# Patient Record
Sex: FEMALE | Race: WHITE | Employment: OTHER | URBAN - METROPOLITAN AREA
[De-identification: names, ages, dates, MRNs, and addresses within clinical notes are randomized per-mention and may not be internally consistent; named-entity substitution may affect disease eponyms.]

---

## 2017-05-24 ENCOUNTER — HOSPITAL ENCOUNTER (OUTPATIENT)
Dept: MAMMOGRAPHY | Age: 72
Discharge: HOME | End: 2017-05-24
Attending: OBSTETRICS & GYNECOLOGY
Payer: MEDICARE

## 2017-05-24 DIAGNOSIS — Z12.39 SCREENING FOR BREAST CANCER: ICD-10-CM

## 2017-05-24 PROCEDURE — 77067 SCR MAMMO BI INCL CAD: CPT

## 2018-05-29 ENCOUNTER — HOSPITAL ENCOUNTER (OUTPATIENT)
Dept: MAMMOGRAPHY | Age: 73
Discharge: HOME | End: 2018-05-29
Attending: OBSTETRICS & GYNECOLOGY
Payer: MEDICARE

## 2018-05-29 DIAGNOSIS — Z12.31 VISIT FOR SCREENING MAMMOGRAM: ICD-10-CM

## 2018-05-29 PROCEDURE — 77063 BREAST TOMOSYNTHESIS BI: CPT

## 2018-10-08 ENCOUNTER — HOSPITAL ENCOUNTER (OUTPATIENT)
Dept: GENERAL RADIOLOGY | Age: 73
Discharge: HOME | End: 2018-10-08
Payer: MEDICARE

## 2018-10-08 DIAGNOSIS — R04.2 HEMOPTYSIS: ICD-10-CM

## 2018-10-08 PROCEDURE — 71046 X-RAY EXAM CHEST 2 VIEWS: CPT

## 2019-05-07 ENCOUNTER — HOSPITAL ENCOUNTER (OUTPATIENT)
Dept: LAB | Age: 74
Discharge: HOME | End: 2019-05-07
Payer: MEDICARE

## 2019-05-07 DIAGNOSIS — J47.1 BRONCHIECTASIS WITH (ACUTE) EXACERBATION (HCC): ICD-10-CM

## 2019-05-07 PROCEDURE — 87116 MYCOBACTERIA CULTURE: CPT

## 2019-05-07 PROCEDURE — 87077 CULTURE AEROBIC IDENTIFY: CPT

## 2019-05-07 PROCEDURE — 87070 CULTURE OTHR SPECIMN AEROBIC: CPT

## 2019-05-14 LAB
ANTIMICROBIAL SUSCEPTIBILITY, 080575: ABNORMAL
BACTERIA ISLT: NORMAL
BACTERIA SPEC CULT: ABNORMAL
GRAM STN SPEC: ABNORMAL
RESULT 1, 080571: ABNORMAL
SERVICE CMNT-IMP: ABNORMAL
SPECIMEN SOURCE: NORMAL

## 2019-05-14 NOTE — PROGRESS NOTES
Please let patient know that sputum culture is negative for mycobacterium so far. Final cultures will take 6-8 weeks. However, bacterial cultures were positive for pseudomonas--needs to be on Cipro 500 mg bid x 3 weeks. Thank you.

## 2019-05-14 NOTE — PROGRESS NOTES
Pt was notified that sputum culture is negative for mycobacterium so far. She was told bacterial cultures were positive for pseudomonas. She was instructed to take cipro 500 mg bid x3 weeks. Pt verbalized understanding. Rx was sent to [facility] in [place]. She was also told final sputum culture will take 6-8 weeks.

## 2019-06-18 ENCOUNTER — HOSPITAL ENCOUNTER (OUTPATIENT)
Dept: MAMMOGRAPHY | Age: 74
Discharge: HOME | End: 2019-06-18
Attending: OBSTETRICS & GYNECOLOGY
Payer: MEDICARE

## 2019-06-18 DIAGNOSIS — Z12.31 VISIT FOR SCREENING MAMMOGRAM: ICD-10-CM

## 2019-06-18 PROCEDURE — 77063 BREAST TOMOSYNTHESIS BI: CPT

## 2019-06-24 LAB
ACID FAST STN SPEC: NEGATIVE
MYCOBACTERIUM SPEC QL CULT: NEGATIVE
SPECIMEN PREPARATION: NORMAL
SPECIMEN SOURCE: NORMAL

## 2020-06-22 ENCOUNTER — HOSPITAL ENCOUNTER (OUTPATIENT)
Dept: MAMMOGRAPHY | Age: 75
Discharge: HOME | End: 2020-06-22
Attending: INTERNAL MEDICINE
Payer: MEDICARE

## 2020-06-22 DIAGNOSIS — Z12.31 SCREENING MAMMOGRAM, ENCOUNTER FOR: ICD-10-CM

## 2020-06-22 PROCEDURE — 77063 BREAST TOMOSYNTHESIS BI: CPT

## 2021-03-29 ENCOUNTER — APPOINTMENT (OUTPATIENT)
Dept: GENERAL RADIOLOGY | Age: 76
DRG: 191 | End: 2021-03-29
Attending: EMERGENCY MEDICINE
Payer: MEDICARE

## 2021-03-29 ENCOUNTER — APPOINTMENT (OUTPATIENT)
Dept: CT IMAGING | Age: 76
DRG: 191 | End: 2021-03-29
Attending: EMERGENCY MEDICINE
Payer: MEDICARE

## 2021-03-29 ENCOUNTER — HOSPITAL ENCOUNTER (INPATIENT)
Age: 76
LOS: 3 days | Discharge: HOME | DRG: 191 | End: 2021-04-01
Attending: EMERGENCY MEDICINE | Admitting: HOSPITALIST
Payer: MEDICARE

## 2021-03-29 DIAGNOSIS — R06.00 DYSPNEA, UNSPECIFIED TYPE: ICD-10-CM

## 2021-03-29 DIAGNOSIS — J90 BILATERAL PLEURAL EFFUSION: ICD-10-CM

## 2021-03-29 DIAGNOSIS — R05.9 COUGH: ICD-10-CM

## 2021-03-29 DIAGNOSIS — J47.0 BRONCHIECTASIS WITH ACUTE LOWER RESPIRATORY INFECTION (HCC): ICD-10-CM

## 2021-03-29 DIAGNOSIS — J18.9 COMMUNITY ACQUIRED PNEUMONIA OF LEFT LOWER LOBE OF LUNG: ICD-10-CM

## 2021-03-29 DIAGNOSIS — J91.8 PARAPNEUMONIC EFFUSION: ICD-10-CM

## 2021-03-29 DIAGNOSIS — J90 PLEURAL EFFUSION, BILATERAL: ICD-10-CM

## 2021-03-29 DIAGNOSIS — J18.9 PARAPNEUMONIC EFFUSION: ICD-10-CM

## 2021-03-29 DIAGNOSIS — J47.9 BRONCHIECTASIS WITHOUT COMPLICATION (HCC): Chronic | ICD-10-CM

## 2021-03-29 DIAGNOSIS — D75.839 THROMBOCYTOSIS: ICD-10-CM

## 2021-03-29 DIAGNOSIS — I30.9 ACUTE PERICARDIAL EFFUSION: ICD-10-CM

## 2021-03-29 DIAGNOSIS — J18.9 PNEUMONIA OF RIGHT LOWER LOBE DUE TO INFECTIOUS ORGANISM: Primary | ICD-10-CM

## 2021-03-29 PROBLEM — I48.91 ATRIAL FIBRILLATION (HCC): Status: ACTIVE | Noted: 2021-03-29

## 2021-03-29 LAB
ALBUMIN SERPL-MCNC: 2.6 G/DL (ref 3.2–4.6)
ALBUMIN/GLOB SERPL: 0.5 {RATIO} (ref 1.2–3.5)
ALP SERPL-CCNC: 173 U/L (ref 50–136)
ALT SERPL-CCNC: 22 U/L (ref 12–65)
ANION GAP SERPL CALC-SCNC: 3 MMOL/L (ref 7–16)
AST SERPL-CCNC: 15 U/L (ref 15–37)
ATRIAL RATE: 82 BPM
BASOPHILS # BLD: 0.1 K/UL (ref 0–0.2)
BASOPHILS NFR BLD: 1 % (ref 0–2)
BILIRUB SERPL-MCNC: 0.5 MG/DL (ref 0.2–1.1)
BUN SERPL-MCNC: 10 MG/DL (ref 8–23)
CALCIUM SERPL-MCNC: 8.9 MG/DL (ref 8.3–10.4)
CALCULATED P AXIS, ECG09: 70 DEGREES
CALCULATED R AXIS, ECG10: 63 DEGREES
CALCULATED T AXIS, ECG11: 16 DEGREES
CHLORIDE SERPL-SCNC: 102 MMOL/L (ref 98–107)
CO2 SERPL-SCNC: 30 MMOL/L (ref 21–32)
CREAT SERPL-MCNC: 0.79 MG/DL (ref 0.6–1)
CRP SERPL HS-MCNC: 241 MG/L
D DIMER PPP FEU-MCNC: 8.88 UG/ML(FEU)
DIAGNOSIS, 93000: NORMAL
DIFFERENTIAL METHOD BLD: ABNORMAL
EOSINOPHIL # BLD: 0.1 K/UL (ref 0–0.8)
EOSINOPHIL NFR BLD: 1 % (ref 0.5–7.8)
ERYTHROCYTE [DISTWIDTH] IN BLOOD BY AUTOMATED COUNT: 13.9 % (ref 11.9–14.6)
ERYTHROCYTE [SEDIMENTATION RATE] IN BLOOD: 70 MM/HR (ref 0–30)
GLOBULIN SER CALC-MCNC: 5.2 G/DL (ref 2.3–3.5)
GLUCOSE SERPL-MCNC: 101 MG/DL (ref 65–100)
HCT VFR BLD AUTO: 38.4 % (ref 35.8–46.3)
HGB BLD-MCNC: 12.1 G/DL (ref 11.7–15.4)
IMM GRANULOCYTES # BLD AUTO: 0.2 K/UL (ref 0–0.5)
IMM GRANULOCYTES NFR BLD AUTO: 1 % (ref 0–5)
LACTATE SERPL-SCNC: 1.5 MMOL/L (ref 0.4–2)
LACTATE SERPL-SCNC: 1.9 MMOL/L (ref 0.4–2)
LYMPHOCYTES # BLD: 2.1 K/UL (ref 0.5–4.6)
LYMPHOCYTES NFR BLD: 11 % (ref 13–44)
MCH RBC QN AUTO: 29.3 PG (ref 26.1–32.9)
MCHC RBC AUTO-ENTMCNC: 31.5 G/DL (ref 31.4–35)
MCV RBC AUTO: 93 FL (ref 79.6–97.8)
MONOCYTES # BLD: 1.7 K/UL (ref 0.1–1.3)
MONOCYTES NFR BLD: 9 % (ref 4–12)
NEUTS SEG # BLD: 15.6 K/UL (ref 1.7–8.2)
NEUTS SEG NFR BLD: 79 % (ref 43–78)
NRBC # BLD: 0 K/UL (ref 0–0.2)
P-R INTERVAL, ECG05: 162 MS
PLATELET # BLD AUTO: 606 K/UL (ref 150–450)
PMV BLD AUTO: 9.5 FL (ref 9.4–12.3)
POTASSIUM SERPL-SCNC: 4.1 MMOL/L (ref 3.5–5.1)
PROCALCITONIN SERPL-MCNC: 0.08 NG/ML
PROT SERPL-MCNC: 7.8 G/DL (ref 6.3–8.2)
Q-T INTERVAL, ECG07: 346 MS
QRS DURATION, ECG06: 68 MS
QTC CALCULATION (BEZET), ECG08: 404 MS
RBC # BLD AUTO: 4.13 M/UL (ref 4.05–5.2)
SODIUM SERPL-SCNC: 135 MMOL/L (ref 136–145)
TROPONIN-HIGH SENSITIVITY: 6 PG/ML (ref 0–14)
VENTRICULAR RATE, ECG03: 82 BPM
WBC # BLD AUTO: 19.8 K/UL (ref 4.3–11.1)

## 2021-03-29 PROCEDURE — 74011250637 HC RX REV CODE- 250/637: Performed by: HOSPITALIST

## 2021-03-29 PROCEDURE — 77030027138 HC INCENT SPIROMETER -A

## 2021-03-29 PROCEDURE — 84145 PROCALCITONIN (PCT): CPT

## 2021-03-29 PROCEDURE — 93005 ELECTROCARDIOGRAM TRACING: CPT | Performed by: EMERGENCY MEDICINE

## 2021-03-29 PROCEDURE — 93306 TTE W/DOPPLER COMPLETE: CPT

## 2021-03-29 PROCEDURE — 94760 N-INVAS EAR/PLS OXIMETRY 1: CPT

## 2021-03-29 PROCEDURE — 85379 FIBRIN DEGRADATION QUANT: CPT

## 2021-03-29 PROCEDURE — 87040 BLOOD CULTURE FOR BACTERIA: CPT

## 2021-03-29 PROCEDURE — 86141 C-REACTIVE PROTEIN HS: CPT

## 2021-03-29 PROCEDURE — 99223 1ST HOSP IP/OBS HIGH 75: CPT | Performed by: INTERNAL MEDICINE

## 2021-03-29 PROCEDURE — 77030040361 HC SLV COMPR DVT MDII -B

## 2021-03-29 PROCEDURE — 96365 THER/PROPH/DIAG IV INF INIT: CPT

## 2021-03-29 PROCEDURE — 80053 COMPREHEN METABOLIC PANEL: CPT

## 2021-03-29 PROCEDURE — 74011000250 HC RX REV CODE- 250: Performed by: HOSPITALIST

## 2021-03-29 PROCEDURE — 96375 TX/PRO/DX INJ NEW DRUG ADDON: CPT

## 2021-03-29 PROCEDURE — 71260 CT THORAX DX C+: CPT

## 2021-03-29 PROCEDURE — 74011636637 HC RX REV CODE- 636/637: Performed by: HOSPITALIST

## 2021-03-29 PROCEDURE — 74011000636 HC RX REV CODE- 636: Performed by: EMERGENCY MEDICINE

## 2021-03-29 PROCEDURE — 99285 EMERGENCY DEPT VISIT HI MDM: CPT

## 2021-03-29 PROCEDURE — 65270000029 HC RM PRIVATE

## 2021-03-29 PROCEDURE — 94640 AIRWAY INHALATION TREATMENT: CPT

## 2021-03-29 PROCEDURE — 85652 RBC SED RATE AUTOMATED: CPT

## 2021-03-29 PROCEDURE — 74011000250 HC RX REV CODE- 250: Performed by: EMERGENCY MEDICINE

## 2021-03-29 PROCEDURE — 71046 X-RAY EXAM CHEST 2 VIEWS: CPT

## 2021-03-29 PROCEDURE — 74011250636 HC RX REV CODE- 250/636: Performed by: EMERGENCY MEDICINE

## 2021-03-29 PROCEDURE — 74011000258 HC RX REV CODE- 258: Performed by: EMERGENCY MEDICINE

## 2021-03-29 PROCEDURE — 74011250636 HC RX REV CODE- 250/636: Performed by: INTERNAL MEDICINE

## 2021-03-29 PROCEDURE — 87205 SMEAR GRAM STAIN: CPT

## 2021-03-29 PROCEDURE — 2709999900 HC NON-CHARGEABLE SUPPLY

## 2021-03-29 PROCEDURE — 74011250636 HC RX REV CODE- 250/636: Performed by: HOSPITALIST

## 2021-03-29 PROCEDURE — 85025 COMPLETE CBC W/AUTO DIFF WBC: CPT

## 2021-03-29 PROCEDURE — 84484 ASSAY OF TROPONIN QUANT: CPT

## 2021-03-29 PROCEDURE — 83605 ASSAY OF LACTIC ACID: CPT

## 2021-03-29 RX ORDER — FUROSEMIDE 10 MG/ML
20 INJECTION INTRAMUSCULAR; INTRAVENOUS 2 TIMES DAILY
Status: COMPLETED | OUTPATIENT
Start: 2021-03-29 | End: 2021-03-30

## 2021-03-29 RX ORDER — VANCOMYCIN/0.9 % SOD CHLORIDE 1.5G/250ML
1500 PLASTIC BAG, INJECTION (ML) INTRAVENOUS ONCE
Status: DISCONTINUED | OUTPATIENT
Start: 2021-03-29 | End: 2021-03-29 | Stop reason: SDUPTHER

## 2021-03-29 RX ORDER — ACETAMINOPHEN 650 MG/1
650 SUPPOSITORY RECTAL
Status: DISCONTINUED | OUTPATIENT
Start: 2021-03-29 | End: 2021-04-01 | Stop reason: HOSPADM

## 2021-03-29 RX ORDER — METOPROLOL SUCCINATE 50 MG/1
50 TABLET, EXTENDED RELEASE ORAL DAILY
Status: DISCONTINUED | OUTPATIENT
Start: 2021-03-30 | End: 2021-04-01 | Stop reason: HOSPADM

## 2021-03-29 RX ORDER — POTASSIUM CHLORIDE 7.45 MG/ML
10 INJECTION INTRAVENOUS AS NEEDED
Status: DISCONTINUED | OUTPATIENT
Start: 2021-03-29 | End: 2021-04-01 | Stop reason: HOSPADM

## 2021-03-29 RX ORDER — SODIUM CHLORIDE 0.9 % (FLUSH) 0.9 %
5-40 SYRINGE (ML) INJECTION EVERY 8 HOURS
Status: DISCONTINUED | OUTPATIENT
Start: 2021-03-29 | End: 2021-04-01 | Stop reason: HOSPADM

## 2021-03-29 RX ORDER — ACETAMINOPHEN 325 MG/1
650 TABLET ORAL
Status: DISCONTINUED | OUTPATIENT
Start: 2021-03-29 | End: 2021-04-01 | Stop reason: HOSPADM

## 2021-03-29 RX ORDER — BUDESONIDE 0.5 MG/2ML
500 INHALANT ORAL
Status: DISCONTINUED | OUTPATIENT
Start: 2021-03-29 | End: 2021-04-01 | Stop reason: HOSPADM

## 2021-03-29 RX ORDER — LEVOTHYROXINE SODIUM 75 UG/1
75 TABLET ORAL DAILY
Status: DISCONTINUED | OUTPATIENT
Start: 2021-03-30 | End: 2021-04-01 | Stop reason: HOSPADM

## 2021-03-29 RX ORDER — PREDNISONE 20 MG/1
60 TABLET ORAL
Status: DISCONTINUED | OUTPATIENT
Start: 2021-03-29 | End: 2021-04-01 | Stop reason: HOSPADM

## 2021-03-29 RX ORDER — DEXAMETHASONE SODIUM PHOSPHATE 100 MG/10ML
8 INJECTION INTRAMUSCULAR; INTRAVENOUS
Status: COMPLETED | OUTPATIENT
Start: 2021-03-29 | End: 2021-03-29

## 2021-03-29 RX ORDER — ONDANSETRON 2 MG/ML
4 INJECTION INTRAMUSCULAR; INTRAVENOUS
Status: DISCONTINUED | OUTPATIENT
Start: 2021-03-29 | End: 2021-04-01 | Stop reason: HOSPADM

## 2021-03-29 RX ORDER — SODIUM CHLORIDE 0.9 % (FLUSH) 0.9 %
10 SYRINGE (ML) INJECTION
Status: COMPLETED | OUTPATIENT
Start: 2021-03-29 | End: 2021-03-29

## 2021-03-29 RX ORDER — LEVALBUTEROL INHALATION SOLUTION 1.25 MG/3ML
1.25 SOLUTION RESPIRATORY (INHALATION)
Status: DISCONTINUED | OUTPATIENT
Start: 2021-03-29 | End: 2021-04-01 | Stop reason: HOSPADM

## 2021-03-29 RX ORDER — SODIUM CHLORIDE 0.9 % (FLUSH) 0.9 %
5-40 SYRINGE (ML) INJECTION AS NEEDED
Status: DISCONTINUED | OUTPATIENT
Start: 2021-03-29 | End: 2021-04-01 | Stop reason: HOSPADM

## 2021-03-29 RX ORDER — PROMETHAZINE HYDROCHLORIDE 25 MG/1
12.5 TABLET ORAL
Status: DISCONTINUED | OUTPATIENT
Start: 2021-03-29 | End: 2021-04-01 | Stop reason: HOSPADM

## 2021-03-29 RX ORDER — ENOXAPARIN SODIUM 100 MG/ML
40 INJECTION SUBCUTANEOUS DAILY
Status: DISCONTINUED | OUTPATIENT
Start: 2021-03-30 | End: 2021-03-29

## 2021-03-29 RX ORDER — MAGNESIUM SULFATE HEPTAHYDRATE 40 MG/ML
2 INJECTION, SOLUTION INTRAVENOUS AS NEEDED
Status: DISCONTINUED | OUTPATIENT
Start: 2021-03-29 | End: 2021-04-01 | Stop reason: HOSPADM

## 2021-03-29 RX ORDER — IPRATROPIUM BROMIDE AND ALBUTEROL SULFATE 2.5; .5 MG/3ML; MG/3ML
3 SOLUTION RESPIRATORY (INHALATION)
Status: COMPLETED | OUTPATIENT
Start: 2021-03-29 | End: 2021-03-29

## 2021-03-29 RX ORDER — VANCOMYCIN/0.9 % SOD CHLORIDE 1.5G/250ML
1500 PLASTIC BAG, INJECTION (ML) INTRAVENOUS ONCE
Status: COMPLETED | OUTPATIENT
Start: 2021-03-29 | End: 2021-03-29

## 2021-03-29 RX ORDER — PANTOPRAZOLE SODIUM 40 MG/1
40 TABLET, DELAYED RELEASE ORAL
COMMUNITY

## 2021-03-29 RX ORDER — POLYETHYLENE GLYCOL 3350 17 G/17G
17 POWDER, FOR SOLUTION ORAL DAILY PRN
Status: DISCONTINUED | OUTPATIENT
Start: 2021-03-29 | End: 2021-04-01 | Stop reason: HOSPADM

## 2021-03-29 RX ORDER — GUAIFENESIN/DEXTROMETHORPHAN 100-10MG/5
5 SYRUP ORAL EVERY 8 HOURS
Status: DISCONTINUED | OUTPATIENT
Start: 2021-03-29 | End: 2021-03-31

## 2021-03-29 RX ORDER — VENLAFAXINE 75 MG/1
75 TABLET ORAL DAILY
Status: DISCONTINUED | OUTPATIENT
Start: 2021-03-30 | End: 2021-04-01 | Stop reason: HOSPADM

## 2021-03-29 RX ORDER — GUAIFENESIN 600 MG/1
1200 TABLET, EXTENDED RELEASE ORAL DAILY
Status: DISCONTINUED | OUTPATIENT
Start: 2021-03-29 | End: 2021-04-01 | Stop reason: HOSPADM

## 2021-03-29 RX ADMIN — DEXAMETHASONE SODIUM PHOSPHATE 8 MG: 10 INJECTION INTRAMUSCULAR; INTRAVENOUS at 09:58

## 2021-03-29 RX ADMIN — CEFEPIME HYDROCHLORIDE 2 G: 2 INJECTION, POWDER, FOR SOLUTION INTRAVENOUS at 13:46

## 2021-03-29 RX ADMIN — VANCOMYCIN HYDROCHLORIDE 1500 MG: 10 INJECTION, POWDER, LYOPHILIZED, FOR SOLUTION INTRAVENOUS at 16:32

## 2021-03-29 RX ADMIN — Medication 10 ML: at 23:03

## 2021-03-29 RX ADMIN — LEVALBUTEROL HYDROCHLORIDE 1.25 MG: 1.25 SOLUTION RESPIRATORY (INHALATION) at 15:40

## 2021-03-29 RX ADMIN — BUDESONIDE 500 MCG: 0.5 INHALANT RESPIRATORY (INHALATION) at 20:16

## 2021-03-29 RX ADMIN — FUROSEMIDE 20 MG: 10 INJECTION, SOLUTION INTRAMUSCULAR; INTRAVENOUS at 22:50

## 2021-03-29 RX ADMIN — LEVALBUTEROL HYDROCHLORIDE 1.25 MG: 1.25 SOLUTION RESPIRATORY (INHALATION) at 20:16

## 2021-03-29 RX ADMIN — IPRATROPIUM BROMIDE AND ALBUTEROL SULFATE 3 ML: .5; 3 SOLUTION RESPIRATORY (INHALATION) at 10:27

## 2021-03-29 RX ADMIN — SODIUM CHLORIDE 100 ML: 900 INJECTION, SOLUTION INTRAVENOUS at 11:27

## 2021-03-29 RX ADMIN — AZITHROMYCIN MONOHYDRATE 500 MG: 500 INJECTION, POWDER, LYOPHILIZED, FOR SOLUTION INTRAVENOUS at 12:10

## 2021-03-29 RX ADMIN — GUAIFENESIN AND DEXTROMETHORPHAN 5 ML: 100; 10 SYRUP ORAL at 22:51

## 2021-03-29 RX ADMIN — IOPAMIDOL 100 ML: 755 INJECTION, SOLUTION INTRAVENOUS at 11:27

## 2021-03-29 RX ADMIN — GUAIFENESIN 1200 MG: 600 TABLET ORAL at 16:05

## 2021-03-29 RX ADMIN — GUAIFENESIN AND DEXTROMETHORPHAN 5 ML: 100; 10 SYRUP ORAL at 16:06

## 2021-03-29 RX ADMIN — CEFTRIAXONE 2 G: 2 INJECTION, POWDER, FOR SOLUTION INTRAMUSCULAR; INTRAVENOUS at 12:09

## 2021-03-29 RX ADMIN — Medication 10 ML: at 11:27

## 2021-03-29 RX ADMIN — PREDNISONE 60 MG: 20 TABLET ORAL at 16:06

## 2021-03-29 NOTE — PROGRESS NOTES
Pharmacokinetic Consult to Pharmacist    Marcia Emilia is a 70 y.o. female being treated for parapneumonic effusion with vancomycin. Height: 5' 3\" (160 cm)  Weight: 59.9 kg (132 lb)  Lab Results   Component Value Date/Time    BUN 10 03/29/2021 08:19 AM    Creatinine 0.79 03/29/2021 08:19 AM    WBC 19.8 (H) 03/29/2021 08:19 AM    Procalcitonin 0.08 03/29/2021 08:19 AM    Lactic acid 1.9 03/29/2021 12:08 PM      Estimated Creatinine Clearance: 52.5 mL/min (based on SCr of 0.79 mg/dL). CULTURES:  pending    Day 1 of vancomycin. Goal trough is 15-20. Vancomycin dose initiated at 1500 mg X 1, then 1000 mg Q18H. Will continue to follow patient and order levels when clinically indicated.     Thank you,  Aylin Lambert, PharmD, [address]  Clinical Pharmacist  [phone]

## 2021-03-29 NOTE — ED TRIAGE NOTES
Patient ambulatory to triage with mask in place. Patient reports unproductive cough for several weeks. Pt reports shob and pain with inspiration that started in the last few days. Denies fever, sore throat, loss of taste or smell. Pt reports she had a stomach bug Friday and Saturday. Pt reports she tested for COVID a week ago.

## 2021-03-29 NOTE — ED PROVIDER NOTES
Presents with complaint of chest discomfort and dry cough for several months. Patient was diagnosed with A. fib and got a pacemaker. She is on Eliquis. She saw [facility] last week. She was given 3 days of prednisone. She felt better on the prednisone but feels worse now that it is off. She also got 7 days of doxycycline. Patient has a history of bronchiectasis. Reports she had nausea vomiting diarrhea \"stomach bug\" over the weekend. She has not seen pulmonary for this problem. Covid test neg recently. The history is provided by the patient. Cough  This is a new problem. The current episode started more than 1 week ago. The problem has been gradually worsening. The cough is non-productive. There has been no fever. Associated symptoms include chest pain and shortness of breath. Pertinent negatives include no chills. Her past medical history is significant for bronchiectasis. Past Medical History:   Diagnosis Date    Abnormal chest CT February, 2015    The patient had a CT scan that showed infiltrative as well as tree-in-bud changes felt to probably represent atypical mycobacteria infection. However, bronchoscopy with BAL was negative. 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  Graves disease 2010    Thyroidectomy    Ineffective airway clearance     Bronchoscopy: 10/3/11, moderate, thick purulent mucus in the right main bronchus, preliminary positive for Aspergillus flavus.     LLL pneumonia 2010    treated as outpatient    Menopause        Past Surgical History:   Procedure Laterality Date    HX CATARACT REMOVAL Bilateral     HX CHOLECYSTECTOMY  2012    HX HYSTERECTOMY      HX ORTHOPAEDIC      right foot surgery    HX OTHER SURGICAL  February, 2016    Bronchoscopy with BAL    HX SALPINGO-OOPHORECTOMY Bilateral     HX THYROIDECTOMY  2010         Family History:   Problem Relation Age of Onset    Asthma Sister     Breast Cancer Sister 79    Cancer Brother         Prostate    Cancer Brother         Prostate    Deep Vein Thrombosis Father         Secondary to a motor vehicle accident.    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   Social Needs    Financial resource strain: Not on file    Food insecurity     Worry: Not on file     Inability: Not on file   [employer] needs     Medical: Not on file     Non-medical: Not on file   Tobacco Use    Smoking status: Never Smoker    Smokeless tobacco: Never Used   Substance and Sexual Activity    Alcohol use: No     Alcohol/week: 0.0 standard drinks    Drug use: No    Sexual activity: Yes     Partners: Male     Birth control/protection: Surgical     Comment: [facility]   Lifestyle    Physical activity     Days per week: Not on file     Minutes per session: Not on file    Stress: Not on file   Relationships    Social connections     Talks on phone: Not on file     Gets together: Not on file     Attends [employer] service: Not on file     Active member of club or organization: Not on file     Attends meetings of clubs or organizations: Not on file     Relationship status: Not on file    Intimate partner violence     Fear of current or ex partner: Not on file     Emotionally abused: Not on file Physically abused: Not on file     Forced sexual activity: Not on file   Other Topics Concern    Not on file   Social History Narrative    She has lived in second-hand smoke most of her life. ALLERGIES: Duloxetine, Augmentin [amoxicillin-pot clavulanate], Sulfa (sulfonamide antibiotics), and Sulfur    Review of Systems   Constitutional: Negative for chills and fever. Respiratory: Positive for cough and shortness of breath. Cardiovascular: Positive for chest pain. Skin: Negative for rash and wound. All other systems reviewed and are negative. Vitals:    03/29/21 0815 03/29/21 0853   BP: 134/76 135/62   Pulse: 98 88   Resp: 16 18   Temp: 99.1 °F (37.3 °C)    SpO2: 93% 96%   Weight: 59.9 kg (132 lb)    Height: 5' 3\" (1.6 m)             Physical Exam  Vitals signs and nursing note reviewed. Constitutional:       Appearance: Normal appearance. She is ill-appearing. HENT:      Head: Normocephalic and atraumatic. Eyes:      Extraocular Movements: Extraocular movements intact. Conjunctiva/sclera: Conjunctivae normal.   Neck:      Musculoskeletal: Normal range of motion and neck supple. Cardiovascular:      Rate and Rhythm: Normal rate. Rhythm irregular. Pulmonary:      Breath sounds: Wheezing and rhonchi present. Abdominal:      Palpations: Abdomen is soft. Tenderness: There is no abdominal tenderness. There is no guarding. Musculoskeletal:         General: No swelling. Right lower leg: No edema. Left lower leg: No edema. Skin:     General: Skin is warm and dry. Neurological:      General: No focal deficit present. Mental Status: She is alert and oriented to person, place, and time.    Psychiatric:         Mood and Affect: Mood normal.         Behavior: Behavior normal.          MDM  Number of Diagnoses or Management Options  Acute pericardial effusion  Bilateral pleural effusion  Bronchiectasis with acute lower respiratory infection (HCC)  Pneumonia of right lower lobe due to infectious organism  Diagnosis management comments: Patient with new left lower lobe pneumonia compared to chest x-ray read on 3/16 at [facility]. She was on doxycycline last week and has failed outpatient treatment. Her markers for sepsis are negative. She is not hypoxic. D-dimer will check for PE. She was given Decadron IM. Amie Sheikh She will need a pulmonary consult likely. Patient with an echo echo that was normal.  She had a stress test in March that was also normal.  Today CT shows bilateral pleural effusions with pericardial effusion. Since echo she has had A. fib. It is unclear to me whether or not her effusions are due to a cardiovascular problem versus an autoimmune problem. If he had an autoimmune problem that would explain why she felt somewhat better on Decadron. I spoke with Dr. Lucas Hooker from pulmonary who stated given her history of Pseudomonas she needs antibiotic coverage for that and she could be admitted for echo and work-up of these new effusions. Discussed with hospitalist.  Her sats are normal and I discussed the plan with her and her .        Amount and/or Complexity of Data Reviewed  Clinical lab tests: ordered and reviewed  Tests in the radiology section of CPT®: ordered and reviewed  Review and summarize past medical records: yes  Discuss the patient with other providers: yes  Independent visualization of images, tracings, or specimens: yes    Risk of Complications, Morbidity, and/or Mortality  Presenting problems: high  Diagnostic procedures: minimal  Management options: high    Patient Progress  Patient progress: improved         EKG    Date/Time: 3/29/2021 10:48 AM  Performed by: Bonnie Irwin MD  Authorized by: Bonnie Irwin MD     ECG reviewed by ED Physician in the absence of a cardiologist: yes    Previous ECG:     Previous ECG:  Unavailable  Interpretation:     Interpretation: non-specific    Rate:     ECG rate assessment: normal    Rhythm: Rhythm comment:  Sinus arrhythmia  QRS:     QRS axis:  Normal  ST segments:     ST segments:  Normal  Comments:      Pulmonary pattern

## 2021-03-29 NOTE — H&P
HOSPITALIST H&P/CONSULT  NAME:  Padma Rg   Age:  70 y.o.  :   1945   MRN:   [id number]  PCP: Ross Carrasco MD  Consulting MD:  Treatment Team: Attending Provider: Briseida Esquivel MD; Primary Nurse: Lizzy Sanford RN  HPI:   Patient is a 61-year-old  female with history of bronchiectasis (following with [facility]), Aspergillus fungus infection, acquired hypothyroidism status post thyroidectomy from Graves' disease, A. fib on Eliquis who presented to the ER with worsening cough and dyspnea on exertion for past 2 to 4 days. Patient reported being diagnosed with pneumonia about a week ago and was seen in urgent care, where she was given a week of doxycycline and recently took prednisone for 3 days. Patient felt better temporarily while being on prednisone but symptoms came back again since she stopped it. She also had a recent viral gastroenteritis which was mostly associated with nausea. She denied having any fever, chills, vomiting, abdominal pain. She reports having dry cough, reports chest discomfort on coughing and deep breathing. She was tested negative for Covid about a week ago. CT chest with contrast showed bilateral pleural effusion and bibasilar, left greater than right, moderate size. Effusion with no evidence of pulmonary embolism. Blood work was essentially unremarkable with PCT 0.08, lactic acid 1.5, WBC 19.8. Complete ROS done and is as stated in HPI or otherwise negative  Past Medical History:   Diagnosis Date    Abnormal chest CT     The patient had a CT scan that showed infiltrative as well as tree-in-bud changes felt to probably represent atypical mycobacteria infection. However, bronchoscopy with BAL was negative.  Aspergillus flavus ([facility]     Grew out of BAL and felt to represent colonization    Bronchiectasis without acute exacerbation ([address]     Treated as an outpatient.    [facility] disease 2010    Thyroidectomy    Ineffective airway clearance     Bronchoscopy: 10/3/11, moderate, thick purulent mucus in the right main bronchus, preliminary positive for Aspergillus flavus.  LLL pneumonia     treated as outpatient    Menopause       Past Surgical History:   Procedure Laterality Date    HX CATARACT REMOVAL Bilateral     HX CHOLECYSTECTOMY      HX HYSTERECTOMY      HX ORTHOPAEDIC      right foot surgery    HX OTHER SURGICAL      Bronchoscopy with BAL    HX SALPINGO-OOPHORECTOMY Bilateral     HX THYROIDECTOMY  2010      Prior to Admission Medications   Prescriptions Last Dose Informant Patient Reported? Taking? Calcium Carbonate-Vit D3-Min (CALTRATE 600+D PLUS MINERALS) 600 mg calcium- 400 unit Tab   Yes No   Sig: Take 1 Tab by mouth. NEXIUM 40 mg capsule   Yes No   Sig: Take 40 mg by mouth daily. conjugated estrogens (PREMARIN) 0.625 mg/gram vaginal cream   No No   Sig: Insert 0.5 g into vagina every Monday, Wednesday, Friday. cyanocobalamin (VITAMIN B-12) 500 mcg tablet   Yes No   Sig: Take 500 mcg by mouth daily. fluticasone propion-salmeteroL (Wixela Inhub) 250-50 mcg/dose diskus inhaler   No No   Sig: Take 1 Puff by inhalation two (2) times a day. fluticasone propionate (FLONASE) 50 mcg/actuation nasal spray   No No   Si Sprays by Both Nostrils route daily. guaiFENesin ER (MUCINEX) 600 mg ER tablet   Yes No   Sig: Take 600 mg by mouth daily. levalbuterol (Xopenex) 0.63 mg/3 mL nebu   No No   Sig: 3 mL by Nebulization route two (2) times a day. File to Medicare part B--diagnosis J47.9   levalbuterol tartrate (XOPENEX) 45 mcg/actuation inhaler   No No   Sig: Take 2 Puffs by inhalation every four (4) hours as needed for Wheezing. levothyroxine (SYNTHROID) 75 mcg tablet   Yes No   Sig: Take 75 mcg by mouth daily. magnesium 250 mg tab   Yes No   Sig: Take 1 Tab by mouth daily.    metoprolol-XL (TOPROL XL) 50 mg XL tablet   Yes No   Sig: Take 50 mg by mouth daily. propranolol (INDERAL) 20 mg tablet   Yes No   Sig: Take 20 mg by mouth daily. raloxifene (EVISTA) 60 mg tablet   No No   Sig: Take 1 Tab by mouth daily. venlafaxine (EFFEXOR) 75 mg tablet   Yes No   Sig: Take 75 mg by mouth daily. Facility-Administered Medications: None     Allergies   Allergen Reactions    Duloxetine Unknown (comments) and Other (comments)    Augmentin [Amoxicillin-Pot Clavulanate] Diarrhea    Sulfa (Sulfonamide Antibiotics) Unknown (comments)    Sulfur Hives      Social History     Tobacco Use    Smoking status: Never Smoker    Smokeless tobacco: Never Used   Substance Use Topics    Alcohol use: No     Alcohol/week: 0.0 standard drinks      Family History   Problem Relation Age of Onset    Asthma Sister     Breast Cancer Sister 79    Cancer Brother         Prostate    Cancer Brother         Prostate    Deep Vein Thrombosis Father         Secondary to a motor vehicle accident.  Other Sister         The patient has a twin sister and an older sister both of whom have bronchiectasis with atypical mycobacteria infection    Breast Cancer Maternal Aunt [de-identified]    Breast Cancer Paternal Aunt [de-identified]    Colon Cancer Neg Hx     Ovarian Cancer Neg Hx       Objective:     Visit Vitals  BP (!) 119/58 (BP 1 Location: Left upper arm, BP Patient Position: At rest)   Pulse 72   Temp 99.1 °F (37.3 °C)   Resp 16   Ht 5' 3\" (1.6 m)   Wt 59.9 kg (132 lb)   SpO2 98%   BMI 23.38 kg/m²      Temp (24hrs), Av.1 °F (37.3 °C), Min:99.1 °F (37.3 °C), Max:99.1 °F (37.3 °C)    Oxygen Therapy  O2 Sat (%): 98 % (21 1318)  Pulse via Oximetry: 85 beats per minute (21 1029)  O2 Device: Room air (21 1318)  Physical Exam:  General:    Alert, awake, NAD, on room air     Head:   Normocephalic, without obvious abnormality, atraumatic. Nose:  Nares normal. No drainage or sinus tenderness. Lungs:   Rhonchi bilaterally, left greater than right.   Bibasilar crackles left greater than right. No wheezing  Heart:   Regular rate and rhythm,  no murmur, rub or gallop. Abdomen:   Soft, non-tender. Not distended. Bowel sounds normal.   Extremities: No cyanosis. No edema. No clubbing  Skin:     Texture, turgor normal. No rashes or lesions. Not Jaundiced  Neurologic: GCS 15, no motor or sensory deficits, cranial nerves II 12 are grossly intact  Psych:             AO x3, mood and affect appropriate  Data Review:   Recent Results (from the past 24 hour(s))   D DIMER    Collection Time: 03/29/21  8:18 AM   Result Value Ref Range    D DIMER 8.88 (H) <0.56 ug/ml(FEU)   CBC WITH AUTOMATED DIFF    Collection Time: 03/29/21  8:19 AM   Result Value Ref Range    WBC 19.8 (H) 4.3 - 11.1 K/uL    RBC 4.13 4.05 - 5.2 M/uL    HGB 12.1 11.7 - 15.4 g/dL    HCT 38.4 35.8 - 46.3 %    MCV 93.0 79.6 - 97.8 FL    MCH 29.3 26.1 - 32.9 PG    MCHC 31.5 31.4 - 35.0 g/dL    RDW 13.9 11.9 - 14.6 %    PLATELET 778 (H) 529 - 450 K/uL    MPV 9.5 9.4 - 12.3 FL    ABSOLUTE NRBC 0.00 0.0 - 0.2 K/uL    DF AUTOMATED      NEUTROPHILS 79 (H) 43 - 78 %    LYMPHOCYTES 11 (L) 13 - 44 %    MONOCYTES 9 4.0 - 12.0 %    EOSINOPHILS 1 0.5 - 7.8 %    BASOPHILS 1 0.0 - 2.0 %    IMMATURE GRANULOCYTES 1 0.0 - 5.0 %    ABS. NEUTROPHILS 15.6 (H) 1.7 - 8.2 K/UL    ABS. LYMPHOCYTES 2.1 0.5 - 4.6 K/UL    ABS. MONOCYTES 1.7 (H) 0.1 - 1.3 K/UL    ABS. EOSINOPHILS 0.1 0.0 - 0.8 K/UL    ABS. BASOPHILS 0.1 0.0 - 0.2 K/UL    ABS. IMM.  GRANS. 0.2 0.0 - 0.5 K/UL   METABOLIC PANEL, COMPREHENSIVE    Collection Time: 03/29/21  8:19 AM   Result Value Ref Range    Sodium 135 (L) 136 - 145 mmol/L    Potassium 4.1 3.5 - 5.1 mmol/L    Chloride 102 98 - 107 mmol/L    CO2 30 21 - 32 mmol/L    Anion gap 3 (L) 7 - 16 mmol/L    Glucose 101 (H) 65 - 100 mg/dL    BUN 10 8 - 23 MG/DL    Creatinine 0.79 0.6 - 1.0 MG/DL    GFR est AA >60 >60 ml/min/1.73m2    GFR est non-AA >60 >60 ml/min/1.73m2    Calcium 8.9 8.3 - 10.4 MG/DL    Bilirubin, total 0.5 0.2 - 1.1 MG/DL ALT (SGPT) 22 12 - 65 U/L    AST (SGOT) 15 15 - 37 U/L    Alk. phosphatase 173 (H) 50 - 136 U/L    Protein, total 7.8 6.3 - 8.2 g/dL    Albumin 2.6 (L) 3.2 - 4.6 g/dL    Globulin 5.2 (H) 2.3 - 3.5 g/dL    A-G Ratio 0.5 (L) 1.2 - 3.5     PROCALCITONIN    Collection Time: 03/29/21  8:19 AM   Result Value Ref Range    Procalcitonin 0.08 ng/mL   TROPONIN-HIGH SENSITIVITY    Collection Time: 03/29/21  8:19 AM   Result Value Ref Range    Troponin-High Sensitivity 6.0 0 - 14 pg/mL   LACTIC ACID    Collection Time: 03/29/21  9:20 AM   Result Value Ref Range    Lactic acid 1.5 0.4 - 2.0 MMOL/L   EKG, 12 LEAD, INITIAL    Collection Time: 03/29/21  9:30 AM   Result Value Ref Range    Ventricular Rate 82 BPM    Atrial Rate 82 BPM    P-R Interval 162 ms    QRS Duration 68 ms    Q-T Interval 346 ms    QTC Calculation (Bezet) 404 ms    Calculated P Axis 70 degrees    Calculated R Axis 63 degrees    Calculated T Axis 16 degrees    Diagnosis       !! AGE AND GENDER SPECIFIC ECG ANALYSIS !! Sinus rhythm with Premature atrial complexes  Possible Left atrial enlargement  Nonspecific T wave abnormality  Abnormal ECG  No previous ECGs available     LACTIC ACID    Collection Time: 03/29/21 12:08 PM   Result Value Ref Range    Lactic acid 1.9 0.4 - 2.0 MMOL/L     Imaging Dagmar Yeh /Studies: All diagnostic imaging personally reviewed by me. CT chest:  FINDINGS:  - LUNGS: There are bilateral pleural effusions, left greater than right. Associated atelectasis in both lung bases, also left greater than right.  - HEART/VESSELS: Normal enhancement of pulmonary arteries and aorta. There is a  pericardial effusion, 18 mm in greatest thickness.     - MEDIASTINUM/AXILLA: No significant adenopathy.  - CHEST WALL/BONES: Normal.  - UPPER ABDOMEN: Mild diffuse fatty infiltration of the liver. Several slightly  prominent vessels are noted near the left adrenal gland, likely a vascular  malformation or varices.     IMPRESSION  1.   Bilateral pleural effusions and bibasilar atelectasis, left greater than  right. 2.  Moderate size pericardial effusion. 3.  No evidence of pulmonary embolus. Chest X-ray     INDICATION: Cough and shortness of breath     PA and lateral views of the chest were obtained.     FINDINGS: There is developing infiltrate and effusion in the left lung base. Mild interstitial prominence is also present in both lung bases. There is mild  cardiomegaly. Pacemaker is present.       IMPRESSION  Left lower lobe infiltrate and effusion, concerning for pneumonia  Assessment and Plan: Active Hospital Problems    Diagnosis Date Noted    Parapneumonic effusion 03/29/2021     Priority: 1 - One    Cough 03/29/2021    Dyspnea 03/29/2021    Community acquired pneumonia 03/29/2021    Atrial fibrillation ([facility] [address] 03/29/2021    Bronchiectasis ([facility] 05/10/2016    Acquired hypothyroidism 05/10/2016    GERD (gastroesophageal reflux disease) 09/12/2013       PLAN  · Admit to inpatient in view of parapneumonic effusion L>>R secondary to bacterial pneumonia with failed outpatient antibiotic course. · Follow-up with sputum and blood culture. · Start on broad-spectrum antibiotic including Cefepime and Vancomycin. Given history of bronchiectasis, Pseudomonas coverage is mandatory. Received 1 dose of Rocephin & azithromycin in ER. · Follow-up with ESR and CRP. Follow-up with mycoplasma and Legionella urine antigen. · Pericardial effusion could be secondary to postinfectious pericarditis. Will follow with 2D echo. We will consult cardiology. · Start on prednisone 60 mg p.o. daily. · PCT and lactic acid within normal limits. · Starting Xopenex neb every 6 HRT along with Pulmicort twice daily. · Mucinex 200 mg p.o. twice daily. Comparison DM every 8 hours for cough. · Pulmonary consulted to evaluate for possible thoracentesis. · Holding Eliquis for possible procedure.   · A. fib: Heart rate is optimally controlled, continue Toprol-XL 50 mg p.o. daily. · Anxiety depression: restarting Effexor 75 mg p.o. daily. · Hypothyroidism: Restarting Synthroid 75 mcg p.o. daily empty stomach  · All other home medications have been reviewed and restarted as reconciled in [facility] · DVT prophylax with SCD.   Eliquis hold for possible thoracentesis  · PT and OT eval    Code Status: Full  Moderate risk  Anticipated discharge: Greater than 2 midnights    Signed By: Chris Juarez MD     March 29, 2021

## 2021-03-29 NOTE — ED NOTES
TRANSFER - OUT REPORT:    Verbal report given to  RN Ailyn Yates (name) on Nadeen Burkett  being transferred to [facility](unit) for routine progression of care       Report consisted of patients Situation, Background, Assessment and   Recommendations(SBAR). Information from the following report(s) SBAR, Kardex, ED Summary, Procedure Summary, Intake/Output and MAR was reviewed with the receiving nurse. Lines:   Peripheral IV 03/29/21 Right Antecubital (Active)       Peripheral IV 03/29/21 Left Forearm (Active)        Opportunity for questions and clarification was provided.       Patient transported with:   [employer]

## 2021-03-30 PROBLEM — D75.839 THROMBOCYTOSIS: Status: ACTIVE | Noted: 2021-03-30

## 2021-03-30 LAB
ANION GAP SERPL CALC-SCNC: 8 MMOL/L (ref 7–16)
APPEARANCE FLD: NORMAL
B PERT DNA SPEC QL NAA+PROBE: NOT DETECTED
BNP SERPL-MCNC: 590 PG/ML (ref 5–125)
BORDETELLA PARAPERTUSSIS PCR, BORPAR: NOT DETECTED
BUN SERPL-MCNC: 9 MG/DL (ref 8–23)
C PNEUM DNA SPEC QL NAA+PROBE: NOT DETECTED
CALCIUM SERPL-MCNC: 8.5 MG/DL (ref 8.3–10.4)
CHLORIDE SERPL-SCNC: 105 MMOL/L (ref 98–107)
CO2 SERPL-SCNC: 25 MMOL/L (ref 21–32)
COLOR FLD: NORMAL
CREAT SERPL-MCNC: 0.78 MG/DL (ref 0.6–1)
ERYTHROCYTE [DISTWIDTH] IN BLOOD BY AUTOMATED COUNT: 13.9 % (ref 11.9–14.6)
FLUAV SUBTYP SPEC NAA+PROBE: NOT DETECTED
FLUBV RNA SPEC QL NAA+PROBE: NOT DETECTED
GLUCOSE FLD-MCNC: 142 MG/DL
GLUCOSE SERPL-MCNC: 142 MG/DL (ref 65–100)
HADV DNA SPEC QL NAA+PROBE: NOT DETECTED
HCOV 229E RNA SPEC QL NAA+PROBE: NOT DETECTED
HCOV HKU1 RNA SPEC QL NAA+PROBE: NOT DETECTED
HCOV NL63 RNA SPEC QL NAA+PROBE: NOT DETECTED
HCOV OC43 RNA SPEC QL NAA+PROBE: NOT DETECTED
HCT VFR BLD AUTO: 32.5 % (ref 35.8–46.3)
HGB BLD-MCNC: 10.4 G/DL (ref 11.7–15.4)
HMPV RNA SPEC QL NAA+PROBE: NOT DETECTED
HPIV1 RNA SPEC QL NAA+PROBE: NOT DETECTED
HPIV2 RNA SPEC QL NAA+PROBE: NOT DETECTED
HPIV3 RNA SPEC QL NAA+PROBE: NOT DETECTED
HPIV4 RNA SPEC QL NAA+PROBE: NOT DETECTED
IRON SERPL-MCNC: 14 UG/DL (ref 35–150)
LDH FLD L TO P-CCNC: 197 U/L
LYMPHOCYTES NFR BRONCH MANUAL: 18 %
M PNEUMO DNA SPEC QL NAA+PROBE: NOT DETECTED
MACROPHAGES NFR BRONCH MANUAL: 20 %
MAGNESIUM SERPL-MCNC: 2.4 MG/DL (ref 1.8–2.4)
MCH RBC QN AUTO: 29.1 PG (ref 26.1–32.9)
MCHC RBC AUTO-ENTMCNC: 32 G/DL (ref 31.4–35)
MCV RBC AUTO: 91 FL (ref 79.6–97.8)
NEUTROPHILS NFR BRONCH MANUAL: 62 %
NRBC # BLD: 0 K/UL (ref 0–0.2)
NUC CELL # FLD: 2753 /CU MM
PLATELET # BLD AUTO: 608 K/UL (ref 150–450)
PMV BLD AUTO: 9.3 FL (ref 9.4–12.3)
POTASSIUM SERPL-SCNC: 3.3 MMOL/L (ref 3.5–5.1)
POTASSIUM SERPL-SCNC: 3.7 MMOL/L (ref 3.5–5.1)
PROT FLD-MCNC: 4.2 G/DL
RBC # BLD AUTO: 3.57 M/UL (ref 4.05–5.2)
RBC # FLD: NORMAL /CU MM
RSV RNA SPEC QL NAA+PROBE: NOT DETECTED
RV+EV RNA SPEC QL NAA+PROBE: NOT DETECTED
SARS-COV-2 PCR, COVPCR: NOT DETECTED
SODIUM SERPL-SCNC: 138 MMOL/L (ref 136–145)
SPECIMEN SOURCE FLD: NORMAL
TSH SERPL DL<=0.005 MIU/L-ACNC: 0.33 UIU/ML (ref 0.36–3.74)
WBC # BLD AUTO: 24.1 K/UL (ref 4.3–11.1)

## 2021-03-30 PROCEDURE — 87449 NOS EACH ORGANISM AG IA: CPT

## 2021-03-30 PROCEDURE — 36415 COLL VENOUS BLD VENIPUNCTURE: CPT

## 2021-03-30 PROCEDURE — 84157 ASSAY OF PROTEIN OTHER: CPT

## 2021-03-30 PROCEDURE — 87077 CULTURE AEROBIC IDENTIFY: CPT

## 2021-03-30 PROCEDURE — 89050 BODY FLUID CELL COUNT: CPT

## 2021-03-30 PROCEDURE — 82945 GLUCOSE OTHER FLUID: CPT

## 2021-03-30 PROCEDURE — 94640 AIRWAY INHALATION TREATMENT: CPT

## 2021-03-30 PROCEDURE — 74011636637 HC RX REV CODE- 636/637: Performed by: HOSPITALIST

## 2021-03-30 PROCEDURE — 74011250636 HC RX REV CODE- 250/636: Performed by: INTERNAL MEDICINE

## 2021-03-30 PROCEDURE — 87186 SC STD MICRODIL/AGAR DIL: CPT

## 2021-03-30 PROCEDURE — 83735 ASSAY OF MAGNESIUM: CPT

## 2021-03-30 PROCEDURE — 2709999900 HC NON-CHARGEABLE SUPPLY

## 2021-03-30 PROCEDURE — 32555 ASPIRATE PLEURA W/ IMAGING: CPT | Performed by: INTERNAL MEDICINE

## 2021-03-30 PROCEDURE — 74011000258 HC RX REV CODE- 258: Performed by: HOSPITALIST

## 2021-03-30 PROCEDURE — 88112 CYTOPATH CELL ENHANCE TECH: CPT

## 2021-03-30 PROCEDURE — 97116 GAIT TRAINING THERAPY: CPT

## 2021-03-30 PROCEDURE — 80048 BASIC METABOLIC PNL TOTAL CA: CPT

## 2021-03-30 PROCEDURE — 76040000019: Performed by: INTERNAL MEDICINE

## 2021-03-30 PROCEDURE — 83880 ASSAY OF NATRIURETIC PEPTIDE: CPT

## 2021-03-30 PROCEDURE — 74011250637 HC RX REV CODE- 250/637: Performed by: HOSPITALIST

## 2021-03-30 PROCEDURE — 87070 CULTURE OTHR SPECIMN AEROBIC: CPT

## 2021-03-30 PROCEDURE — 97535 SELF CARE MNGMENT TRAINING: CPT

## 2021-03-30 PROCEDURE — 0W9B3ZZ DRAINAGE OF LEFT PLEURAL CAVITY, PERCUTANEOUS APPROACH: ICD-10-PCS | Performed by: INTERNAL MEDICINE

## 2021-03-30 PROCEDURE — 83615 LACTATE (LD) (LDH) ENZYME: CPT

## 2021-03-30 PROCEDURE — 84443 ASSAY THYROID STIM HORMONE: CPT

## 2021-03-30 PROCEDURE — 87102 FUNGUS ISOLATION CULTURE: CPT

## 2021-03-30 PROCEDURE — 2709999900 HC NON-CHARGEABLE SUPPLY: Performed by: INTERNAL MEDICINE

## 2021-03-30 PROCEDURE — 87116 MYCOBACTERIA CULTURE: CPT

## 2021-03-30 PROCEDURE — 74011250637 HC RX REV CODE- 250/637: Performed by: NURSE PRACTITIONER

## 2021-03-30 PROCEDURE — 85027 COMPLETE CBC AUTOMATED: CPT

## 2021-03-30 PROCEDURE — 97161 PT EVAL LOW COMPLEX 20 MIN: CPT

## 2021-03-30 PROCEDURE — 77030014147 HC TY THORCENT PARA TELE -B: Performed by: INTERNAL MEDICINE

## 2021-03-30 PROCEDURE — 83540 ASSAY OF IRON: CPT

## 2021-03-30 PROCEDURE — 84132 ASSAY OF SERUM POTASSIUM: CPT

## 2021-03-30 PROCEDURE — 0202U NFCT DS 22 TRGT SARS-COV-2: CPT

## 2021-03-30 PROCEDURE — 74011250637 HC RX REV CODE- 250/637: Performed by: INTERNAL MEDICINE

## 2021-03-30 PROCEDURE — 65270000029 HC RM PRIVATE

## 2021-03-30 PROCEDURE — 74011000250 HC RX REV CODE- 250: Performed by: HOSPITALIST

## 2021-03-30 PROCEDURE — 74011250636 HC RX REV CODE- 250/636: Performed by: NURSE PRACTITIONER

## 2021-03-30 PROCEDURE — 87205 SMEAR GRAM STAIN: CPT

## 2021-03-30 PROCEDURE — 99232 SBSQ HOSP IP/OBS MODERATE 35: CPT | Performed by: INTERNAL MEDICINE

## 2021-03-30 PROCEDURE — 94760 N-INVAS EAR/PLS OXIMETRY 1: CPT

## 2021-03-30 PROCEDURE — 97165 OT EVAL LOW COMPLEX 30 MIN: CPT

## 2021-03-30 PROCEDURE — 88305 TISSUE EXAM BY PATHOLOGIST: CPT

## 2021-03-30 PROCEDURE — 74011250636 HC RX REV CODE- 250/636: Performed by: HOSPITALIST

## 2021-03-30 RX ORDER — POTASSIUM CHLORIDE 14.9 MG/ML
20 INJECTION INTRAVENOUS
Status: COMPLETED | OUTPATIENT
Start: 2021-03-30 | End: 2021-03-30

## 2021-03-30 RX ORDER — FAMOTIDINE 20 MG/1
40 TABLET, FILM COATED ORAL DAILY
Status: DISCONTINUED | OUTPATIENT
Start: 2021-03-30 | End: 2021-04-01 | Stop reason: HOSPADM

## 2021-03-30 RX ORDER — POTASSIUM CHLORIDE 20 MEQ/1
20 TABLET, EXTENDED RELEASE ORAL
Status: DISCONTINUED | OUTPATIENT
Start: 2021-03-30 | End: 2021-03-30

## 2021-03-30 RX ORDER — LANOLIN ALCOHOL/MO/W.PET/CERES
1 CREAM (GRAM) TOPICAL 2 TIMES DAILY WITH MEALS
Status: DISCONTINUED | OUTPATIENT
Start: 2021-03-30 | End: 2021-04-01 | Stop reason: HOSPADM

## 2021-03-30 RX ADMIN — POTASSIUM CHLORIDE 20 MEQ: 14.9 INJECTION, SOLUTION INTRAVENOUS at 12:08

## 2021-03-30 RX ADMIN — FERROUS SULFATE TAB 325 MG (65 MG ELEMENTAL FE) 325 MG: 325 (65 FE) TAB at 17:19

## 2021-03-30 RX ADMIN — LEVALBUTEROL HYDROCHLORIDE 1.25 MG: 1.25 SOLUTION RESPIRATORY (INHALATION) at 01:15

## 2021-03-30 RX ADMIN — PREDNISONE 60 MG: 20 TABLET ORAL at 08:26

## 2021-03-30 RX ADMIN — VANCOMYCIN HYDROCHLORIDE 1000 MG: 1 INJECTION, POWDER, LYOPHILIZED, FOR SOLUTION INTRAVENOUS at 10:49

## 2021-03-30 RX ADMIN — GUAIFENESIN 1200 MG: 600 TABLET ORAL at 08:26

## 2021-03-30 RX ADMIN — FAMOTIDINE 40 MG: 20 TABLET, FILM COATED ORAL at 12:08

## 2021-03-30 RX ADMIN — FUROSEMIDE 20 MG: 10 INJECTION, SOLUTION INTRAMUSCULAR; INTRAVENOUS at 17:19

## 2021-03-30 RX ADMIN — Medication 10 ML: at 06:26

## 2021-03-30 RX ADMIN — CEFEPIME HYDROCHLORIDE 2 G: 2 INJECTION, POWDER, FOR SOLUTION INTRAVENOUS at 01:42

## 2021-03-30 RX ADMIN — LEVALBUTEROL HYDROCHLORIDE 1.25 MG: 1.25 SOLUTION RESPIRATORY (INHALATION) at 19:13

## 2021-03-30 RX ADMIN — VENLAFAXINE 75 MG: 75 TABLET ORAL at 08:27

## 2021-03-30 RX ADMIN — FUROSEMIDE 20 MG: 10 INJECTION, SOLUTION INTRAMUSCULAR; INTRAVENOUS at 08:28

## 2021-03-30 RX ADMIN — METOPROLOL SUCCINATE 50 MG: 50 TABLET, EXTENDED RELEASE ORAL at 08:27

## 2021-03-30 RX ADMIN — LEVALBUTEROL HYDROCHLORIDE 1.25 MG: 1.25 SOLUTION RESPIRATORY (INHALATION) at 08:05

## 2021-03-30 RX ADMIN — LEVOTHYROXINE SODIUM 75 MCG: 0.07 TABLET ORAL at 08:26

## 2021-03-30 RX ADMIN — Medication 5 ML: at 21:35

## 2021-03-30 RX ADMIN — Medication 10 ML: at 01:42

## 2021-03-30 RX ADMIN — LEVALBUTEROL HYDROCHLORIDE 1.25 MG: 1.25 SOLUTION RESPIRATORY (INHALATION) at 13:28

## 2021-03-30 RX ADMIN — BUDESONIDE 500 MCG: 0.5 INHALANT RESPIRATORY (INHALATION) at 19:13

## 2021-03-30 RX ADMIN — POTASSIUM CHLORIDE 20 MEQ: 14.9 INJECTION, SOLUTION INTRAVENOUS at 08:29

## 2021-03-30 RX ADMIN — Medication 10 ML: at 15:47

## 2021-03-30 RX ADMIN — CEFEPIME HYDROCHLORIDE 2 G: 2 INJECTION, POWDER, FOR SOLUTION INTRAVENOUS at 12:07

## 2021-03-30 RX ADMIN — BUDESONIDE 500 MCG: 0.5 INHALANT RESPIRATORY (INHALATION) at 08:05

## 2021-03-30 RX ADMIN — GUAIFENESIN AND DEXTROMETHORPHAN 5 ML: 100; 10 SYRUP ORAL at 21:34

## 2021-03-30 RX ADMIN — GUAIFENESIN AND DEXTROMETHORPHAN 5 ML: 100; 10 SYRUP ORAL at 06:26

## 2021-03-30 RX ADMIN — GUAIFENESIN AND DEXTROMETHORPHAN 5 ML: 100; 10 SYRUP ORAL at 15:47

## 2021-03-30 NOTE — PROGRESS NOTES
Pt sat up on side of bed for thoracentesis. Consent obtained. Time out performed. Pts vitals monitored throughout procedure. Bilateral ultrasound done and pic taken of pleural fluid. Only enough fluid to perform thoracentesis on L at this time per md.  ~375 ml slight blood tinged pleural fluid from L. Pt tolerated procedure well with no adverse rxn. Specimens sent to the lab x 3 and labeled appropriately. Site dressed appropriately and report given to pts RN.   L Lung sliding done and ultrasound findings reviewed by MD.

## 2021-03-30 NOTE — PROGRESS NOTES
END OF SHIFT NOTE:    INTAKE/OUTPUT  03/29 0701 - 03/30 0700  In: 46 [P.O.:240; I.V.:150]  Out: 1125 [Urine:1125]  Voiding: YES  Catheter: NO  Drain:              Flatus: Patient does  have flatus present. Stool:  0 occurrences. Characteristics:  Stool Assessment  Stool Color: (have not observed)    Emesis: 0 occurrences. Characteristics:        VITAL SIGNS  Patient Vitals for the past 12 hrs:   Temp Pulse Resp BP SpO2   03/30/21 1913     96 %   03/30/21 1633 98.1 °F (36.7 °C) 92 16 (!) 126/53 95 %   03/30/21 1405  (!) 106 16 (!) 140/60 98 %   03/30/21 1355  (!) 110 17 138/64 99 %   03/30/21 1328     97 %   03/30/21 1211 98.2 °F (36.8 °C) 85 19 (!) 122/55 93 %   03/30/21 0823 98 °F (36.7 °C) (!) 108 20 127/62 94 %   03/30/21 0805     94 %   03/30/21 0755 98.1 °F (36.7 °C) 89 18 125/61 93 %       Pain Assessment  Pain Intensity 1: 0 (03/30/21 1405)  Pain Location 1: Chest(due to cough)     Patient Stated Pain Goal: 0    Ambulating  Yes    Shift report given to oncoming nurse at the bedside.     Colton Chowdary RN

## 2021-03-30 NOTE — H&P
Date of Surgery Update: Terrell Paniagua was seen and examined. History and physical has been reviewed. The patient has been examined.  There have been no significant clinical changes since the completion of the originally dated History and Physical.    Signed By: Gary Arroyo MD     March 30, 2021 2:10 PM

## 2021-03-30 NOTE — PROGRESS NOTES
Hospitalist Progress Note    Subjective:   Daily Progress Note: 3/30/2021 8:44 AM    Patient presented to ER 3/29 with complaints of unproductive cough x a couple of months with subsequent diagnosis of atrial fib, pacemaker was placed, put on eliquis. History of bronchiectasis, pneumonia, aspergillus flavus infection 2011. Seen at [facility] urgent care 1 week ago for worsening cough, SOB and pain with inspiration, and placed on steroids and 7 days of doxycycline. GI symptoms a few days ago resolved. Negative COVID x 1 week. Wheezing with rhonchi on presentation, found with new left lower lobe pneumonia, parapneumonic effusion, left >right. Markers negative for sepsis. Not hypoxic. Given decadron, CT chest below. Cardiology in for consult. Pulmonary also in for consult. 3/30:  Becomes very SOB with ambulation to bathroom, oxygen sat on sitting down 96% on room air. States she is feeling better than on admission. Assessment/Plan:   PARAPNEUMONIC EFFUSION:  LEFT . RIGHT SECONDARY TO BACTERIAL PNEUMONIA WITH FAILED OP DOXYCYCLINE TREATMENT. Pulmonary on board, appreciate input and care    Continue cefepime and vancomycin (Needs pseudomonas coverage), aerosols   Mycoplasma and legionella urine antigen pending    Cardiology consulted by Pulmonary    Continue mucinex, lasix 20 mg bid x 3 doses, robitussin, steroids   Sputum and blood cultures pending    Respiratory viral panel pending    Admission ESR: 70, pBNP:  590 on admission      GERD:  Pepcid     HYPOTHYROIDISM/GRAVES DISEASE:  TSH:  0.330   Continue home synthroid 75 mcg daily    BRONCHIECTASIS:  HAS NOT BEEN ABLE TO USE BA VEST FOR A FEW WEEKS SINCE PACEMAKER PLACEMENT.      DYSPNEA AND COUGH:  Meds as above      NEWLY DIAGNOSED  ATRIAL FIB WITH PACEMAKER:      Cardiology consulted by Pulmonary   Continue toprol XL 50 mg daily    Holding eliquis in case of possible procedure, will reinstat    3/30: HYPOKALEMIA:  3.3 this am    3/30: Replace and recheck, daily BMP    3/30:  Monitor magnesium, today: 2.4     HISTORY OF PERICARDITIS X 25 YEARS AGO:  NOTED       Current Facility-Administered Medications   Medication Dose Route Frequency    potassium chloride 20 mEq in 100 ml IVPB  20 mEq IntraVENous Q2H    guaiFENesin ER (MUCINEX) tablet 1,200 mg  1,200 mg Oral DAILY    venlafaxine (EFFEXOR) tablet 75 mg  75 mg Oral DAILY    metoprolol succinate (TOPROL-XL) XL tablet 50 mg  50 mg Oral DAILY    levothyroxine (SYNTHROID) tablet 75 mcg  75 mcg Oral DAILY    sodium chloride (NS) flush 5-40 mL  5-40 mL IntraVENous Q8H    sodium chloride (NS) flush 5-40 mL  5-40 mL IntraVENous PRN    acetaminophen (TYLENOL) tablet 650 mg  650 mg Oral Q6H PRN    Or    acetaminophen (TYLENOL) suppository 650 mg  650 mg Rectal Q6H PRN    polyethylene glycol (MIRALAX) packet 17 g  17 g Oral DAILY PRN    promethazine (PHENERGAN) tablet 12.5 mg  12.5 mg Oral Q6H PRN    Or    ondansetron (ZOFRAN) injection 4 mg  4 mg IntraVENous Q6H PRN    potassium chloride 10 mEq in 100 ml IVPB  10 mEq IntraVENous PRN    magnesium sulfate 2 g/50 ml IVPB (premix or compounded)  2 g IntraVENous PRN    levalbuterol (XOPENEX) nebulizer soln 1.25 mg/3 mL  1.25 mg Nebulization Q6H RT    budesonide (PULMICORT) 500 mcg/2 ml nebulizer suspension  500 mcg Nebulization BID RT    cefepime (MAXIPIME) 2 g in 0.9% sodium chloride (MBP/ADV) 100 mL MBP  2 g IntraVENous Q12H    guaiFENesin-dextromethorphan (ROBITUSSIN DM) 100-10 mg/5 mL syrup 5 mL  5 mL Oral Q8H    predniSONE (DELTASONE) tablet 60 mg  60 mg Oral DAILY WITH BREAKFAST    vancomycin (VANCOCIN) 1,000 mg in 0.9% sodium chloride 250 mL (VIAL-MATE)  1,000 mg IntraVENous Q18H    furosemide (LASIX) injection 20 mg  20 mg IntraVENous BID      Review of Systems  A comprehensive review of systems was negative except for that written in the HPI.     Objective:     Visit Vitals  /62 (BP 1 Location: Left upper arm, BP Patient Position: At rest) Pulse (!) 108   Temp 98 °F (36.7 °C)   Resp 20   Ht 5' 3\" (1.6 m)   Wt 59.9 kg (132 lb)   SpO2 94% Comment: ra   Breastfeeding No   BMI 23.38 kg/m²      O2 Device: Room air    Temp (24hrs), Av.3 °F (36.8 °C), Min:98 °F (36.7 °C), Max:98.6 °F (37 °C)    1901 -  0700  In: 390 [P.O.:240; I.V.:150]  Out: 1125 [Urine:1125]    General appearance: Frail appearing. SOB with minimal exertion. Oriented and alert, cooperative. Head: Normocephalic, without obvious abnormality, atraumatic  Eyes: conjunctivae/corneas clear. PERRL  Throat: Lips, mucosa, and tongue normal. Teeth and gums normal  Neck: supple, symmetrical, trachea midline, and no JVD  Lungs: Very faint, scattered crackles, no wheezing, otherwise clear to auscultation bilaterally  Heart: Pacemaker, regular rate and rhythm, S1, S2 normal, no murmur, click, rub or gallop  Abdomen: Flat, soft, non-tender. Bowel sounds normal. No masses,  no organomegaly  Extremities: All extremities normal, atraumatic, no cyanosis or edema  Skin: Skin color, texture, turgor normal. No rashes or lesions  Neurologic: Grossly normal    Additional comments: Notes,orders, test results, vitals reviewed    Data Review  Recent Results (from the past 24 hour(s))   LACTIC ACID    Collection Time: 21  9:20 AM   Result Value Ref Range    Lactic acid 1.5 0.4 - 2.0 MMOL/L   EKG, 12 LEAD, INITIAL    Collection Time: 21  9:30 AM   Result Value Ref Range    Ventricular Rate 82 BPM    Atrial Rate 82 BPM    P-R Interval 162 ms    QRS Duration 68 ms    Q-T Interval 346 ms    QTC Calculation (Bezet) 404 ms    Calculated P Axis 70 degrees    Calculated R Axis 63 degrees    Calculated T Axis 16 degrees    Diagnosis       !! AGE AND GENDER SPECIFIC ECG ANALYSIS !!   Sinus rhythm with Premature atrial complexes  Possible Left atrial enlargement  Nonspecific T wave abnormality  Abnormal ECG  No previous ECGs available  Confirmed by ST DELORIS ESPINOSA MD (), ALCIRA MAI ([id number]) on 3/29/2021 3:50:39 PM     LACTIC ACID    Collection Time: 03/29/21 12:08 PM   Result Value Ref Range    Lactic acid 1.9 0.4 - 2.0 MMOL/L   SED RATE, AUTOMATED    Collection Time: 03/29/21  1:47 PM   Result Value Ref Range    Sed rate, automated 70 (H) 0 - 30 mm/hr   METABOLIC PANEL, BASIC    Collection Time: 03/30/21  4:23 AM   Result Value Ref Range    Sodium 138 136 - 145 mmol/L    Potassium 3.3 (L) 3.5 - 5.1 mmol/L    Chloride 105 98 - 107 mmol/L    CO2 25 21 - 32 mmol/L    Anion gap 8 7 - 16 mmol/L    Glucose 142 (H) 65 - 100 mg/dL    BUN 9 8 - 23 MG/DL    Creatinine 0.78 0.6 - 1.0 MG/DL    GFR est AA >60 >60 ml/min/1.73m2    GFR est non-AA >60 >60 ml/min/1.73m2    Calcium 8.5 8.3 - 10.4 MG/DL   MAGNESIUM    Collection Time: 03/30/21  4:23 AM   Result Value Ref Range    Magnesium 2.4 1.8 - 2.4 mg/dL   CBC W/O DIFF    Collection Time: 03/30/21  4:23 AM   Result Value Ref Range    WBC 24.1 (H) 4.3 - 11.1 K/uL    RBC 3.57 (L) 4.05 - 5.2 M/uL    HGB 10.4 (L) 11.7 - 15.4 g/dL    HCT 32.5 (L) 35.8 - 46.3 %    MCV 91.0 79.6 - 97.8 FL    MCH 29.1 26.1 - 32.9 PG    MCHC 32.0 31.4 - 35.0 g/dL    RDW 13.9 11.9 - 14.6 %    PLATELET 174 (H) 415 - 450 K/uL    MPV 9.3 (L) 9.4 - 12.3 FL    ABSOLUTE NRBC 0.00 0.0 - 0.2 K/uL   NT-PRO BNP    Collection Time: 03/30/21  4:23 AM   Result Value Ref Range    NT pro- (H) 5 - 125 PG/ML   TSH 3RD GENERATION    Collection Time: 03/30/21  4:23 AM   Result Value Ref Range    TSH 0.330 (L) 0.358 - 3.740 uIU/mL     3/30: CXR:  Left lower lobe infiltrate and effusion, concerning for pneumonia     3/30:  CT CHEST:   Bilateral pleural effusions and bibasilar atelectasis, left greater than right. Moderate size pericardial effusion. No evidence of pulmonary embolus.       Care Plan discussed with: Patient and Nurse    Signed By: Cyrus Flood NP     March 30, 2021

## 2021-03-30 NOTE — CONSULTS
CONSULT NOTE    Sheryle Dearth    3/29/2021    Date of Admission:  3/29/2021    The patient's chart is reviewed and the patient is discussed with the staff. Subjective:     Patient is a 70 y.o.  female seen and evaluated at the request of Dr. Soha Soto.  Patient is very pleasant 81-year-old  female past medical history of recent pacemaker placement for A. fib and sick sinus syndrome end of January history of bronchiectasis with 2years 2 episodes of flareup a year except last year since Covid had she has not been leaving the house she has been exerting herself with yard work around the house but since she had the pacemaker end of January she was told not to put the BA vest a to prevent hematoma device that she uses for her bronchiectasis  She had been feeling sick for the past week she had nausea vomiting chills cough pleuritic chest pain and she had not been feeling well she was given a course of antibiotics steroids with partial improvement but today she feels that chest pain is still there other than that she feels okay cough and shortness of breath improved cough was dry. He had cough for almost 6 weeks. Patient does not know she has bronchiectasis this is a familial thing getting her sisters had that smoked in the house they used [employer] before. Review of Systems  A comprehensive review of systems was negative. Patient Active Problem List   Diagnosis Code    Allergic rhinitis due to pollen J30.1    GERD (gastroesophageal reflux disease) K21.9    Chronic arthritis M19.90    Osteopenia M85.80    Abnormal chest CT R93.89    Acquired hypothyroidism E03.9    Bronchiectasis (HCC) J47.9    Cough R05    Parapneumonic effusion J18.9, J91.8    Dyspnea R06.00    Community acquired pneumonia J18.9    Atrial fibrillation ([facility] [address] I48.91       Home DME company . Prior to Admission Medications   Prescriptions Last Dose Informant Patient Reported? Taking? Calcium Carbonate-Vit D3-Min (CALTRATE 600+D PLUS MINERALS) 600 mg calcium- 400 unit Tab   Yes No   Sig: Take 1 Tab by mouth. NEXIUM 40 mg capsule   Yes No   Sig: Take 40 mg by mouth daily. conjugated estrogens (PREMARIN) 0.625 mg/gram vaginal cream   No No   Sig: Insert 0.5 g into vagina every Monday, Wednesday, Friday. cyanocobalamin (VITAMIN B-12) 500 mcg tablet   Yes No   Sig: Take 500 mcg by mouth daily. fluticasone propion-salmeteroL (Wixela Inhub) 250-50 mcg/dose diskus inhaler   No No   Sig: Take 1 Puff by inhalation two (2) times a day. fluticasone propionate (FLONASE) 50 mcg/actuation nasal spray   No No   Si Sprays by Both Nostrils route daily. guaiFENesin ER (MUCINEX) 600 mg ER tablet   Yes No   Sig: Take 600 mg by mouth daily. levalbuterol (Xopenex) 0.63 mg/3 mL nebu   No No   Sig: 3 mL by Nebulization route two (2) times a day. File to Medicare part B--diagnosis J47.9   levalbuterol tartrate (XOPENEX) 45 mcg/actuation inhaler   No No   Sig: Take 2 Puffs by inhalation every four (4) hours as needed for Wheezing. levothyroxine (SYNTHROID) 75 mcg tablet   Yes No   Sig: Take 75 mcg by mouth daily. magnesium 250 mg tab   Yes No   Sig: Take 1 Tab by mouth daily. metoprolol-XL (TOPROL XL) 50 mg XL tablet   Yes No   Sig: Take 50 mg by mouth daily. propranolol (INDERAL) 20 mg tablet   Yes No   Sig: Take 20 mg by mouth daily. raloxifene (EVISTA) 60 mg tablet   No No   Sig: Take 1 Tab by mouth daily. venlafaxine (EFFEXOR) 75 mg tablet   Yes No   Sig: Take 75 mg by mouth daily. Facility-Administered Medications: None       Past Medical History:   Diagnosis Date    Abnormal chest CT     The patient had a CT scan that showed infiltrative as well as tree-in-bud changes felt to probably represent atypical mycobacteria infection. However, bronchoscopy with BAL was negative.     Aspergillus flavus ([facility] [address]     Grew out of BAL and felt to represent colonization    Bronchiectasis without acute exacerbation ([facility] [address] January, 2016    Treated as an outpatient.  Graves disease 2010    Thyroidectomy    Ineffective airway clearance     Bronchoscopy: 10/3/11, moderate, thick purulent mucus in the right main bronchus, preliminary positive for Aspergillus flavus.     LLL pneumonia 2010    treated as outpatient    Menopause      Past Surgical History:   Procedure Laterality Date    HX CATARACT REMOVAL Bilateral     HX CHOLECYSTECTOMY  2012    HX HYSTERECTOMY      HX ORTHOPAEDIC      right foot surgery    HX OTHER SURGICAL  February, 2016    Bronchoscopy with BAL    HX SALPINGO-OOPHORECTOMY Bilateral     HX THYROIDECTOMY  2010     Social History     Socioeconomic History    Marital status:      Spouse name: Not on file    Number of children: Not on file    Years of education: Not on file    Highest education level: Not on file   Occupational History    Occupation: Clerical     Employer: RETIRED     Comment: 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   Social Needs    Financial resource strain: Not on file    Food insecurity     Worry: Not on file     Inability: Not on file   [employer] needs     Medical: Not on file     Non-medical: Not on file   Tobacco Use    Smoking status: Never Smoker    Smokeless tobacco: Never Used   Substance and Sexual Activity    Alcohol use: No     Alcohol/week: 0.0 standard drinks    Drug use: No    Sexual activity: Yes     Partners: Male     Birth control/protection: Surgical     Comment: hyst   Lifestyle    Physical activity     Days per week: Not on file     Minutes per session: Not on file    Stress: Not on file   Relationships    Social connections     Talks on phone: Not on file     Gets together: Not on file     Attends Religion service: Not on file     Active member of club or organization: Not on file     Attends meetings of clubs or organizations: Not on file Relationship status: Not on file    Intimate partner violence     Fear of current or ex partner: Not on file     Emotionally abused: Not on file     Physically abused: Not on file     Forced sexual activity: Not on file   Other Topics Concern    Not on file   Social History Narrative    She has lived in second-hand smoke most of her life. Family History   Problem Relation Age of Onset    Asthma Sister     Breast Cancer Sister 79    Cancer Brother         Prostate    Cancer Brother         Prostate    Deep Vein Thrombosis Father         Secondary to a motor vehicle accident.     Other Sister         The patient has a twin sister and an older sister both of whom have bronchiectasis with atypical mycobacteria infection    Breast Cancer Maternal Aunt [de-identified]    Breast Cancer Paternal Aunt [de-identified]    Colon Cancer Neg Hx     Ovarian Cancer Neg Hx      Allergies   Allergen Reactions    Duloxetine Unknown (comments) and Other (comments)    Augmentin [Amoxicillin-Pot Clavulanate] Diarrhea    Sulfa (Sulfonamide Antibiotics) Unknown (comments)    Sulfur Hives       Current Facility-Administered Medications   Medication Dose Route Frequency    guaiFENesin ER (MUCINEX) tablet 1,200 mg  1,200 mg Oral DAILY    [START ON 3/30/2021] venlafaxine (EFFEXOR) tablet 75 mg  75 mg Oral DAILY    [START ON 3/30/2021] metoprolol succinate (TOPROL-XL) XL tablet 50 mg  50 mg Oral DAILY    [START ON 3/30/2021] levothyroxine (SYNTHROID) tablet 75 mcg  75 mcg Oral DAILY    sodium chloride (NS) flush 5-40 mL  5-40 mL IntraVENous Q8H    sodium chloride (NS) flush 5-40 mL  5-40 mL IntraVENous PRN    acetaminophen (TYLENOL) tablet 650 mg  650 mg Oral Q6H PRN    Or    acetaminophen (TYLENOL) suppository 650 mg  650 mg Rectal Q6H PRN    polyethylene glycol (MIRALAX) packet 17 g  17 g Oral DAILY PRN    promethazine (PHENERGAN) tablet 12.5 mg  12.5 mg Oral Q6H PRN    Or    ondansetron (ZOFRAN) injection 4 mg  4 mg IntraVENous Q6H PRN  potassium chloride 10 mEq in 100 ml IVPB  10 mEq IntraVENous PRN    magnesium sulfate 2 g/50 ml IVPB (premix or compounded)  2 g IntraVENous PRN    levalbuterol (XOPENEX) nebulizer soln 1.25 mg/3 mL  1.25 mg Nebulization Q6H RT    budesonide (PULMICORT) 500 mcg/2 ml nebulizer suspension  500 mcg Nebulization BID RT    [START ON 3/30/2021] cefepime (MAXIPIME) 2 g in 0.9% sodium chloride (MBP/ADV) 100 mL MBP  2 g IntraVENous Q12H    guaiFENesin-dextromethorphan (ROBITUSSIN DM) 100-10 mg/5 mL syrup 5 mL  5 mL Oral Q8H    predniSONE (DELTASONE) tablet 60 mg  60 mg Oral DAILY WITH BREAKFAST    [START ON 3/30/2021] vancomycin (VANCOCIN) 1,000 mg in 0.9% sodium chloride 250 mL (VIAL-MATE)  1,000 mg IntraVENous Q18H    furosemide (LASIX) injection 20 mg  20 mg IntraVENous BID         Objective:     Vitals:    03/29/21 1541 03/29/21 1632 03/29/21 1823 03/29/21 1904   BP:  132/60 (!) 163/68 129/65   Pulse:  90 91 79   Resp:  18 20 20   Temp:   98.2 °F (36.8 °C) 98.6 °F (37 °C)   SpO2: 97% 94% 91% 94%   Weight:       Height:           PHYSICAL EXAM     Constitutional:  the patient is well developed and in no acute distress  EENMT:  Sclera clear, pupils equal, oral mucosa moist  Respiratory: Good bilateral air entry no crackles no wheezing  Cardiovascular:  RRR without M,G,R  Gastrointestinal: soft and non-tender; with positive bowel sounds. Musculoskeletal: warm without cyanosis. There is no lower extremity edema. Skin:  no jaundice or rashes, no wounds   Neurologic: no gross neuro deficits     Psychiatric:  alert and oriented x 3    CXR:        Recent Labs     03/29/21  0819   WBC 19.8*   HGB 12.1   HCT 38.4   *     Recent Labs     03/29/21  0819   *   K 4.1      *   CO2 30   BUN 10   CREA 0.79   CA 8.9   ALB 2.6*   TBILI 0.5   ALT 22     No results for input(s): PH, PCO2, PO2, HCO3, PHI, PCO2I, PO2I, HCO3I in the last 72 hours.   Recent Labs     03/29/21  1208 03/29/21  0920   LAC 1.9 1.5       Assessment:  (Medical Decision Making)     Hospital Problems  Date Reviewed: 11/23/2020          Codes Class Noted POA    Cough ICD-10-CM: R05  ICD-9-CM: 786.2  3/29/2021 Unknown        * (Principal) Parapneumonic effusion ICD-10-CM: J18.9, J91.8  ICD-9-CM: 511.89  3/29/2021 Unknown        Dyspnea ICD-10-CM: R06.00  ICD-9-CM: 786.09  3/29/2021 Unknown        Community acquired pneumonia ICD-10-CM: J18.9  ICD-9-CM: 514  3/29/2021 Unknown        Atrial fibrillation ([address] ICD-10-CM: I48.91  ICD-9-CM: 427.31  3/29/2021 Unknown        Acquired hypothyroidism (Chronic) ICD-10-CM: E03.9  ICD-9-CM: 244.9  5/10/2016 Yes        Bronchiectasis ([address] (Chronic) ICD-10-CM: J47.9  ICD-9-CM: 494.0  5/10/2016 Yes        GERD (gastroesophageal reflux disease) (Chronic) ICD-10-CM: K21.9  ICD-9-CM: 530.81  9/12/2013 Yes              Plan:  (Medical Decision Making)     --Acute respiratory failure with bilateral pleural effusion moderate pericardial effusion with high white count and history of bronchiectasis she has not been able to use her vest for a few weeks since she had her pacemaker this point start low-dose diuretics antibiotics wait for cultures Pro-Calc is reassuring    --Try to clear with cardiology if the patient can use the BA vest for bronchiectasis      --No urgent or emergent need to do thoracentesis I do not think patient has tamponade cardiology is assessing    --Continue with steroids and inhalers this seems like to be an volume status and moderate bronchiectasis exacerbation patient have benign looking pleural effusions do not seem to be loculated or infected this point will start with diuretics and assess accordingly.     --The effusions are benign looking if I think they are part of volume status situation with acute illness very unlikely parapneumonic and if there are they are very benign looking regards of pericardial effusion does not seem to be acting like tamponade cardiology is consulted    More than 50% of the time documented was spent in face-to-face contact with the patient and in the care of the patient on the floor/unit where the patient is located. Thank you very much for this referral.  We appreciate the opportunity to participate in this patient's care. Will follow along with above stated plan.     Tahir Deng MD

## 2021-03-30 NOTE — PROGRESS NOTES
ACUTE OT GOALS:  (Developed with and agreed upon by patient and/or caregiver.)  1. Pt will toilet independently  2. Pt will complete functional mobility for ADLs independently  3. Pt will complete lower body dressing independently- mod I using AE as needed  4. Pt will complete grooming and hygiene at sink independently  5. Pt will demonstrate independence with HEP to promote increased BUE strength and functional use for ADLs  6. Pt will tolerate 23 minutes functional activity with min or fewer rest breaks to promote increased endurance for ADLs  7.  Pt will independently demonstrate/ verbalize 2+ energy conservation techniques to promote increased endurance for ADLs      Timeframe: 7 days      OCCUPATIONAL THERAPY ASSESSMENT: Initial Assessment and Daily Note OT Treatment Day # 1    Jeana Tabor is a 70 y.o. female   PRIMARY DIAGNOSIS: Parapneumonic effusion  Community acquired pneumonia [J18.9]  Parapneumonic effusion [J18.9, J91.8]  Cough [R05]  Dyspnea [R06.00]  Procedure(s) (LRB):  ULTRASOUND (N/A)     Reason for Referral:  Generalized weakness  ICD-10: Treatment Diagnosis: Generalized Muscle Weakness (M62.81)  [place]PAT[place] / [place] B / Product Type: Medicare /   ASSESSMENT:     REHAB RECOMMENDATIONS:   Recommendation to date pending progress:  Setting:   No further skilled therapy   Equipment:    3 in 1 Bedside Commode     PRIOR LEVEL OF FUNCTION:  (Prior to Hospitalization)  INITIAL/CURRENT LEVEL OF FUNCTION:  (Based on today's evaluation)   Bathing:   Independent  Dressing:   Independent  Feeding/Grooming:   Independent  Toileting:   Independent  Functional Mobility:   Independent Bathing:   Contact Guard Assistance  Dressing:   Contact Guard Assistance  Feeding/Grooming:   Standby Assistance  Toileting:   Standby Assistance  Functional Mobility:   Standby Assistance     ASSESSMENT:  Ms. Melissa Mendoza presented generally weak with deficits in endurance, strength, mobility, and balance impacting ADLs. Pt endorsed fatigue with activity and quickly became weak and tremulous, reports weakness and decreased endurance for the last 2 weeks. SpO2 remained 96% on room air. Pt is below her functional baseline and would benefit from skilled OT services to address deficits, likely no d/c needs. SUBJECTIVE:   Ms. Giovana Cherry states, \"I've been weak for the last few weeks. \"    SOCIAL HISTORY/LIVING ENVIRONMENT: Lives with spouse, independent at baseline, does not use any DME. Walk in shower, 1L home.         OBJECTIVE:     PAIN: VITAL SIGNS: LINES/DRAINS:   Pre Treatment: Pain Screen  Pain Scale 1: Numeric (0 - 10)  Pain Intensity 1: 0  Post Treatment: 0   IV  O2 Device: Room air     GROSS EVALUATION:  BUE Within Functional Limits Abnormal/ Functional Abnormal/ Non-Functional (see comments) Not Tested Comments:   AROM [x] [] [] []    PROM [] [] [] []    Strength [] [x] [] []    Balance [] [x] [] []    Posture [] [] [] []    Sensation [] [] [] []    Coordination [x] [] [] []    Tone [] [] [] []    Edema [] [] [] []    Activity Tolerance [] [x] [] []     [] [] [] []      COGNITION/  PERCEPTION: Intact Impaired   (see comments) Comments:   Orientation [x] []    Vision [x] []    Hearing [x] []    Judgment/ Insight [x] []    Attention [x] []    Memory [x] []    Command Following [x] []    Emotional Regulation [x] []     [] []      ACTIVITIES OF DAILY LIVING: I Mod I S SBA CGA Min Mod Max Total NT Comments   BASIC ADLs:              Bathing/ Showering [] [] [] [] [] [] [] [] [] []    Toileting [] [] [] [x] [] [] [] [] [] []    Dressing [] [] [] [] [x] [] [] [] [] []    Feeding [] [] [] [] [] [] [] [] [] []    Grooming [] [] [] [x] [] [] [] [] [] []    Personal Device Care [] [] [] [] [] [] [] [] [] []    Functional Mobility [] [] [] [x] [x] [] [] [] [] []    I=Independent, Mod I=Modified Independent, S=Supervision, SBA=Standby Assistance, CGA=Contact Guard Assistance,   Min=Minimal Assistance, Mod=Moderate Assistance, Max=Maximal Assistance, Total=Total Assistance, NT=Not Tested    MOBILITY: I Mod I S SBA CGA Min Mod Max Total  NT x2 Comments:   Supine to sit [] [] [x] [] [] [] [] [] [] [] []    Sit to supine [] [] [] [] [] [] [] [] [] [] []    Sit to stand [] [] [] [] [x] [] [] [] [] [] []    Bed to chair [] [] [] [] [x] [] [] [] [] [] []    I=Independent, Mod I=Modified Independent, S=Supervision, SBA=Standby Assistance, CGA=Contact Guard Assistance,   Min=Minimal Assistance, Mod=Moderate Assistance, Max=Maximal Assistance, Total=Total Assistance, NT=Not Tested    [address] [place] [id number] AM-PAC 6 Clicks   Daily Activity Inpatient Short Form        How much help from another person does the patient currently need. .. Total A Lot A Little None   1. Putting on and taking off regular lower body clothing? [] 1   [] 2   [x] 3   [] 4   2. Bathing (including washing, rinsing, drying)? [] 1   [] 2   [x] 3   [] 4   3. Toileting, which includes using toilet, bedpan or urinal?   [] 1   [] 2   [x] 3   [] 4   4. Putting on and taking off regular upper body clothing? [] 1   [] 2   [x] 3   [] 4   5. Taking care of personal grooming such as brushing teeth? [] 1   [] 2   [] 3   [x] 4   6. Eating meals? [] 1   [] 2   [] 3   [x] 4   © 2007, Trustees of [address] [place] [address], under license to [employer]. All rights reserved     Score:  Initial: 20 Most Recent: X (Date: -- )   Interpretation of Tool:  Represents activities that are increasingly more difficult (i.e. Bed mobility, Transfers, Gait). PLAN:   FREQUENCY/DURATION: OT Plan of Care: 3 times/week for duration of hospital stay or until stated goals are met, whichever comes first.    PROBLEM LIST:   (Skilled intervention is medically necessary to address:)  1. Decreased ADL/Functional Activities  2. Decreased Activity Tolerance  3. Decreased Balance  4.  Decreased Strength   INTERVENTIONS PLANNED:   (Benefits and precautions of occupational therapy have been discussed with the patient.)  1. Self Care Training  2. Therapeutic Activity  3. Therapeutic Exercise/HEP  4. Neuromuscular Re-education  5. Education     TREATMENT:     EVALUATION: Low Complexity : (Untimed Charge)    TREATMENT:   ($$ Self Care/Home Management: 8-22 mins    )  Self Care (10 Minutes): Self care including Toileting, Lower Body Dressing and Grooming to increase independence.     TREATMENT GRID:  N/A    AFTER TREATMENT POSITION/PRECAUTIONS:  Chair, Needs within reach and RN notified    INTERDISCIPLINARY COLLABORATION:  RN/PCT    TOTAL TREATMENT DURATION:  OT Patient Time In/Time Out  Time In: 4264  Time Out: [address], OT

## 2021-03-30 NOTE — PROGRESS NOTES
Care Management Interventions  PCP Verified by CM: Yes(Dr. Julio Rodríguez)  Mode of Transport at Discharge: Other (see comment)( Rupinder Dumas)  Transition of Care Consult (CM Consult): Discharge Planning  Discharge Durable Medical Equipment: No  Physical Therapy Consult: Yes  Occupational Therapy Consult: Yes  Speech Therapy Consult: No  Current Support Network: Own Home, Lives with Spouse  Confirm Follow Up Transport: Family  [place] Resource Information Provided?: No  Discharge Location  Discharge Placement: Home    CM contacted patient via telephone as patient is Covid rule out. Patient  Rupinder Dumas answered the telephone and verified all demographic information to be correct. Rupinder Dumas stated patient does not use any DME at this time. Patient is independent with ADL's. Mr. Margarito Pierre stated he drives patient to her appointments. Patient is able to afford her needed medications and obtains them from [facility] pharmacy. Patient has never used [facility] or been to [facility]. Patient would like to return home at discharge. CM will continue to follow patient during hospitalization for discharge planning and needs. PT/OT have been consulted. Please consult or notify CM of new needs.

## 2021-03-30 NOTE — PROGRESS NOTES
Karishma Evans Admission Date: 3/29/2021 Daily Progress Note: 3/30/2021 The patient's chart is reviewed and the patient is discussed with the staff. 
 
 
70 y.o.  female seen and evaluated at the request of Dr. Gulshan Santiago. 
Patient is very pleasant 29-year-old  female past medical history of recent pacemaker placement for A. fib and sick sinus syndrome end of January history of bronchiectasis with 2years 2 episodes of flareup a year except last year since Covid had she has not been leaving the house she has been exerting herself with yard work around the house but since she had the pacemaker end of January she was told not to put the BA vest a to prevent hematoma device that she uses for her bronchiectasis She had been feeling sick for the past week she had nausea vomiting chills cough pleuritic chest pain and she had not been feeling well she was given a course of antibiotics steroids with partial improvement but today she feels that chest pain is still there other than that she feels okay cough and shortness of breath improved cough was dry. He had cough for almost 6 weeks. Patient does not know she has bronchiectasis this is a familial thing getting her sisters had that smoked in the house [facility] Subjective:  
Sob on ambulation Current Facility-Administered Medications Medication Dose Route Frequency  potassium chloride 20 mEq in 100 ml IVPB  20 mEq IntraVENous Q2H  
 famotidine (PEPCID) tablet 40 mg  40 mg Oral DAILY  guaiFENesin ER (MUCINEX) tablet 1,200 mg  1,200 mg Oral DAILY  venlafaxine (EFFEXOR) tablet 75 mg  75 mg Oral DAILY  metoprolol succinate (TOPROL-XL) XL tablet 50 mg  50 mg Oral DAILY  levothyroxine (SYNTHROID) tablet 75 mcg  75 mcg Oral DAILY  sodium chloride (NS) flush 5-40 mL  5-40 mL IntraVENous Q8H  
 sodium chloride (NS) flush 5-40 mL  5-40 mL IntraVENous PRN  
 acetaminophen (TYLENOL) tablet 650 mg  650 mg Oral Q6H PRN Or  
 acetaminophen (TYLENOL) suppository 650 mg  650 mg Rectal Q6H PRN  polyethylene glycol (MIRALAX) packet 17 g  17 g Oral DAILY PRN  promethazine (PHENERGAN) tablet 12.5 mg  12.5 mg Oral Q6H PRN Or  
 ondansetron (ZOFRAN) injection 4 mg  4 mg IntraVENous Q6H PRN  potassium chloride 10 mEq in 100 ml IVPB  10 mEq IntraVENous PRN  
 magnesium sulfate 2 g/50 ml IVPB (premix or compounded)  2 g IntraVENous PRN  
 levalbuterol (XOPENEX) nebulizer soln 1.25 mg/3 mL  1.25 mg Nebulization Q6H RT  
 budesonide (PULMICORT) 500 mcg/2 ml nebulizer suspension  500 mcg Nebulization BID RT  
 cefepime (MAXIPIME) 2 g in 0.9% sodium chloride (MBP/ADV) 100 mL MBP  2 g IntraVENous Q12H  
 guaiFENesin-dextromethorphan (ROBITUSSIN DM) 100-10 mg/5 mL syrup 5 mL  5 mL Oral Q8H  predniSONE (DELTASONE) tablet 60 mg  60 mg Oral DAILY WITH BREAKFAST  vancomycin (VANCOCIN) 1,000 mg in 0.9% sodium chloride 250 mL (VIAL-MATE)  1,000 mg IntraVENous Q18H  
 furosemide (LASIX) injection 20 mg  20 mg IntraVENous BID Review of Systems Constitutional: negative for fever, chills, sweats Cardiovascular: negative for chest pain, palpitations, syncope, edema Gastrointestinal:  negative for dysphagia, reflux, vomiting, diarrhea, abdominal pain, or melena Neurologic:  negative for focal weakness, numbness, headache Objective:  
 
Vitals:  
 03/30/21 6005 03/30/21 7366 03/30/21 0805 03/30/21 0065 BP: 113/60 125/61  127/62 Pulse: 94 89  (!) 108 Resp: 20 18  20 Temp: 98.6 °F (37 °C) 98.1 °F (36.7 °C)  98 °F (36.7 °C) SpO2: 91% 93% 94% 94% Weight:      
Height:      
 
 
 
Intake/Output Summary (Last 24 hours) at 3/30/2021 1040 Last data filed at 3/30/2021 6351 Gross per 24 hour Intake 510 ml Output 1125 ml Net -615 ml Physical Exam:  
Constitution:  the patient is well developed and in no acute distress EENMT:  Sclera clear, pupils equal, oral mucosa moist 
Respiratory: decreased breath sounds left base Cardiovascular:  RRR without M,G,R 
Gastrointestinal: soft and non-tender; with positive bowel sounds. Musculoskeletal: warm without cyanosis. There is no lower extremity edema. Skin:  no jaundice or rashes, no wounds Neurologic: no gross neuro deficits Psychiatric:  alert and oriented x 3 CXR:  
 
 
LAB No results for input(s): GLUCPOC in the last 72 hours. No lab exists for component: [place] Recent Labs  
  03/30/21 
0423 03/29/21 
0988 WBC 24.1* 19.8* HGB 10.4* 12.1 HCT 32.5* 38.4 * 606* Recent Labs  
  03/30/21 
0423 03/29/21 
4096  135* K 3.3* 4.1  102 CO2 25 30 * 101* BUN 9 10 CREA 0.78 0.79 MG 2.4  --   
CA 8.5 8.9 ALB  --  2.6* TBILI  --  0.5 ALT  --  22 No results for input(s): PH, PCO2, PO2, HCO3, PHI, PCO2I, PO2I, HCO3I in the last 72 hours. Recent Labs  
  03/29/21 
1208 03/29/21 
0920 LAC 1.9 1.5 Assessment:  (Medical Decision Making) Hospital Problems  Date Reviewed: 11/23/2020 Codes Class Noted POA Cough ICD-10-CM: R05 ICD-9-CM: 786.2  3/29/2021 Unknown * (Principal) Parapneumonic effusion ICD-10-CM: J18.9, J91.8 ICD-9-CM: 511.89  3/29/2021 Unknown  
 moderate Dyspnea ICD-10-CM: R06.00 
ICD-9-CM: 786.09  3/29/2021 Unknown Community acquired pneumonia ICD-10-CM: J18.9 ICD-9-CM: 938  3/29/2021 Unknown Iv antibx Atrial fibrillation [facility]) ICD-10-CM: I48.91 
ICD-9-CM: 427.31  3/29/2021 Unknown Acquired hypothyroidism (Chronic) ICD-10-CM: E03.9 ICD-9-CM: 244.9  5/10/2016 Yes Bronchiectasis ([facility] [address] (Chronic) ICD-10-CM: J47.9 ICD-9-CM: 494.0  5/10/2016 Yes GERD (gastroesophageal reflux disease) (Chronic) ICD-10-CM: K21.9 ICD-9-CM: 530.81  9/12/2013 Yes Plan:  (Medical Decision Making) 1   Iv antibx-maxipime /vanc 2   Ultrasound/ thoracentesis today or tomorrow 
-- 
 
More than 50% of the time documented was spent in face-to-face contact with the patient and in the care of the patient on the floor/unit where the patient is located.  
 
Geovani Sim MD

## 2021-03-30 NOTE — CONSULTS
Subjective:     Patient is a 70 y.o.  female presents withchest pressure/discomfort, dyspnea, tachypnea. Onset of symptoms was gradual and 1 week ago with gradually worsening course since that time. The chest pressure/discomfort, dyspnea, orthopnea is constant and is rated as moderate. The dyspnea. is aggravated exertion with mild activity  . Had CT scan showing pleural effusions and pericardial effusion. Pt states she had pericarditis 25 years ago. Echo today show small pericardial effusion with no hemodynamic effects    Patient Active Problem List    Diagnosis Date Noted    Cough 03/29/2021    Parapneumonic effusion 03/29/2021    Dyspnea 03/29/2021    Community acquired pneumonia 03/29/2021    Atrial fibrillation ([address] 03/29/2021    Acquired hypothyroidism 05/10/2016    Bronchiectasis ([address] 05/10/2016    Abnormal chest CT 02/05/2015    Osteopenia 12/15/2014    Allergic rhinitis due to pollen 09/12/2013    GERD (gastroesophageal reflux disease) 09/12/2013    Chronic arthritis 09/12/2013     Past Medical History:   Diagnosis Date    Abnormal chest CT February, 2015    The patient had a CT scan that showed infiltrative as well as tree-in-bud changes felt to probably represent atypical mycobacteria infection. However, bronchoscopy with BAL was negative. 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  Graves disease 2010    Thyroidectomy    Ineffective airway clearance     Bronchoscopy: 10/3/11, moderate, thick purulent mucus in the right main bronchus, preliminary positive for Aspergillus flavus.     LLL pneumonia 2010    treated as outpatient    Menopause       Past Surgical History:   Procedure Laterality Date    HX CATARACT REMOVAL Bilateral     HX CHOLECYSTECTOMY  2012    HX HYSTERECTOMY      HX ORTHOPAEDIC      right foot surgery    HX OTHER SURGICAL  February, 2016    Bronchoscopy with BAL    HX SALPINGO-OOPHORECTOMY Bilateral     HX THYROIDECTOMY  2010      [unfilled]  Allergies   Allergen Reactions    Duloxetine Unknown (comments) and Other (comments)    Augmentin [Amoxicillin-Pot Clavulanate] Diarrhea    Sulfa (Sulfonamide Antibiotics) Unknown (comments)    Sulfur Hives      Social History     Tobacco Use    Smoking status: Never Smoker    Smokeless tobacco: Never Used   Substance Use Topics    Alcohol use: No     Alcohol/week: 0.0 standard drinks      Family History   Problem Relation Age of Onset    Asthma Sister     Breast Cancer Sister 79    Cancer Brother         Prostate    Cancer Brother         Prostate    Deep Vein Thrombosis Father         Secondary to a motor vehicle accident.  Other Sister         The patient has a twin sister and an older sister both of whom have bronchiectasis with atypical mycobacteria infection    Breast Cancer Maternal Aunt [de-identified]    Breast Cancer Paternal Aunt [de-identified]    Colon Cancer Neg Hx     Ovarian Cancer Neg Hx       Review of Systems  Constitutional: negative for fevers  Respiratory: positive for cough or dyspnea on exertion  Cardiovascular: negative for chest pain, palpitations  Gastrointestinal: negative for dyspepsia and reflux symptoms  Neurological: negative for headaches and dizziness    Objective:     Patient Vitals for the past 8 hrs:   BP Temp Pulse Resp SpO2   03/29/21 1904 129/65 98.6 °F (37 °C) 79 20 94 %   03/29/21 1823 (!) 163/68 98.2 °F (36.8 °C) 91 20 91 %   03/29/21 1632 132/60  90 18 94 %   03/29/21 1541     97 %   03/29/21 1318 (!) 119/58  72 16 98 %     No intake/output data recorded. 03/28 0701 - 03/29 1900  In: 390 [P.O.:240;  I.V.:150]  Out: -   [unfilled]  [unfilled]    Physical Exam:  GENERAL: alert, cooperative, no distress, appears stated age  LUNG: clear to auscultation bilaterally  HEART: regular rate and rhythm, S1, S2 normal, no murmur, click, rub or gallop  ABDOMEN: soft, non-tender. Bowel sounds normal. No masses,  no organomegaly  EXTREMITIES:  extremities normal, atraumatic, no cyanosis or edema  NEUROLOGIC: AOx3. Cranial nerves 2-12 and sensation grossly intact. ECG: normal sinus rhythm, nonspecific ST and T waves changes     Data Review:     Recent Results (from the past 24 hour(s))   D DIMER    Collection Time: 03/29/21  8:18 AM   Result Value Ref Range    D DIMER 8.88 (H) <0.56 ug/ml(FEU)   CBC WITH AUTOMATED DIFF    Collection Time: 03/29/21  8:19 AM   Result Value Ref Range    WBC 19.8 (H) 4.3 - 11.1 K/uL    RBC 4.13 4.05 - 5.2 M/uL    HGB 12.1 11.7 - 15.4 g/dL    HCT 38.4 35.8 - 46.3 %    MCV 93.0 79.6 - 97.8 FL    MCH 29.3 26.1 - 32.9 PG    MCHC 31.5 31.4 - 35.0 g/dL    RDW 13.9 11.9 - 14.6 %    PLATELET 361 (H) 112 - 450 K/uL    MPV 9.5 9.4 - 12.3 FL    ABSOLUTE NRBC 0.00 0.0 - 0.2 K/uL    DF AUTOMATED      NEUTROPHILS 79 (H) 43 - 78 %    LYMPHOCYTES 11 (L) 13 - 44 %    MONOCYTES 9 4.0 - 12.0 %    EOSINOPHILS 1 0.5 - 7.8 %    BASOPHILS 1 0.0 - 2.0 %    IMMATURE GRANULOCYTES 1 0.0 - 5.0 %    ABS. NEUTROPHILS 15.6 (H) 1.7 - 8.2 K/UL    ABS. LYMPHOCYTES 2.1 0.5 - 4.6 K/UL    ABS. MONOCYTES 1.7 (H) 0.1 - 1.3 K/UL    ABS. EOSINOPHILS 0.1 0.0 - 0.8 K/UL    ABS. BASOPHILS 0.1 0.0 - 0.2 K/UL    ABS. IMM. GRANS. 0.2 0.0 - 0.5 K/UL   METABOLIC PANEL, COMPREHENSIVE    Collection Time: 03/29/21  8:19 AM   Result Value Ref Range    Sodium 135 (L) 136 - 145 mmol/L    Potassium 4.1 3.5 - 5.1 mmol/L    Chloride 102 98 - 107 mmol/L    CO2 30 21 - 32 mmol/L    Anion gap 3 (L) 7 - 16 mmol/L    Glucose 101 (H) 65 - 100 mg/dL    BUN 10 8 - 23 MG/DL    Creatinine 0.79 0.6 - 1.0 MG/DL    GFR est AA >60 >60 ml/min/1.73m2    GFR est non-AA >60 >60 ml/min/1.73m2    Calcium 8.9 8.3 - 10.4 MG/DL    Bilirubin, total 0.5 0.2 - 1.1 MG/DL    ALT (SGPT) 22 12 - 65 U/L    AST (SGOT) 15 15 - 37 U/L    Alk.  phosphatase 173 (H) 50 - 136 U/L    Protein, total 7.8 6.3 - 8.2 g/dL    Albumin 2.6 (L) 3.2 - 4.6 g/dL    Globulin 5.2 (H) 2.3 - 3.5 g/dL    A-G Ratio 0.5 (L) 1.2 - 3.5     PROCALCITONIN    Collection Time: 03/29/21  8:19 AM   Result Value Ref Range    Procalcitonin 0.08 ng/mL   TROPONIN-HIGH SENSITIVITY    Collection Time: 03/29/21  8:19 AM   Result Value Ref Range    Troponin-High Sensitivity 6.0 0 - 14 pg/mL   CRP, HIGH SENSITIVITY    Collection Time: 03/29/21  8:19 AM   Result Value Ref Range    CRP, High sensitivity 241.0 mg/L   LACTIC ACID    Collection Time: 03/29/21  9:20 AM   Result Value Ref Range    Lactic acid 1.5 0.4 - 2.0 MMOL/L   EKG, 12 LEAD, INITIAL    Collection Time: 03/29/21  9:30 AM   Result Value Ref Range    Ventricular Rate 82 BPM    Atrial Rate 82 BPM    P-R Interval 162 ms    QRS Duration 68 ms    Q-T Interval 346 ms    QTC Calculation (Bezet) 404 ms    Calculated P Axis 70 degrees    Calculated R Axis 63 degrees    Calculated T Axis 16 degrees    Diagnosis       !! AGE AND GENDER SPECIFIC ECG ANALYSIS !! Sinus rhythm with Premature atrial complexes  Possible Left atrial enlargement  Nonspecific T wave abnormality  Abnormal ECG  No previous ECGs available  Confirmed by ST DELORIS ESPINOSA MD (), ALCIRA MAI ([id number]) on 3/29/2021 3:50:39 PM     LACTIC ACID    Collection Time: 03/29/21 12:08 PM   Result Value Ref Range    Lactic acid 1.9 0.4 - 2.0 MMOL/L   SED RATE, AUTOMATED    Collection Time: 03/29/21  1:47 PM   Result Value Ref Range    Sed rate, automated 70 (H) 0 - 30 mm/hr       was negative for acute cardiopulmonary process    Assessment:     Principal Problem:    Parapneumonic effusion (3/29/2021)    Likely inflammatory. Monitor clinically    Active Problems:    GERD (gastroesophageal reflux disease) (9/12/2013)    The current medical regimen is effective;  continue present plan and medications. Acquired hypothyroidism (5/10/2016)    Check tsh t4 if not done.  Sometimes pericardial effusion related to thyroid abnormalities      Bronchiectasis ([facility] [address] (5/10/2016)     The current medical regimen is effective;  continue present plan and medications. Cough (3/29/2021)           Dyspnea (3/29/2021)     The current medical regimen is effective;  continue present plan and medications. Community acquired pneumonia (3/29/2021)    Continue current meds      Atrial fibrillation ([facility] [address] (3/29/2021)    Currently nsr with PACs     pericardial effusion  Small. No hemodynamic issues. No need to workup further.      Stacy Obrien MD

## 2021-03-30 NOTE — PROGRESS NOTES
03/29/21 1825   Dual Skin Pressure Injury Assessment   Dual Skin Pressure Injury Assessment WDL   Second Care Provider (Based on [address] [place] [address]) Nelson Tovar

## 2021-03-30 NOTE — PROGRESS NOTES
[facility]  Admission Date: 3/29/2021             Daily Progress Note: 3/30/2021    The patient's chart is reviewed and the patient is discussed with the staff. Patient is a 70 y.o.  female seen and evaluated at the request of Dr. Irish Sparks.  Patient is very pleasant 72-year-old  female past medical history of recent pacemaker placement for A. fib and sick sinus syndrome end of January history of bronchiectasis with 2years 2 episodes of flareup a year except last year since Covid had she has not been leaving the house she has been exerting herself with yard work around the house but since she had the pacemaker end of January she was told not to put the BA vest a to prevent hematoma device that she uses for her bronchiectasis  She had been feeling sick for the past week she had nausea vomiting chills cough pleuritic chest pain and she had not been feeling well she was given a course of antibiotics steroids with partial improvement but today she feels that chest pain is still there other than that she feels okay cough and shortness of breath improved cough was dry. He had cough for almost 6 weeks. Patient does not know she has bronchiectasis this is a familial thing getting her sisters had that smoked in the house they used [employer] before. Subjective:     Feeling much better this AM. Diuresing. Afebrile. Reports hx of aspergillus lung infection years ago. Had COVID vaccine x 2 in February. Test last weak was negative.      Current Facility-Administered Medications   Medication Dose Route Frequency    potassium chloride 20 mEq in 100 ml IVPB  20 mEq IntraVENous Q2H    famotidine (PEPCID) tablet 40 mg  40 mg Oral DAILY    guaiFENesin ER (MUCINEX) tablet 1,200 mg  1,200 mg Oral DAILY    venlafaxine (EFFEXOR) tablet 75 mg  75 mg Oral DAILY    metoprolol succinate (TOPROL-XL) XL tablet 50 mg  50 mg Oral DAILY    levothyroxine (SYNTHROID) tablet 75 mcg  75 mcg Oral DAILY    sodium chloride (NS) flush 5-40 mL  5-40 mL IntraVENous Q8H    sodium chloride (NS) flush 5-40 mL  5-40 mL IntraVENous PRN    acetaminophen (TYLENOL) tablet 650 mg  650 mg Oral Q6H PRN    Or    acetaminophen (TYLENOL) suppository 650 mg  650 mg Rectal Q6H PRN    polyethylene glycol (MIRALAX) packet 17 g  17 g Oral DAILY PRN    promethazine (PHENERGAN) tablet 12.5 mg  12.5 mg Oral Q6H PRN    Or    ondansetron (ZOFRAN) injection 4 mg  4 mg IntraVENous Q6H PRN    potassium chloride 10 mEq in 100 ml IVPB  10 mEq IntraVENous PRN    magnesium sulfate 2 g/50 ml IVPB (premix or compounded)  2 g IntraVENous PRN    levalbuterol (XOPENEX) nebulizer soln 1.25 mg/3 mL  1.25 mg Nebulization Q6H RT    budesonide (PULMICORT) 500 mcg/2 ml nebulizer suspension  500 mcg Nebulization BID RT    cefepime (MAXIPIME) 2 g in 0.9% sodium chloride (MBP/ADV) 100 mL MBP  2 g IntraVENous Q12H    guaiFENesin-dextromethorphan (ROBITUSSIN DM) 100-10 mg/5 mL syrup 5 mL  5 mL Oral Q8H    predniSONE (DELTASONE) tablet 60 mg  60 mg Oral DAILY WITH BREAKFAST    vancomycin (VANCOCIN) 1,000 mg in 0.9% sodium chloride 250 mL (VIAL-MATE)  1,000 mg IntraVENous Q18H    furosemide (LASIX) injection 20 mg  20 mg IntraVENous BID       Review of Systems    Constitutional: negative for fever, chills, sweats  Cardiovascular: negative for chest pain, palpitations, syncope, edema  Gastrointestinal:  negative for dysphagia, reflux, vomiting, diarrhea, abdominal pain, or melena  Neurologic:  negative for focal weakness, numbness, headache    Objective:     Vitals:    03/30/21 0336 03/30/21 0755 03/30/21 0805 03/30/21 0823   BP: 113/60 125/61  127/62   Pulse: 94 89  (!) 108   Resp: 20 18  20   Temp: 98.6 °F (37 °C) 98.1 °F (36.7 °C)  98 °F (36.7 °C)   SpO2: 91% 93% 94% 94%   Weight:       Height:             Intake/Output Summary (Last 24 hours) at 3/30/2021 1019  Last data filed at 3/30/2021 0949  Gross per 24 hour   Intake 510 ml Output 1125 ml   Net -615 ml       Physical Exam:   Constitution:  the patient is well developed and in no acute distress on RA, sat 94%  EENMT:  Sclera clear, pupils equal, oral mucosa moist  Respiratory: mildly decreased BS L base  Cardiovascular:  RRR without M,G,R  Gastrointestinal: soft and non-tender; with positive bowel sounds. Musculoskeletal: warm without cyanosis. There is no lower extremity edema. Skin:  no jaundice or rashes  Neurologic: no gross neuro deficits     Psychiatric:  alert and oriented x 3    CXR:         Chest CT: 3/29/21          FINDINGS:  - LUNGS: There are bilateral pleural effusions, left greater than right. Associated atelectasis in both lung bases, also left greater than right.  - HEART/VESSELS: Normal enhancement of pulmonary arteries and aorta. There is a  pericardial effusion, 18 mm in greatest thickness.     - MEDIASTINUM/AXILLA: No significant adenopathy.  - CHEST WALL/BONES: Normal.  - UPPER ABDOMEN: Mild diffuse fatty infiltration of the liver. Several slightly  prominent vessels are noted near the left adrenal gland, likely a vascular  malformation or varices.     IMPRESSION  1. Bilateral pleural effusions and bibasilar atelectasis, left greater than  right. 2.  Moderate size pericardial effusion. 3.  No evidence of pulmonary embolus.     ECHO: 3/29/21    PROCEDURE: This was a routine study. A transthoracic echocardiogram was  performed. The study included complete 2D imaging, M-mode, complete spectral  Doppler, and color Doppler. Image quality was adequate.     LEFT VENTRICLE: Normal diastolic function with an average E/e' of 12.9 Size   was  normal. Systolic function was normal. Ejection fraction was estimated in the  range of 55 % to 60 %. There were no regional wall motion abnormalities.  Wall  thickness was normal.     RIGHT VENTRICLE: The size was normal. Systolic function was normal. The  tricuspid jet envelope definition was inadequate for estimation of RV systolic  pressure.     LEFT ATRIUM: Size was normal.     RIGHT ATRIUM: Size was normal.     SYSTEMIC VEINS: IVC: The inferior vena cava was normal in size and course.     AORTIC VALVE: The valve was structurally normal, tri-commissural. There was   no  evidence for stenosis. There was no insufficiency.     MITRAL VALVE: Valve structure was normal. There was no evidence for stenosis. There was no regurgitation.     TRICUSPID VALVE: The valve structure was normal. There was no evidence for  stenosis. There was no regurgitation.     PULMONIC VALVE: The valve structure was normal. There was no evidence for  stenosis. There was trivial regurgitation.     PERICARDIUM: A small pericardial effusion was identified. There was no   evidence  of hemodynamic compromise.     AORTA: The root exhibited normal size.     SUMMARY:     -  Left ventricle: Normal diastolic function with an average E/e' of 17.9  Systolic function was normal. Ejection fraction was estimated in the range of  55 % to 60 %. There were no regional wall motion abnormalities.     -  Pericardium: A small pericardial effusion was identified. There was no  evidence of hemodynamic compromise. LAB  No results for input(s): GLUCPOC in the last 72 hours. No lab exists for component: [place]   Recent Labs     03/30/21  0423 03/29/21  0819   WBC 24.1* 19.8*   HGB 10.4* 12.1   HCT 32.5* 38.4   * 606*     Recent Labs     03/30/21  0423 03/29/21  0819    135*   K 3.3* 4.1    102   CO2 25 30   * 101*   BUN 9 10   CREA 0.78 0.79   MG 2.4  --    CA 8.5 8.9   ALB  --  2.6*   TBILI  --  0.5   ALT  --  22     No results for input(s): PH, PCO2, PO2, HCO3, PHI, PCO2I, PO2I, HCO3I in the last 72 hours. Recent Labs     03/29/21  1208 03/29/21  0920   LAC 1.9 1.5     Results for Sharan HARPER (MRN [id number]) as of 3/30/2021 10:15   Ref.  Range 3/29/2021 08:19 3/29/2021 09:20 3/29/2021 12:08 3/30/2021 04:23   Procalcitonin Latest Units: ng/mL 0.08      Troponin-High Sensitivity Latest Ref Range: 0 - 14 pg/mL 6.0      NT pro-BNP Latest Ref Range: 5 - 125 PG/ML    590 (H)   CRP, High sensitivity Latest Units: mg/L 241.0        Blood cx: NG  Sputum cx: NG      Assessment:  (Medical Decision Making)     Hospital Problems  Date Reviewed: 11/23/2020          Codes Class Noted POA    Cough ICD-10-CM: R05  ICD-9-CM: 786.2  3/29/2021 Unknown    Chronic    * (Principal) Parapneumonic effusion ICD-10-CM: J18.9, J91.8  ICD-9-CM: 511.89  3/29/2021 Unknown    PCT .08 suggesting lack of bacterial infection but need to repeat. Currently on Vanc and Cefepime. Dyspnea ICD-10-CM: R06.00  ICD-9-CM: 786.09  3/29/2021 Unknown    Better with diuresis. Community acquired pneumonia ICD-10-CM: J18.9  ICD-9-CM: 197  3/29/2021 Unknown    May be more exac of bronchiectasis    Atrial fibrillation ([address] ICD-10-CM: I48.91  ICD-9-CM: 427.31  3/29/2021 Unknown    Controlled VR    Acquired hypothyroidism (Chronic) ICD-10-CM: E03.9  ICD-9-CM: 244.9  5/10/2016 Yes        Bronchiectasis ([address] (Chronic) ICD-10-CM: J47.9  ICD-9-CM: 494.0  5/10/2016 Yes    Uses Vest rx at home. GERD (gastroesophageal reflux disease) (Chronic) ICD-10-CM: K21.9  ICD-9-CM: 530.81  9/12/2013 Yes            Thrombocytosis- etiology ? [place] [facility] Check iron levels. Plan:  (Medical Decision Making)     Continue ABx for now. Repeat PCT in AM. If still low and cx negative, can stop Vanc tomorrow. Check CXR in AM- PA & lateral.  Follow I/Os. Add on iron. Continue lasix today. Continue prednisone today, wean tomorrow if stable. --    More than 50% of the time documented was spent in face-to-face contact with the patient and in the care of the patient on the floor/unit where the patient is located. Gia Diaz MD    ADDENDUM:    Iron level very low at 14. May be contributing to thrombocytosis. Will start ferrous sulfate.     Gia Diaz MD

## 2021-03-30 NOTE — PROGRESS NOTES
ACUTE PHYSICAL THERAPY GOALS:  (Developed with and agreed upon by patient and/or caregiver. )  LTG:  (1.)Ms. Mk Gonzalez will move from supine to sit and sit to supine  with INDEPENDENCE within 7 treatment day(s). (2.)Ms. Mk Gonzalez will transfer from bed to chair and chair to bed with INDEPENDENT using the least restrictive device within 7 treatment day(s). (3.)Ms. Mk Gonzalez will ambulate with INDEPENDENCE for 500 feet with the least restrictive device within 7 treatment day(s). (4.)Ms. Mk Gonzalez will perform standing static and dynamic balance activities x 20 minutes with INDEPENDENT to improve safety within 7 treatment day(s). (5.)Ms. Mk Gonzalez will maintain stable vital signs throughout all functional mobility within 7 treatment days.       ________________________________________________________________________________________________        PHYSICAL THERAPY ASSESSMENT: Initial Assessment, Daily Note and AM PT Treatment Day # 1      Chiquita Burgess is a 70 y.o. female   PRIMARY DIAGNOSIS: Parapneumonic effusion  Community acquired pneumonia [J18.9]  Parapneumonic effusion [J18.9, J91.8]  Cough [R05]  Dyspnea [R06.00]  Procedure(s) (LRB):  ULTRASOUND (N/A)     Reason for Referral:  Parapneumatic Effusion  ICD-10: Treatment Diagnosis: Generalized Muscle Weakness (M62.81)  Difficulty in walking, Not elsewhere classified (R26.2)  [place]PAT[place] / [place] / Product Type: Medicare /     ASSESSMENT:     REHAB RECOMMENDATIONS:   Recommendation to date pending progress:  Setting:   No further skilled therapy   Equipment:    None     PRIOR LEVEL OF FUNCTION:  (Prior to Hospitalization) INITIAL/CURRENT LEVEL OF FUNCTION:  (Most Recently Demonstrated)   Bed Mobility:   Independent  Sit to Stand:   Independent  Transfers:   Independent  Gait/Mobility:   Independent Bed Mobility:   Supervision  Sit to Stand:   Supervision  Transfers:   Supervision  Gait/Mobility:   Supervision ASSESSMENT:   Ms. Colt Weeks is a 70year old F who presents fairly active and independent with all mobility and good standing balance. Her PLOF included walking and exercising and hiking, until a couple of weeks ago when she began to notice SANTIAGO and chest pain. She lives with spouse in 2 story home, but bedroom is on first floor. Secondary to COVID precautions, ambulation only able to be performed in the room, however no AD required and no SANTIAGO reported. This date pt performs mobility including STS and ambulation with no AD and supervision. Pt presents as functioning below her baseline, with deficits in mobility including transfers, gait, balance, and activity tolerance. Pt will benefit from skilled therapy services to address stated deficits to promote return to highest level of function, independence, and safety. Will continue to follow. .     SUBJECTIVE:   Ms. Colt Weeks states, \"I am feeling much better today. \"    SOCIAL HISTORY/LIVING ENVIRONMENT: Lives with spouse in 2 story home with bedroom on 1st floor.    Independent with ambulation and ADLs previously  No DME     OBJECTIVE:     PAIN: VITAL SIGNS: LINES/DRAINS:   Pre Treatment:    Post Treatment: 0   IV  O2 Device: Room air     GROSS EVALUATION:  B LE Within Functional Limits Abnormal/ Functional Abnormal/ Non-Functional (see comments) Not Tested Comments:   AROM [x] [] [] []    PROM [x] [] [] []    Strength [x] [] [] []    Balance [x] [] [] []    Posture [x] [] [] []    Sensation [x] [] [] []    Coordination [] [] [] [x]    Tone [] [] [] [x]    Edema [] [] [] [x]    Activity Tolerance [] [x] [] [] Below her baseline    [] [] [] []      COGNITION/  PERCEPTION: Intact Impaired   (see comments) Comments:   Orientation [x] []    Vision [x] []    Hearing [x] []    Command Following [x] []    Safety Awareness [x] []     [] []      MOBILITY: I Mod I S SBA CGA Min Mod Max Total  NT x2 Comments:   Bed Mobility    Rolling [] [] [] [] [] [] [] [] [] [x] []    Supine to Sit [] [] [] [] [] [] [] [] [] [x] []    Scooting [x] [] [] [] [] [] [] [] [] [] []    Sit to Supine [] [] [] [] [] [] [] [] [] [x] []    Transfers    Sit to Stand [] [] [x] [] [] [] [] [] [] [] []    Bed to Chair [] [] [] [] [] [] [] [] [] [x] []    Stand to Sit [] [] [x] [] [] [] [] [] [] [] []    I=Independent, Mod I=Modified Independent, S=Supervision, SBA=Standby Assistance, CGA=Contact Guard Assistance,   Min=Minimal Assistance, Mod=Moderate Assistance, Max=Maximal Assistance, Total=Total Assistance, NT=Not Tested  GAIT: I Mod I S SBA CGA Min Mod Max Total  NT x2 Comments:   Level of Assistance [] [] [x] [] [] [] [] [] [] [] []    Distance 30ft around room    DME None    Gait Quality Good, WFL    Weightbearing Status N/A     I=Independent, Mod I=Modified Independent, S=Supervision, SBA=Standby Assistance, CGA=Contact Guard Assistance,   Min=Minimal Assistance, Mod=Moderate Assistance, Max=Maximal Assistance, Total=Total Assistance, NT=Not [facility]       How much difficulty does the patient currently have. .. Unable A Lot A Little None   1. Turning over in bed (including adjusting bedclothes, sheets and blankets)? [] 1   [] 2   [] 3   [x] 4   2. Sitting down on and standing up from a chair with arms ( e.g., wheelchair, bedside commode, etc.)   [] 1   [] 2   [] 3   [x] 4   3. Moving from lying on back to sitting on the side of the bed? [] 1   [] 2   [] 3   [x] 4   How much help from another person does the patient currently need. .. Total A Lot A Little None   4. Moving to and from a bed to a chair (including a wheelchair)? [] 1   [] 2   [] 3   [x] 4   5. Need to walk in hospital room? [] 1   [] 2   [] 3   [x] 4   6. Climbing 3-5 steps with a railing? [] 1   [] 2   [x] 3   [] 4   © 2007, Trustees of [address] [place] [address] [address] [id number], under license to [facility].  All rights reserved     Score:  Initial: 25 Most Recent: X (Date: -- ) Interpretation of Tool:  Represents activities that are increasingly more difficult (i.e. Bed mobility, Transfers, Gait). PLAN:   FREQUENCY/DURATION: PT Plan of Care: 3 times/week for duration of hospital stay or until stated goals are met, whichever comes first.    PROBLEM LIST:   (Skilled intervention is medically necessary to address:)  1. Decreased ADL/Functional Activities  2. Decreased Activity Tolerance  3. Decreased Gait Ability  4. Decreased Strength  5. Decreased Transfer Abilities   INTERVENTIONS PLANNED:   (Benefits and precautions of physical therapy have been discussed with the patient.)  1. Therapeutic Activity  2. Therapeutic Exercise/HEP  3. Neuromuscular Re-education  4. Gait Training  5. Education     TREATMENT:     EVALUATION: Low Complexity : (Untimed Charge)    TREATMENT:   ($$ Gait Trainin-22 mins    )  Gait Training (10 Minutes): Gait training for 30 feet utilizing None. Patient required Verbal cueing to improve Activity Pacing.      TREATMENT GRID:  N/A    AFTER TREATMENT POSITION/PRECAUTIONS:  Chair, Needs within reach and RN notified    INTERDISCIPLINARY COLLABORATION:  RN/PCT and PT/PTA    TOTAL TREATMENT DURATION:  PT Patient Time In/Time Out  Time In: 1030  Time Out: Olga [id number]

## 2021-03-30 NOTE — PROCEDURES
PROCEDURE:    DIAGNOSTIC/THERAPEUTIC THORACENTESIS/PLEURAL MANNOMETRY. PRE-OP DIAGNOSIS:    L PLEURAL EFFUSION    POST-OP DIAGNOSIS:    L PLEURAL EFFUSION    ASSISTANT:    Opal    ANESTHESIA:    LOCAL ANESTHESIA WITH 1% LIDOCAINE 10 CC TOTAL. CHEST ULTRASOUND FINDINGS:    A Turbo-M, Sonosite ultrasound with a 5-16 mHz probe was used to image the chest and localize the pleural effusion on the Left/and/Right chest.    A /moderate/ anechoic space was seen on the Left/ consistent with an uncomplicated pleural effusion. DESCRIPTION OF PROCEDURE:    After obtaining informed consent and localizing the safest location for thoracentesis, the  9th intercostal space was marked with a blunt, plastic needle cap in the mid scapular line. An [employer] AK-0100 Pleral-Seal thoracentesis kit was used to perform the procedure. The skin was  cleansed with the supplied  chlorhexididne swab and then draped in the usual fasion. Using the previously marked location as a giude, a 22 G 1.5 inch needle was used to inject 10 cc of 1% lidocaine into the skin and subcutaneous tissue, as well as onto the underlying rib and inter-costal muscles, pleural fluid was aspirated to assure proper location, prior to removing the anesthesia needle. A 3mm  incision was then made, with the supplied scalpel in the usual fashion to facilitate the insertiopn of the thoracentesis needle. The needle with an 8French thoracentesis catheter was then introduced into the chest through the previously made incision in the usual fashion, the rib localized with the needle, and the catheter then marched over the rib into the pleural space. After aspirating fluid, the thoracentesis catheter was then placed into the chest using the needle itself as a trocar. The needle was then removed and the catheter was attached to the supplied tubing without complication.     375 cc of /Yellow/ fluid, was aspirated and sent for analysis. Fluid was sent for the following tests:    Cell count with differential  LDH  Glucose  Total protein  Cytology    ADA  AFB  Fungus  Routine culture and Gram stain      Post procedure US confirmed complete/incomplete drainage of the effusion.     EBL:     minimal      COMPLICATIONS:    none    Jeremy Dominique MD

## 2021-03-31 ENCOUNTER — APPOINTMENT (OUTPATIENT)
Dept: GENERAL RADIOLOGY | Age: 76
DRG: 191 | End: 2021-03-31
Attending: INTERNAL MEDICINE
Payer: MEDICARE

## 2021-03-31 PROBLEM — J90 PLEURAL EFFUSION, BILATERAL: Status: ACTIVE | Noted: 2021-03-31

## 2021-03-31 PROBLEM — D72.829 LEUKOCYTOSIS: Status: ACTIVE | Noted: 2021-03-31

## 2021-03-31 PROBLEM — I48.91 ATRIAL FIBRILLATION (HCC): Chronic | Status: ACTIVE | Noted: 2021-03-29

## 2021-03-31 LAB
ANION GAP SERPL CALC-SCNC: 4 MMOL/L (ref 7–16)
BNP SERPL-MCNC: 782 PG/ML (ref 5–125)
BUN SERPL-MCNC: 14 MG/DL (ref 8–23)
CALCIUM SERPL-MCNC: 8.3 MG/DL (ref 8.3–10.4)
CHLORIDE SERPL-SCNC: 106 MMOL/L (ref 98–107)
CO2 SERPL-SCNC: 28 MMOL/L (ref 21–32)
CREAT SERPL-MCNC: 0.71 MG/DL (ref 0.6–1)
ERYTHROCYTE [DISTWIDTH] IN BLOOD BY AUTOMATED COUNT: 14.1 % (ref 11.9–14.6)
GLUCOSE SERPL-MCNC: 98 MG/DL (ref 65–100)
HCT VFR BLD AUTO: 32.1 % (ref 35.8–46.3)
HGB BLD-MCNC: 10.1 G/DL (ref 11.7–15.4)
MAGNESIUM SERPL-MCNC: 2.4 MG/DL (ref 1.8–2.4)
MCH RBC QN AUTO: 28.4 PG (ref 26.1–32.9)
MCHC RBC AUTO-ENTMCNC: 31.5 G/DL (ref 31.4–35)
MCV RBC AUTO: 90.2 FL (ref 79.6–97.8)
NRBC # BLD: 0 K/UL (ref 0–0.2)
PLATELET # BLD AUTO: 643 K/UL (ref 150–450)
PMV BLD AUTO: 9.5 FL (ref 9.4–12.3)
POTASSIUM SERPL-SCNC: 3.8 MMOL/L (ref 3.5–5.1)
PROCALCITONIN SERPL-MCNC: 0.31 NG/ML
RBC # BLD AUTO: 3.56 M/UL (ref 4.05–5.2)
SODIUM SERPL-SCNC: 138 MMOL/L (ref 136–145)
WBC # BLD AUTO: 21.4 K/UL (ref 4.3–11.1)

## 2021-03-31 PROCEDURE — 99232 SBSQ HOSP IP/OBS MODERATE 35: CPT | Performed by: INTERNAL MEDICINE

## 2021-03-31 PROCEDURE — 80048 BASIC METABOLIC PNL TOTAL CA: CPT

## 2021-03-31 PROCEDURE — 83735 ASSAY OF MAGNESIUM: CPT

## 2021-03-31 PROCEDURE — 36415 COLL VENOUS BLD VENIPUNCTURE: CPT

## 2021-03-31 PROCEDURE — 83880 ASSAY OF NATRIURETIC PEPTIDE: CPT

## 2021-03-31 PROCEDURE — 74011000258 HC RX REV CODE- 258: Performed by: HOSPITALIST

## 2021-03-31 PROCEDURE — 71046 X-RAY EXAM CHEST 2 VIEWS: CPT

## 2021-03-31 PROCEDURE — 97530 THERAPEUTIC ACTIVITIES: CPT

## 2021-03-31 PROCEDURE — 65270000029 HC RM PRIVATE

## 2021-03-31 PROCEDURE — 94640 AIRWAY INHALATION TREATMENT: CPT

## 2021-03-31 PROCEDURE — 74011250637 HC RX REV CODE- 250/637: Performed by: HOSPITALIST

## 2021-03-31 PROCEDURE — 2709999900 HC NON-CHARGEABLE SUPPLY

## 2021-03-31 PROCEDURE — 74011250636 HC RX REV CODE- 250/636: Performed by: HOSPITALIST

## 2021-03-31 PROCEDURE — 74011636637 HC RX REV CODE- 636/637: Performed by: HOSPITALIST

## 2021-03-31 PROCEDURE — 94760 N-INVAS EAR/PLS OXIMETRY 1: CPT

## 2021-03-31 PROCEDURE — 74011250637 HC RX REV CODE- 250/637: Performed by: NURSE PRACTITIONER

## 2021-03-31 PROCEDURE — 74011000250 HC RX REV CODE- 250: Performed by: HOSPITALIST

## 2021-03-31 PROCEDURE — 84145 PROCALCITONIN (PCT): CPT

## 2021-03-31 PROCEDURE — 74011250637 HC RX REV CODE- 250/637: Performed by: INTERNAL MEDICINE

## 2021-03-31 PROCEDURE — 85027 COMPLETE CBC AUTOMATED: CPT

## 2021-03-31 RX ORDER — HYDROCODONE BITARTRATE AND HOMATROPINE METHYLBROMIDE 1.5; 5 MG/5ML; MG/5ML
5 SYRUP ORAL
Status: DISCONTINUED | OUTPATIENT
Start: 2021-03-31 | End: 2021-04-01 | Stop reason: HOSPADM

## 2021-03-31 RX ADMIN — GUAIFENESIN AND DEXTROMETHORPHAN 5 ML: 100; 10 SYRUP ORAL at 06:21

## 2021-03-31 RX ADMIN — LEVALBUTEROL HYDROCHLORIDE 1.25 MG: 1.25 SOLUTION RESPIRATORY (INHALATION) at 08:46

## 2021-03-31 RX ADMIN — CEFEPIME HYDROCHLORIDE 2 G: 2 INJECTION, POWDER, FOR SOLUTION INTRAVENOUS at 13:51

## 2021-03-31 RX ADMIN — LEVALBUTEROL HYDROCHLORIDE 1.25 MG: 1.25 SOLUTION RESPIRATORY (INHALATION) at 11:16

## 2021-03-31 RX ADMIN — VENLAFAXINE 75 MG: 75 TABLET ORAL at 08:38

## 2021-03-31 RX ADMIN — Medication 10 ML: at 13:51

## 2021-03-31 RX ADMIN — BUDESONIDE 500 MCG: 0.5 INHALANT RESPIRATORY (INHALATION) at 19:21

## 2021-03-31 RX ADMIN — LEVALBUTEROL HYDROCHLORIDE 1.25 MG: 1.25 SOLUTION RESPIRATORY (INHALATION) at 19:21

## 2021-03-31 RX ADMIN — FAMOTIDINE 40 MG: 20 TABLET, FILM COATED ORAL at 08:37

## 2021-03-31 RX ADMIN — BUDESONIDE 500 MCG: 0.5 INHALANT RESPIRATORY (INHALATION) at 08:46

## 2021-03-31 RX ADMIN — CEFEPIME HYDROCHLORIDE 2 G: 2 INJECTION, POWDER, FOR SOLUTION INTRAVENOUS at 02:14

## 2021-03-31 RX ADMIN — GUAIFENESIN 1200 MG: 600 TABLET ORAL at 08:38

## 2021-03-31 RX ADMIN — Medication 10 ML: at 21:19

## 2021-03-31 RX ADMIN — METOPROLOL SUCCINATE 50 MG: 50 TABLET, EXTENDED RELEASE ORAL at 08:38

## 2021-03-31 RX ADMIN — FERROUS SULFATE TAB 325 MG (65 MG ELEMENTAL FE) 325 MG: 325 (65 FE) TAB at 08:37

## 2021-03-31 RX ADMIN — VANCOMYCIN HYDROCHLORIDE 1000 MG: 1 INJECTION, POWDER, LYOPHILIZED, FOR SOLUTION INTRAVENOUS at 04:45

## 2021-03-31 RX ADMIN — Medication 5 ML: at 06:21

## 2021-03-31 RX ADMIN — LEVALBUTEROL HYDROCHLORIDE 1.25 MG: 1.25 SOLUTION RESPIRATORY (INHALATION) at 15:06

## 2021-03-31 RX ADMIN — LEVOTHYROXINE SODIUM 75 MCG: 0.07 TABLET ORAL at 08:38

## 2021-03-31 RX ADMIN — FERROUS SULFATE TAB 325 MG (65 MG ELEMENTAL FE) 325 MG: 325 (65 FE) TAB at 17:00

## 2021-03-31 RX ADMIN — PREDNISONE 60 MG: 20 TABLET ORAL at 08:37

## 2021-03-31 NOTE — PROGRESS NOTES
Hospitalist Progress Note     Admit Date:  3/29/2021  8:45 AM   Name:  Sheryle Dearth   Age:  70 y.o.  :  1945   MRN:  [id number]   PCP:  Kary Grant MD  Treatment Team: Attending Provider: Nic Al MD; Consulting Provider: Satnam Mathews MD; Utilization Review: Gaudencio Vega RN; Care Manager: Rosella Kanner, RN; Staff Nurse: Sadi Kinney RN; Physical Therapy Assistant: Waldemar Gillis PTA  Presenting Complaint: Cough    Hospital Course:   Mrs. Aman Garcia is a very n ice 71 y/o WF with a h/o AFib/SSS s/p recent PPM placement, bronchiectasis, hypothyroidism (following thyroidectomy 2/2 Graves dz), prior infections with MAC and aspergillus who presented to the ER on 3/29 with SOB. Had recent visit to Urgent Care with similar symptoms and completed 1 week doxycycline. CXR showed bilateral effusions, CT chest showed the same. Pulmonology was consulted. She underwent left sided thoracentesis on 3/30 with removal of around 400cc fluid. She is on antibiotics. Remains on RA. Cardiology saw for small pericardial effusion, no evidence of tamponade. 24hr Events/Subjective:   3/31: In bed, on RA, breathing has improved.  at bedside. She feels much better. Robitussin changed today as was not helping her cough. No chest pain or pleurisy. No edema.     No other complaints  Assessment and Plan:     Hospital Problems as of 3/31/2021 Date Reviewed: 2020          Codes Class Noted - Resolved POA    Leukocytosis ICD-10-CM: D72.829  ICD-9-CM: 288.60  3/31/2021 - Present Unknown        Pleural effusion, bilateral ICD-10-CM: J90  ICD-9-CM: 511.9  3/31/2021 - Present Unknown        Thrombocytosis ([facility] [address] ICD-10-CM: D47.3  ICD-9-CM: 238.71  3/30/2021 - Present Yes        Cough ICD-10-CM: R05  ICD-9-CM: 786.2  3/29/2021 - Present Yes        * (Principal) Parapneumonic effusion ICD-10-CM: J18.9, J91.8  ICD-9-CM: 511.89  3/29/2021 - Present Yes        Dyspnea ICD-10-CM: R06.00  ICD-9-CM: 786.09  3/29/2021 - Present Yes        Community acquired pneumonia ICD-10-CM: J18.9  ICD-9-CM: 716  3/29/2021 - Present Yes        Atrial fibrillation ([facility] (Chronic) ICD-10-CM: I48.91  ICD-9-CM: 427.31  3/29/2021 - Present Yes        Acquired hypothyroidism (Chronic) ICD-10-CM: E03.9  ICD-9-CM: 244.9  5/10/2016 - Present Yes        Bronchiectasis ([facility] (Chronic) ICD-10-CM: J47.9  ICD-9-CM: 494.0  5/10/2016 - Present Yes        GERD (gastroesophageal reflux disease) (Chronic) ICD-10-CM: K21.9  ICD-9-CM: 530.81  9/12/2013 - Present Yes              Plan:  # Bilateral pleural effusions   - CXR concerning for PNA, CT with bb atelectasis and effusions. S/p left sided thora 3/30, 400cc out. Stop vanc today, cultures negative. On cefepime, nebs. Pleural fluid exudative. WBCs up but on prednisone. Appreciate Pulm help. RA O2. # HypoK   - Resolved. # GERD   - Pepcid    # Hypothyroidism 2/2 thyroidectomy for Graves   - Synthroid    # Afib/SSS   - S/p recent PPM.    Other listed chronic conditions stable, continue current management. DC planning: Home.   Diet:  DIET CARDIAC  DVT ppx: Ambulation    Objective:     Patient Vitals for the past 24 hrs:   Temp Pulse Resp BP SpO2   03/31/21 0848     92 %   03/31/21 0705 98 °F (36.7 °C) 91 18 118/61 93 %   03/31/21 0352 97.9 °F (36.6 °C) 79 16 116/63 93 %   03/30/21 2328 98 °F (36.7 °C) 97 16 128/67 91 %   03/30/21 2003 98.1 °F (36.7 °C) 96 16 125/62 95 %   03/30/21 1913     96 %   03/30/21 1633 98.1 °F (36.7 °C) 92 16 (!) 126/53 95 %   03/30/21 1405  (!) 106 16 (!) 140/60 98 %   03/30/21 1355  (!) 110 17 138/64 99 %   03/30/21 1328     97 %   03/30/21 1211 98.2 °F (36.8 °C) 85 19 (!) 122/55 93 %     Oxygen Therapy  O2 Sat (%): 92 % (03/31/21 0848)  Pulse via Oximetry: 83 beats per minute (03/31/21 0848)  O2 Device: Room air (03/31/21 0840)    Estimated body mass index is 23.38 kg/m² as calculated from the following:    Height as of this encounter: 5' 3\" (1.6 m). Weight as of this encounter: 59.9 kg (132 lb). Intake/Output Summary (Last 24 hours) at 3/31/2021 1004  Last data filed at 3/31/2021 0908  Gross per 24 hour   Intake 1610 ml   Output 2525 ml   Net -915 ml       *Note that automatically entered I/Os may not be accurate; dependent on patient compliance with collection and accurate  by techs. General:    Well nourished, thin. No overt distress  CV:   RRR. No m/r/g. No edema. No JVD  Lungs:   Decreased at the bases but clear otherwise. Currently on RA, NAD. Abdomen:   Soft, nontender, nondistended. Extremities: Warm and dry. No cyanosis   Skin:     No rashes. Normal coloration  Neuro:  No gross focal deficits.   Alert    Data Reviewed:  I have reviewed all labs, meds, and studies from the last 24 hours:  Recent Results (from the past 24 hour(s))   RESPIRATORY VIRUS PANEL W/COVID-19, PCR    Collection Time: 03/30/21 10:46 AM    Specimen: Nasopharyngeal   Result Value Ref Range    Adenovirus NOT DETECTED NOTDET      Coronavirus 229E NOT DETECTED NOTDET      Coronavirus HKU1 NOT DETECTED NOTDET      Coronavirus CVNL63 NOT DETECTED NOTDET      Coronavirus OC43 NOT DETECTED NOTDET      Metapneumovirus NOT DETECTED NOTDET      Rhinovirus and Enterovirus NOT DETECTED NOTDET      Influenza A NOT DETECTED NOTDET      Influenza B NOT DETECTED NOTDET      Parainfluenza 1 NOT DETECTED NOTDET      Parainfluenza 2 NOT DETECTED NOTDET      Parainfluenza 3 NOT DETECTED NOTDET      Parainfluenza virus 4 NOT DETECTED NOTDET      RSV by PCR NOT DETECTED NOTDET      B. parapertussis, PCR NOT DETECTED NOTDET      Bordetella pertussis - PCR NOT DETECTED NOTDET      Chlamydophila pneumoniae DNA, QL, PCR NOT DETECTED NOTDET      Mycoplasma pneumoniae DNA, QL, PCR NOT DETECTED NOTDET      SARS-CoV-2, PCR NOT DETECTED NOTDET     POTASSIUM    Collection Time: 03/30/21  1:49 PM   Result Value Ref Range    Potassium 3.7 3.5 - 5.1 mmol/L   CELL COUNT, BODY FLUID    Collection Time: 03/30/21  2:10 PM   Result Value Ref Range    BODY FLUID TYPE LEFT      FLUID COLOR HAZY      FLUID APPEARANCE ORANGE      FLUID RBC CT. 14,000 /cu mm    FLUID WBC COUNT 2,753 /cu mm    BRCH NEUTROPHIL 62 %    BRCH LYMPHS 18 %    BRCH MACROPHAGES 20 %   GLUCOSE, FLUID    Collection Time: 03/30/21  2:10 PM   Result Value Ref Range    Fluid Type: LEFT      Glucose, body fld. 142 MG/DL   LDH, BODY FLUID    Collection Time: 03/30/21  2:10 PM   Result Value Ref Range    Fluid Type: LEFT      LD, body fld. 197 U/L   PROTEIN TOTAL, FLUID    Collection Time: 03/30/21  2:10 PM   Result Value Ref Range    Fluid Type: LEFT      Protein total, body fld. 4.2 g/dL   CULTURE, BODY FLUID W GRAM STAIN    Collection Time: 03/30/21  2:10 PM    Specimen: Pleural Fluid    LEFT   Result Value Ref Range    Special Requests: NO SPECIAL REQUESTS      GRAM STAIN PENDING     Culture result:        NO GROWTH AFTER SHORT PERIOD OF INCUBATION. FURTHER RESULTS TO FOLLOW AFTER OVERNIGHT INCUBATION.    METABOLIC PANEL, BASIC    Collection Time: 03/31/21  3:48 AM   Result Value Ref Range    Sodium 138 136 - 145 mmol/L    Potassium 3.8 3.5 - 5.1 mmol/L    Chloride 106 98 - 107 mmol/L    CO2 28 21 - 32 mmol/L    Anion gap 4 (L) 7 - 16 mmol/L    Glucose 98 65 - 100 mg/dL    BUN 14 8 - 23 MG/DL    Creatinine 0.71 0.6 - 1.0 MG/DL    GFR est AA >60 >60 ml/min/1.73m2    GFR est non-AA >60 >60 ml/min/1.73m2    Calcium 8.3 8.3 - 10.4 MG/DL   MAGNESIUM    Collection Time: 03/31/21  3:48 AM   Result Value Ref Range    Magnesium 2.4 1.8 - 2.4 mg/dL   CBC W/O DIFF    Collection Time: 03/31/21  3:48 AM   Result Value Ref Range    WBC 21.4 (H) 4.3 - 11.1 K/uL    RBC 3.56 (L) 4.05 - 5.2 M/uL    HGB 10.1 (L) 11.7 - 15.4 g/dL    HCT 32.1 (L) 35.8 - 46.3 %    MCV 90.2 79.6 - 97.8 FL    MCH 28.4 26.1 - 32.9 PG    MCHC 31.5 31.4 - 35.0 g/dL    RDW 14.1 11.9 - 14.6 %    PLATELET 611 (H) 761 - 450 K/uL    MPV 9.5 9.4 - 12.3 FL ABSOLUTE NRBC 0.00 0.0 - 0.2 K/uL   PROCALCITONIN    Collection Time: 03/31/21  3:48 AM   Result Value Ref Range    Procalcitonin 0.31 ng/mL   NT-PRO BNP    Collection Time: 03/31/21  3:48 AM   Result Value Ref Range    NT pro- (H) 5 - 125 PG/ML       Current Meds:  Current Facility-Administered Medications   Medication Dose Route Frequency    HYDROcodone-homatropine (HYCODAN) 5-1.5 mg/5 mL (5 mL) oral solution 5 mL  5 mL Oral Q4H PRN    famotidine (PEPCID) tablet 40 mg  40 mg Oral DAILY    ferrous sulfate tablet 325 mg  1 Tab Oral BID WITH MEALS    guaiFENesin ER (MUCINEX) tablet 1,200 mg  1,200 mg Oral DAILY    venlafaxine (EFFEXOR) tablet 75 mg  75 mg Oral DAILY    metoprolol succinate (TOPROL-XL) XL tablet 50 mg  50 mg Oral DAILY    levothyroxine (SYNTHROID) tablet 75 mcg  75 mcg Oral DAILY    sodium chloride (NS) flush 5-40 mL  5-40 mL IntraVENous Q8H    sodium chloride (NS) flush 5-40 mL  5-40 mL IntraVENous PRN    acetaminophen (TYLENOL) tablet 650 mg  650 mg Oral Q6H PRN    Or    acetaminophen (TYLENOL) suppository 650 mg  650 mg Rectal Q6H PRN    polyethylene glycol (MIRALAX) packet 17 g  17 g Oral DAILY PRN    promethazine (PHENERGAN) tablet 12.5 mg  12.5 mg Oral Q6H PRN    Or    ondansetron (ZOFRAN) injection 4 mg  4 mg IntraVENous Q6H PRN    potassium chloride 10 mEq in 100 ml IVPB  10 mEq IntraVENous PRN    magnesium sulfate 2 g/50 ml IVPB (premix or compounded)  2 g IntraVENous PRN    levalbuterol (XOPENEX) nebulizer soln 1.25 mg/3 mL  1.25 mg Nebulization Q6H RT    budesonide (PULMICORT) 500 mcg/2 ml nebulizer suspension  500 mcg Nebulization BID RT    cefepime (MAXIPIME) 2 g in 0.9% sodium chloride (MBP/ADV) 100 mL MBP  2 g IntraVENous Q12H    predniSONE (DELTASONE) tablet 60 mg  60 mg Oral DAILY WITH BREAKFAST       Other Studies:  Results for orders placed or performed during the hospital encounter of 03/29/21   2D ECHO COMPLETE ADULT (TTE) W OR WO CONTR    Narrative [address] [facility], [address]  [phone]    Transthoracic Echocardiogram  2D, M-mode, Doppler, and Color Doppler    Patient: Yasmeen Helm  MR #: [id number]  : 1945  Age: 70 years  Gender: Female  Study date: 29-Mar-2021  Account #: [de-identified]  Height: 63 in  Weight: 131.8 lb  BSA: 1.62 mï¾²  Status:Routine  Location: ER12  BP: 126/ 78    Allergies: DULOXETINE, AMOXICILLIN-POT CLAVULANATE, SULFA (SULFONAMIDE  ANTIBIOTICS), SULFUR    Sonographer:  Sami Alas [facility]  Group:  [facility]  Referring Physician:  Yinka Watkins MD  Reading Physician:  Sumner Buerger, MD    PROCEDURE: This was a routine study. A transthoracic echocardiogram was  performed. The study included complete 2D imaging, M-mode, complete spectral  Doppler, and color Doppler. Image quality was adequate. LEFT VENTRICLE: Normal diastolic function with an average E/e' of 12.9 Size   was  normal. Systolic function was normal. Ejection fraction was estimated in the  range of 55 % to 60 %. There were no regional wall motion abnormalities. Wall  thickness was normal.    RIGHT VENTRICLE: The size was normal. Systolic function was normal. The  tricuspid jet envelope definition was inadequate for estimation of RV   systolic  pressure. LEFT ATRIUM: Size was normal.    RIGHT ATRIUM: Size was normal.    SYSTEMIC VEINS: IVC: The inferior vena cava was normal in size and course. AORTIC VALVE: The valve was structurally normal, tri-commissural. There was   no  evidence for stenosis. There was no insufficiency. MITRAL VALVE: Valve structure was normal. There was no evidence for stenosis. There was no regurgitation. TRICUSPID VALVE: The valve structure was normal. There was no evidence for  stenosis. There was no regurgitation. PULMONIC VALVE: The valve structure was normal. There was no evidence for  stenosis. There was trivial regurgitation.     PERICARDIUM: A small pericardial effusion was identified. There was no   evidence  of hemodynamic compromise. AORTA: The root exhibited normal size. SUMMARY:    -  Left ventricle: Normal diastolic function with an average E/e' of 70.4  Systolic function was normal. Ejection fraction was estimated in the range of  55 % to 60 %. There were no regional wall motion abnormalities. -  Pericardium: A small pericardial effusion was identified. There was no  evidence of hemodynamic compromise. SYSTEM MEASUREMENT TABLES    2D mode  AoR Diam (2D): 3.2 cm  LA Dimension (2D): 1.9 cm  Left Atrium Systolic Volume Index; Method of Disks, Biplane; 2D mode;: 16.4  ml/m2  IVS/LVPW (2D): 0.8  IVSd (2D): 0.8 cm  LVIDd (2D): 3.9 cm  LVIDs (2D): 2.9 cm  LVPWd (2D): 1 cm  RVIDd (2D): 2.3 cm    Unspecified Scan Mode  Peak Grad; Mean; Antegrade Flow: 5 mm[Hg]  Vmax; Antegrade Flow: 117 cm/s    Prepared and signed by    Nikko Moura MD  Signed 29-Mar-2021 16:08:18         Xr Chest Pa Lat    Result Date: 3/31/2021  Chest X-ray INDICATION: Follow-up effusions PA and lateral views of the chest were obtained. FINDINGS: There are persistent but smaller bilateral pleural effusions. There is no significant infiltrate. The heart size is normal.  Pacemaker is present.      Persistent but smaller bilateral effusions       All Micro Results     Procedure Component Value Units Date/Time    CULTURE, RESPIRATORY/SPUTUM/BRONCH Rebecca Padilla [[id number]] Collected: 03/30/21 0141    Order Status: Completed Specimen: Sputum Updated: 03/31/21 0935     Special Requests: NO SPECIAL REQUESTS        GRAM STAIN 5 TO 40 WBC'S/OIF      0 TO 2 EPITHELIAL CELLS SEEN /OIF      FEW GRAM POSITIVE COCCI         FEW GRAM NEGATIVE RODS         FEW GRAM NEGATIVE COCCI         3+ MUCUS PRESENT        Culture result:       MODERATE NORMAL RESPIRATORY NIMA          CULTURE, BODY FLUID JERRY Nancy Aiyana [[id number]] Collected: 03/30/21 1410    Order Status: Completed Specimen: Pleural Fluid Updated: 03/31/21 0812     Special Requests: NO SPECIAL REQUESTS        GRAM STAIN PENDING     Culture result:       NO GROWTH AFTER SHORT PERIOD OF INCUBATION. FURTHER RESULTS TO FOLLOW AFTER OVERNIGHT INCUBATION. AFB CULTURE + SMEAR W/RFLX ID FROM CULTURE [[id number]] Collected: 03/30/21 1410    Order Status: Completed Updated: 03/30/21 1725    FUNGUS CULTURE AND SMEAR [[id number]] Collected: 03/30/21 1410    Order Status: Completed Updated: 03/30/21 1724    RESPIRATORY VIRUS PANEL W/COVID-19, PCR [[id number]] Collected: 03/30/21 1046    Order Status: Completed Specimen: Nasopharyngeal Updated: 03/30/21 1243     Adenovirus NOT DETECTED        Coronavirus 229E NOT DETECTED        Coronavirus HKU1 NOT DETECTED        Coronavirus CVNL63 NOT DETECTED        Coronavirus OC43 NOT DETECTED        Metapneumovirus NOT DETECTED        Rhinovirus and Enterovirus NOT DETECTED        Influenza A NOT DETECTED        Influenza B NOT DETECTED        Parainfluenza 1 NOT DETECTED        Parainfluenza 2 NOT DETECTED        Parainfluenza 3 NOT DETECTED        Parainfluenza virus 4 NOT DETECTED        RSV by PCR NOT DETECTED        B. parapertussis, PCR NOT DETECTED        Bordetella pertussis - PCR NOT DETECTED        Chlamydophila pneumoniae DNA, QL, PCR NOT DETECTED        Mycoplasma pneumoniae DNA, QL, PCR NOT DETECTED        SARS-CoV-2, PCR NOT DETECTED        Comment: This test has been authorized by the FDA under an Emergency Use Authorization (EUA) for use by authorized laboratories.         CULTURE, BLOOD [[id number]] Collected: 03/29/21 0920    Order Status: Completed Specimen: Blood Updated: 03/30/21 0948     Special Requests: --        LEFT  FOREARM       Culture result: NO GROWTH 1 DAY       CULTURE, BLOOD [[id number]] Collected: 03/29/21 0920    Order Status: Completed Specimen: Blood Updated: 03/30/21 0948     Special Requests: --        RIGHT  Antecubital       Culture result: NO GROWTH 1 DAY       LEGIONELLA Joey White [[id number]] Collected: 03/30/21 0140    Order Status: Completed Specimen: Urine Updated: 03/30/21 0225          SARS-CoV-2 Lab Results  \"Novel Coronavirus\" Test: No results found for: COV2NT   \"Emergent Disease\" Test: No results found for: EDPR  \"SARS-COV-2\" Test: No results found for: XGCOVT  Rapid Test: No results found for: COVR         Signed:  Alfredito Larios MD

## 2021-03-31 NOTE — PROGRESS NOTES
ACUTE PHYSICAL THERAPY GOALS:  (Developed with and agreed upon by patient and/or caregiver. )  LTG:  (1.)Ms. Imelda Connor will move from supine to sit and sit to supine  with INDEPENDENCE within 7 treatment day(s). MET 3/31/21  (2.)Ms. Imelda Connor will transfer from bed to chair and chair to bed with INDEPENDENT using the least restrictive device within 7 treatment day(s). MET 3/31/21  (3.)Ms. Imelda Connor will ambulate with INDEPENDENCE for 500 feet with the least restrictive device within 7 treatment day(s). MET 3/31/21  (4.)Ms. Imelda Connor will perform standing static and dynamic balance activities x 20 minutes with INDEPENDENT to improve safety within 7 treatment day(s). MET 3/31/21  (5.)Ms. Imelda Connor will maintain stable vital signs throughout all functional mobility within 7 treatment days. MET 3/31/21    PHYSICAL THERAPY: Daily Note Treatment Day # 2    Laquita Chen is a 70 y.o. female   PRIMARY DIAGNOSIS: Parapneumonic effusion  Community acquired pneumonia [J18.9]  Parapneumonic effusion [J18.9, J91.8]  Cough [R05]  Dyspnea [R06.00]  Procedure(s) (LRB):  ULTRASOUND (Bilateral)  THORACENTESIS (Left)  1 Day Post-Op    ASSESSMENT:     REHAB RECOMMENDATIONS: CURRENT LEVEL OF FUNCTION:  (Most Recently Demonstrated)   Recommendation to date pending progress:  Setting:   No further skilled therapy   Equipment:    None Bed Mobility:   Independent  Sit to Stand:   Independent  Transfers:   Independent  Gait/Mobility:   Independent     ASSESSMENT:  Ms. Imelda Connor was supine in bed on arrival. She is agreeable to PT and is motivated. She greatly increased her gait distance. She met all goals today. Will discharge her from PT. Pt is doing well and encouraged her to walk during the day. SUBJECTIVE:   Ms. Imelda Connor states, \"I feel much better. \"    SOCIAL HISTORY/ LIVING ENVIRONMENT:  Lives with spouse in 2 story home with bedroom on 1st floor.    Independent with ambulation and ADLs previously  No DME     OBJECTIVE:     PAIN: VITAL SIGNS: LINES/DRAINS:   Pre Treatment:   none   Post Treatment: none   none  O2 Device: Nasal cannula     MOBILITY: I Mod I S SBA CGA Min Mod Max Total  NT x2 Comments:   Bed Mobility    Rolling [] [] [] [] [] [] [] [] [] [] []    Supine to Sit [x] [] [] [] [] [] [] [] [] [] []    Scooting [x] [] [] [] [] [] [] [] [] [] []    Sit to Supine [] [] [] [] [] [] [] [] [] [] []    Transfers    Sit to Stand [x] [] [] [] [] [] [] [] [] [] []    Bed to Chair [x] [] [] [] [] [] [] [] [] [] []    Stand to Sit [x] [] [] [] [] [] [] [] [] [] []    I=Independent, Mod I=Modified Independent, S=Supervision, SBA=Standby Assistance, CGA=Contact Guard Assistance,   Min=Minimal Assistance, Mod=Moderate Assistance, Max=Maximal Assistance, Total=Total Assistance, NT=Not Tested    BALANCE: Good Fair+ Fair Fair- Poor NT Comments   Sitting Static [x] [] [] [] [] []    Sitting Dynamic [x] [] [] [] [] []              Standing Static [x] [] [] [] [] []    Standing Dynamic [x] [] [] [] [] []      GAIT: I Mod I S SBA CGA Min Mod Max Total  NT x2 Comments:   Level of Assistance [x] [] [] [] [] [] [] [] [] [] []    Distance 1250'    DME None    Gait Quality Slow, steady gait    Weightbearing  Status N/A     I=Independent, Mod I=Modified Independent, S=Supervision, SBA=Standby Assistance, CGA=Contact Guard Assistance,   Min=Minimal Assistance, Mod=Moderate Assistance, Max=Maximal Assistance, Total=Total Assistance, NT=Not Tested    PLAN:   FREQUENCY/DURATION:  Discharge from PT due to all goals met. TREATMENT:     TREATMENT:   ($$ Therapeutic Activity: 23-37 mins    )  Therapeutic Activity (30 Minutes): Therapeutic activity included Supine to Sit, Scooting, Ambulation on level ground, Sitting balance  and Standing balance to improve functional Mobility, Strength and Activity tolerance.     TREATMENT GRID:  N/A    AFTER TREATMENT POSITION/PRECAUTIONS:  Chair and Needs within reach    INTERDISCIPLINARY COLLABORATION:  RN/PCT    TOTAL TREATMENT DURATION:  PT Patient Time In/Time Out  Time In: 1130  Time Out: UZMA Light

## 2021-03-31 NOTE — PROGRESS NOTES
END OF SHIFT NOTE:    INTAKE/OUTPUT  03/30 0701 - 03/31 0700  In: 2242 [P.O.:840; I.V.:350]  Out: 2525 [Urine:2150]  Voiding: YES  Catheter: NO    Flatus: Patient does have flatus present. VITAL SIGNS  Patient Vitals for the past 12 hrs:   Temp Pulse Resp BP SpO2   03/31/21 0352 97.9 °F (36.6 °C) 79 16 116/63 93 %   03/30/21 2328 98 °F (36.7 °C) 97 16 128/67 91 %   03/30/21 2003 98.1 °F (36.7 °C) 96 16 125/62 95 %       Pain Assessment  Pain Intensity 1: 0 (03/31/21 0215)  Pain Location 1: Chest(due to cough)     Patient Stated Pain Goal: 0    Ambulating  Yes    Shift report given to oncoming nurse at the bedside.     Wu Hays RN

## 2021-03-31 NOTE — PROGRESS NOTES
Marcia Nieto  Admission Date: 3/29/2021             Daily Progress Note: 3/31/2021    The patient's chart is reviewed and the patient is discussed with the staff. 72-year-old  female past medical history of recent pacemaker placemen (Jan) t for A. fib and sick sinus syndrome. She has a history of bronchiectasis and reports history of WILLAM and Aspergillus. She has had an unproductive cough for several months now and also chest discomfort across her upper chest associated with deep breathing and cough. CT showed bilateral pleural effusions (L > R) and a small pericardial effusion. Echo shows a normal EF and a small effusion as well. BNP was initially 590 and is up to 782. She was given lasix here. CRP is 241. She has been started on steroids and a left thoracentesis was done on 3/30 removing 375 mls of exudative fluid. RSV is neg, recent neg COVID, legionella pending. She is on Maxipeme. Subjective:     Chest pain has resolved since thoracentesis and she feels \"lighter\" but the cough persists and Robitussin is not helpful. She is on her way to xray.      Current Facility-Administered Medications   Medication Dose Route Frequency    famotidine (PEPCID) tablet 40 mg  40 mg Oral DAILY    ferrous sulfate tablet 325 mg  1 Tab Oral BID WITH MEALS    guaiFENesin ER (MUCINEX) tablet 1,200 mg  1,200 mg Oral DAILY    venlafaxine (EFFEXOR) tablet 75 mg  75 mg Oral DAILY    metoprolol succinate (TOPROL-XL) XL tablet 50 mg  50 mg Oral DAILY    levothyroxine (SYNTHROID) tablet 75 mcg  75 mcg Oral DAILY    sodium chloride (NS) flush 5-40 mL  5-40 mL IntraVENous Q8H    sodium chloride (NS) flush 5-40 mL  5-40 mL IntraVENous PRN    acetaminophen (TYLENOL) tablet 650 mg  650 mg Oral Q6H PRN    Or    acetaminophen (TYLENOL) suppository 650 mg  650 mg Rectal Q6H PRN    polyethylene glycol (MIRALAX) packet 17 g  17 g Oral DAILY PRN    promethazine (PHENERGAN) tablet 12.5 mg  12.5 mg Oral Q6H PRN    Or    ondansetron (ZOFRAN) injection 4 mg  4 mg IntraVENous Q6H PRN    potassium chloride 10 mEq in 100 ml IVPB  10 mEq IntraVENous PRN    magnesium sulfate 2 g/50 ml IVPB (premix or compounded)  2 g IntraVENous PRN    levalbuterol (XOPENEX) nebulizer soln 1.25 mg/3 mL  1.25 mg Nebulization Q6H RT    budesonide (PULMICORT) 500 mcg/2 ml nebulizer suspension  500 mcg Nebulization BID RT    cefepime (MAXIPIME) 2 g in 0.9% sodium chloride (MBP/ADV) 100 mL MBP  2 g IntraVENous Q12H    predniSONE (DELTASONE) tablet 60 mg  60 mg Oral DAILY WITH BREAKFAST         Objective:     Vitals:    03/30/21 2003 03/30/21 2328 03/31/21 0352 03/31/21 0705   BP: 125/62 128/67 116/63 118/61   Pulse: 96 97 79 91   Resp: 16 16 16 18   Temp: 98.1 °F (36.7 °C) 98 °F (36.7 °C) 97.9 °F (36.6 °C) 98 °F (36.7 °C)   SpO2: 95% 91% 93% 93%   Weight:       Height:         Intake and Output:   03/29 1901 - 03/31 0700  In: 4752 [P.O.:840; I.V.:350]  Out: 3650 [Urine:3275]  No intake/output data recorded. Physical Exam:   Constitutional:  the patient is well developed and in no acute distress  HEENT:  Sclera clear, pupils equal, oral mucosa moist  Lungs: crackles from anterior - not as much from posterior. Coughing with exam. On room air  Cardiovascular:  RRR without M,G,R  Abd/GI: soft and non-tender; with positive bowel sounds. Ext: warm without cyanosis. There is no lower leg edema. Musculoskeletal: moves all four extremities with equal strength  Skin:  no jaundice or rashes, no wounds   Neuro: no gross neuro deficits   Musculoskeletal: can ambulate. No deformity  Psychiatric: Calm.      Review of Systems - General ROS: positive for  - fatigue  Respiratory ROS: positive for - cough and pleuritic pain  Cardiovascular ROS: no chest pain or dyspnea on exertion  Gastrointestinal ROS: recent nausea and vomiting thought to be secondary GI virus  Musculoskeletal ROS: positive for - none   Lines:  Peripheral IV    CHEST XRAY: pending    CT       Left ventricle: Normal diastolic function with an average E/e' of 66.2  Systolic function was normal. Ejection fraction was estimated in the range of  55 % to 60 %. There were no regional wall motion abnormalities.   -  Pericardium: A small pericardial effusion was identified. There was no  evidence of hemodynamic compromise    LAB  Recent Labs     03/31/21  0348 03/30/21  0423 03/29/21  0819   WBC 21.4* 24.1* 19.8*   HGB 10.1* 10.4* 12.1   HCT 32.1* 32.5* 38.4   * 608* 606*     Recent Labs     03/31/21  0348 03/30/21  1349 03/30/21  0423 03/29/21  0819     --  138 135*   K 3.8 3.7 3.3* 4.1     --  105 102   CO2 28  --  25 30   GLU 98  --  142* 101*   BUN 14  --  9 10   CREA 0.71  --  0.78 0.79   MG 2.4  --  2.4  --    ALB  --   --   --  2.6*       Assessment:  (Medical Decision Making)     Hospital Problems  Date Reviewed: 11/23/2020          Codes Class Noted POA    Thrombocytosis ([facility] [address] ICD-10-CM: D47.3  ICD-9-CM: 238.71  3/30/2021 Yes    persistent    Cough ICD-10-CM: R05  ICD-9-CM: 786.2  3/29/2021 Yes    Ongoing, nonproductive    * (Principal) Parapneumonic effusion ICD-10-CM: J18.9, J91.8  ICD-9-CM: 511.89  3/29/2021 Yes    Exudative, cultures/cytology pending    Dyspnea ICD-10-CM: R06.00  ICD-9-CM: 786.09  3/29/2021 Yes    She mainly describes cough    Community acquired pneumonia ICD-10-CM: J18.9  ICD-9-CM: 486  3/29/2021 Yes    ? Atrial fibrillation ([facility]) (Chronic) ICD-10-CM: I48.91  ICD-9-CM: 427.31  3/29/2021 Yes    Hx of with SSS    Acquired hypothyroidism (Chronic) ICD-10-CM: E03.9  ICD-9-CM: 244.9  5/10/2016 Yes    On Rx    Bronchiectasis ([address] (Chronic) ICD-10-CM: J47.9  ICD-9-CM: 494.0  5/10/2016 Yes    Cough unproductive    GERD (gastroesophageal reflux disease) (Chronic) ICD-10-CM: K21.9  ICD-9-CM: 530.81  9/12/2013 Yes    On pepcid          Plan:  (Medical Decision Making)   1. Going for CXR now - will review  2.  Left thoracentesis yesterday - 375 mls removed. Fluid exudative. Cultures/cytology pending  3. Will stop Vanc. PCT 0.3. WBC elevated but she is on steroids. Day 3 Maxipeme  4. Prednisone 60 mg per day. . Has pleural and pericardial effusions - ? Inflammatory versus other. ? Need for other studies ie RF, MELCHOR  5. Off lasix - fluid balance neg 2 liters. Echo with normal EF.pleural fluid exudative  6. Will try Hycodan for cough - Robitussin not helpful    Mello Mcdaniel NP   Lungs: clear  Heart:  RRR with no Murmur/Rubs/Gallops    Additional Comments:  cxr  Much improved on the left-po antibx soon      I have spoken with and examined the patient. I agree with the above assessment and plan as documented. Ari Conley MD      More than 50% of time documented was spent face-to-face contact with the patient and in the care of the patient on the floor/unit where the patient is located.

## 2021-03-31 NOTE — PROGRESS NOTES
CM followed up with patient regarding OT recommendation for a BSC. Patient stated \"No absolutely not, I have no ambulatory issues and have been walking back and forth to the bathroom myself\". CM confirmed no need for [facility] and just wanted to follow up with the recommendation. CM will continue to follow patient during hospitalization for discharge planning and needs. Please consult or notify CM of new needs.

## 2021-04-01 VITALS
TEMPERATURE: 97.8 F | DIASTOLIC BLOOD PRESSURE: 59 MMHG | RESPIRATION RATE: 17 BRPM | HEART RATE: 78 BPM | OXYGEN SATURATION: 97 % | BODY MASS INDEX: 23.39 KG/M2 | SYSTOLIC BLOOD PRESSURE: 125 MMHG | WEIGHT: 132 LBS | HEIGHT: 63 IN

## 2021-04-01 LAB
BACTERIA SPEC CULT: ABNORMAL
BACTERIA SPEC CULT: ABNORMAL
ERYTHROCYTE [DISTWIDTH] IN BLOOD BY AUTOMATED COUNT: 14.6 % (ref 11.9–14.6)
GRAM STN SPEC: ABNORMAL
HCT VFR BLD AUTO: 32.9 % (ref 35.8–46.3)
HGB BLD-MCNC: 10.4 G/DL (ref 11.7–15.4)
L PNEUMO1 AG UR QL IA: NEGATIVE
MAGNESIUM SERPL-MCNC: 2.8 MG/DL (ref 1.8–2.4)
MCH RBC QN AUTO: 29.3 PG (ref 26.1–32.9)
MCHC RBC AUTO-ENTMCNC: 31.6 G/DL (ref 31.4–35)
MCV RBC AUTO: 92.7 FL (ref 79.6–97.8)
NRBC # BLD: 0 K/UL (ref 0–0.2)
PLATELET # BLD AUTO: 690 K/UL (ref 150–450)
PMV BLD AUTO: 9.5 FL (ref 9.4–12.3)
RBC # BLD AUTO: 3.55 M/UL (ref 4.05–5.2)
SERVICE CMNT-IMP: ABNORMAL
SPECIMEN SOURCE: NORMAL
WBC # BLD AUTO: 17.7 K/UL (ref 4.3–11.1)

## 2021-04-01 PROCEDURE — 74011000258 HC RX REV CODE- 258: Performed by: HOSPITALIST

## 2021-04-01 PROCEDURE — 74011000250 HC RX REV CODE- 250: Performed by: HOSPITALIST

## 2021-04-01 PROCEDURE — 74011250636 HC RX REV CODE- 250/636: Performed by: HOSPITALIST

## 2021-04-01 PROCEDURE — 74011250637 HC RX REV CODE- 250/637: Performed by: INTERNAL MEDICINE

## 2021-04-01 PROCEDURE — 94640 AIRWAY INHALATION TREATMENT: CPT

## 2021-04-01 PROCEDURE — 74011636637 HC RX REV CODE- 636/637: Performed by: HOSPITALIST

## 2021-04-01 PROCEDURE — 74011250637 HC RX REV CODE- 250/637: Performed by: HOSPITALIST

## 2021-04-01 PROCEDURE — 83735 ASSAY OF MAGNESIUM: CPT

## 2021-04-01 PROCEDURE — 74011250637 HC RX REV CODE- 250/637: Performed by: NURSE PRACTITIONER

## 2021-04-01 PROCEDURE — 94760 N-INVAS EAR/PLS OXIMETRY 1: CPT

## 2021-04-01 PROCEDURE — 99232 SBSQ HOSP IP/OBS MODERATE 35: CPT | Performed by: INTERNAL MEDICINE

## 2021-04-01 PROCEDURE — 85027 COMPLETE CBC AUTOMATED: CPT

## 2021-04-01 PROCEDURE — 36415 COLL VENOUS BLD VENIPUNCTURE: CPT

## 2021-04-01 RX ORDER — LEVOFLOXACIN 750 MG/1
750 TABLET ORAL DAILY
Qty: 7 TAB | Refills: 0 | Status: SHIPPED | OUTPATIENT
Start: 2021-04-01 | End: 2021-04-08

## 2021-04-01 RX ORDER — LANOLIN ALCOHOL/MO/W.PET/CERES
325 CREAM (GRAM) TOPICAL 2 TIMES DAILY WITH MEALS
Qty: 60 TAB | Refills: 0 | Status: SHIPPED | OUTPATIENT
Start: 2021-04-01 | End: 2021-05-01

## 2021-04-01 RX ORDER — FLUCONAZOLE 150 MG/1
150 TABLET ORAL DAILY
Qty: 1 TAB | Refills: 0 | Status: SHIPPED | OUTPATIENT
Start: 2021-04-01 | End: 2021-04-02

## 2021-04-01 RX ORDER — PREDNISONE 10 MG/1
TABLET ORAL
Qty: 30 TAB | Refills: 0 | Status: SHIPPED | OUTPATIENT
Start: 2021-04-01 | End: 2021-04-13

## 2021-04-01 RX ORDER — HYDROCODONE BITARTRATE AND HOMATROPINE METHYLBROMIDE 1.5; 5 MG/5ML; MG/5ML
5 SYRUP ORAL
Qty: 150 ML | Refills: 0 | Status: SHIPPED | OUTPATIENT
Start: 2021-04-01 | End: 2021-04-06

## 2021-04-01 RX ADMIN — METOPROLOL SUCCINATE 50 MG: 50 TABLET, EXTENDED RELEASE ORAL at 08:19

## 2021-04-01 RX ADMIN — POLYETHYLENE GLYCOL 3350 17 G: 17 POWDER, FOR SOLUTION ORAL at 11:40

## 2021-04-01 RX ADMIN — GUAIFENESIN 1200 MG: 600 TABLET ORAL at 08:19

## 2021-04-01 RX ADMIN — PREDNISONE 60 MG: 20 TABLET ORAL at 08:16

## 2021-04-01 RX ADMIN — LEVALBUTEROL HYDROCHLORIDE 1.25 MG: 1.25 SOLUTION RESPIRATORY (INHALATION) at 09:44

## 2021-04-01 RX ADMIN — FAMOTIDINE 40 MG: 20 TABLET, FILM COATED ORAL at 08:16

## 2021-04-01 RX ADMIN — Medication 5 ML: at 06:49

## 2021-04-01 RX ADMIN — FERROUS SULFATE TAB 325 MG (65 MG ELEMENTAL FE) 325 MG: 325 (65 FE) TAB at 08:16

## 2021-04-01 RX ADMIN — LEVOTHYROXINE SODIUM 75 MCG: 0.07 TABLET ORAL at 08:17

## 2021-04-01 RX ADMIN — CEFEPIME HYDROCHLORIDE 2 G: 2 INJECTION, POWDER, FOR SOLUTION INTRAVENOUS at 13:07

## 2021-04-01 RX ADMIN — VENLAFAXINE 75 MG: 75 TABLET ORAL at 08:19

## 2021-04-01 RX ADMIN — BUDESONIDE 500 MCG: 0.5 INHALANT RESPIRATORY (INHALATION) at 09:44

## 2021-04-01 RX ADMIN — CEFEPIME HYDROCHLORIDE 2 G: 2 INJECTION, POWDER, FOR SOLUTION INTRAVENOUS at 02:24

## 2021-04-01 NOTE — PROGRESS NOTES
END OF SHIFT NOTE:    INTAKE/OUTPUT  03/31 0701 - 04/01 0700  In: 1380 [P.O.:1380]  Out: 2050 [Urine:2050]  Voiding: YES  Catheter: NO      Flatus: Patient does have flatus present. VITAL SIGNS  Patient Vitals for the past 12 hrs:   Temp Pulse Resp BP SpO2   04/01/21 0352 97.9 °F (36.6 °C) 83 18 116/61 91 %   03/31/21 2249 98.1 °F (36.7 °C) 68 18 114/64 97 %   03/31/21 1957 97.8 °F (36.6 °C) 78 18 (!) 143/67 96 %   03/31/21 1922     96 %       Pain Assessment  Pain Intensity 1: 0 (04/01/21 0225)  Pain Location 1: Chest(due to cough)     Patient Stated Pain Goal: 0    Ambulating  Yes, to bathroom. Shift report given to oncoming nurse at the bedside.     Alize Denins RN

## 2021-04-01 NOTE — PROGRESS NOTES
Problem: Falls - Risk of  Goal: *Absence of Falls  Description: Document Teagan Fleming Fall Risk and appropriate interventions in the flowsheet.   Outcome: Progressing Towards Goal  Note: Fall Risk Interventions:            Medication Interventions: Patient to call before getting OOB, Teach patient to arise slowly                   Problem: Airway Clearance - Ineffective  Goal: *Patent airway  Outcome: Progressing Towards Goal  Goal: *Able to cough effectively  Outcome: Progressing Towards Goal     Problem: General Medical Care Plan  Goal: *Vital signs within specified parameters  Outcome: Progressing Towards Goal  Goal: *Optimal pain control at patient's stated goal  Outcome: Progressing Towards Goal

## 2021-04-01 NOTE — PROGRESS NOTES
Care Management Interventions  PCP Verified by CM: Yes(Dr. Pj Trujillo)  Mode of Transport at Discharge: Other (see comment)( Armando Console)  Transition of Care Consult (CM Consult): Discharge Planning  Discharge Durable Medical Equipment: No  Physical Therapy Consult: Yes  Occupational Therapy Consult: Yes  Speech Therapy Consult: No  Current Support Network: Own Home, Lives with Spouse  Confirm Follow Up Transport: Family   Resource Information Provided?: No  Discharge Location  Discharge Placement: Home    Patient to discharge home today. No CM needs have been identified. All milestones for discharge have been met. Patient will transport home with . CM remains available should any needs arise.

## 2021-04-01 NOTE — DISCHARGE SUMMARY
Hospitalist Discharge Summary     Admit Date:  3/29/2021  8:45 AM   DC note date: 2021  Name:  Rowan Mahmood   Age:  70 y.o.  :  1945   MRN:  [id number]   PCP:  Atul Vargas MD  Treatment Team: Attending Provider: Laura Flores MD; Consulting Provider: Shreya Schaefer MD; Utilization Review: Devaughn Calderón RN; Care Manager: Kole Arriaga RN; Staff Nurse: Mariea Bumpers, RN; Occupational Therapist: Penelope Amezquita OT    Problem List for this Hospitalization:  Hospital Problems as of 2021 Date Reviewed: 2020          Codes Class Noted - Resolved POA    Leukocytosis ICD-10-CM: M36.904  ICD-9-CM: 288.60  3/31/2021 - Present Unknown        Pleural effusion, bilateral ICD-10-CM: J90  ICD-9-CM: 511.9  3/31/2021 - Present Unknown        Thrombocytosis ([facility] ICD-10-CM: D47.3  ICD-9-CM: 238.71  3/30/2021 - Present Yes        Cough ICD-10-CM: R05  ICD-9-CM: 786.2  3/29/2021 - Present Yes        * (Principal) Parapneumonic effusion ICD-10-CM: J18.9, J91.8  ICD-9-CM: 511.89  3/29/2021 - Present Yes        Dyspnea ICD-10-CM: R06.00  ICD-9-CM: 786.09  3/29/2021 - Present Yes        Community acquired pneumonia ICD-10-CM: J18.9  ICD-9-CM: 486  3/29/2021 - Present Yes        Atrial fibrillation ([facility] (Chronic) ICD-10-CM: I48.91  ICD-9-CM: 427.31  3/29/2021 - Present Yes        Acquired hypothyroidism (Chronic) ICD-10-CM: E03.9  ICD-9-CM: 244.9  5/10/2016 - Present Yes        Bronchiectasis ([facility] (Chronic) ICD-10-CM: J47.9  ICD-9-CM: 494.0  5/10/2016 - Present Yes        GERD (gastroesophageal reflux disease) (Chronic) ICD-10-CM: K21.9  ICD-9-CM: 530.81  2013 - Present Yes                Admission HPI from 3/29/2021:    \" Patient is a 70-year-old  female with history of bronchiectasis (following with [facility]), Aspergillus fungus infection, acquired hypothyroidism status post thyroidectomy from Graves' disease, A. fib on Eliquis who presented to the ER with worsening cough and dyspnea on exertion for past 2 to 4 days. Patient reported being diagnosed with pneumonia about a week ago and was seen in urgent care, where she was given a week of doxycycline and recently took prednisone for 3 days. Patient felt better temporarily while being on prednisone but symptoms came back again since she stopped it. She also had a recent viral gastroenteritis which was mostly associated with nausea. She denied having any fever, chills, vomiting, abdominal pain. She reports having dry cough, reports chest discomfort on coughing and deep breathing. She was tested negative for Covid about a week ago. CT chest with contrast showed bilateral pleural effusion and bibasilar, left greater than right, moderate size. Effusion with no evidence of pulmonary embolism. Blood work was essentially unremarkable with PCT 0.08, lactic acid 1.5, WBC 19.8. \"    Hospital Course:  Mrs. Giovana Cherry is a very n ice 69 y/o WF with a h/o AFib/SSS s/p recent PPM placement, bronchiectasis, hypothyroidism (following thyroidectomy 2/2 Graves dz), prior infections with MAC and aspergillus who presented to the ER on 3/29 with SOB. Had recent visit to Urgent Care with similar symptoms and completed 1 week doxycycline. CXR showed bilateral effusions, CT chest showed the same. Pulmonology was consulted. She underwent left sided thoracentesis on 3/30 with removal of around 400cc fluid. Fluid exudative. Culture negative, cytology pending. Legionella study pending. Chest x-ray improved post thoracentesis and patient clinically feels better. She is on antibiotics. Remains on RA. Cardiology saw for small pericardial effusion, no evidence of tamponade. Pulmonology is okay to discharge patient home from pulmonology standpoint. Patient is started on Levaquin on discharge to cover Pseudomonas for 1 week. Patient is given prednisone taper on discharge.   She needs to follow-up with pulmonology in 2 weeks with chest x-ray and CBC. Disposition: Home or Self Care  Activity: Activity as tolerated  Diet: No diet orders on file  Code Status: Full Code    Follow Up Orders: Follow-up Appointments   Procedures    FOLLOW UP VISIT Appointment in: Two Weeks With cxr and cbc     With cxr and cbc     Standing Status:   Standing     Number of Occurrences:   1     Order Specific Question:   Appointment in     Answer: Two Weeks       Follow-up Information     Follow up With Specialties Details Why Nilda Mauricio MD Pulmonary [facility]   [address] [phone]      Anita Land MD Internal Medicine   [address] [place] [address]  [phone]            Discharge meds at bottom of this note. Plan was discussed with patient. All questions answered. Patient was stable at time of discharge. Given instructions to call a physician or return if any concerns. Discharge summary and encounter summary was sent to PCP electronically via \"Comm Mgt\" link in [facility], if possible. Diagnostic Imaging/Tests:   Xr Chest Pa Lat    Result Date: 3/31/2021  Chest X-ray INDICATION: Follow-up effusions PA and lateral views of the chest were obtained. FINDINGS: There are persistent but smaller bilateral pleural effusions. There is no significant infiltrate. The heart size is normal.  Pacemaker is present. Persistent but smaller bilateral effusions     Xr Chest Pa Lat    Result Date: 3/29/2021  Chest X-ray INDICATION: Cough and shortness of breath PA and lateral views of the chest were obtained. FINDINGS: There is developing infiltrate and effusion in the left lung base. Mild interstitial prominence is also present in both lung bases. There is mild cardiomegaly. Pacemaker is present.       Left lower lobe infiltrate and effusion, concerning for pneumonia     Ct Chest W Cont    Result Date: 3/29/2021  CHEST CT INDICATION: COVID-19, increasing shortness of breath Multiple axial images were obtained through the chest during intravenous infusion of 100mL of Isovue 370. Coronal reformats were also evaluated. Radiation dose reduction techniques were used for this study: All CT scans performed at this facility use one or all of the following: Automated exposure control, adjustment of the mA and/or kVp according to patient's size, iterative reconstruction. COMPARISON: 2016 FINDINGS: - LUNGS: There are bilateral pleural effusions, left greater than right. Associated atelectasis in both lung bases, also left greater than right. - HEART/VESSELS: Normal enhancement of pulmonary arteries and aorta. There is a pericardial effusion, 18 mm in greatest thickness. - MEDIASTINUM/AXILLA: No significant adenopathy. - CHEST WALL/BONES: Normal. - UPPER ABDOMEN: Mild diffuse fatty infiltration of the liver. Several slightly prominent vessels are noted near the left adrenal gland, likely a vascular malformation or varices. 1.  Bilateral pleural effusions and bibasilar atelectasis, left greater than right. 2.  Moderate size pericardial effusion. 3.  No evidence of pulmonary embolus.  ** If there are any questions about this report, I can be reached on [employer] or at [phone] **      Echocardiogram results:  Results for orders placed or performed during the hospital encounter of 21   2D ECHO COMPLETE ADULT (TTE) W OR WO CONTR    Narrative    [facility] [address], [facility] [address] [facility]  [phone]    Transthoracic Echocardiogram  2D, M-mode, Doppler, and Color Doppler    Patient: Claribel Puentes  MR #: [id number]  : 1945  Age: 70 years  Gender: Female  Study date: 29-Mar-2021  Account #: [de-identified]  Height: 63 in  Weight: 131.8 lb  BSA: 1.62 mï¾²  Status:Routine  Location: ER12  BP: 126/ 78    Allergies: DULOXETINE, AMOXICILLIN-POT CLAVULANATE, SULFA (SULFONAMIDE  ANTIBIOTICS), SULFUR    Sonographer:  Linh Berman RCS  Group:  [facility]  Referring Physician:  Byron Massey MD  Reading Physician:  Sherlyn Lagunas MD    PROCEDURE: This was a routine study. A transthoracic echocardiogram was  performed. The study included complete 2D imaging, M-mode, complete spectral  Doppler, and color Doppler. Image quality was adequate. LEFT VENTRICLE: Normal diastolic function with an average E/e' of 12.9 Size   was  normal. Systolic function was normal. Ejection fraction was estimated in the  range of 55 % to 60 %. There were no regional wall motion abnormalities. Wall  thickness was normal.    RIGHT VENTRICLE: The size was normal. Systolic function was normal. The  tricuspid jet envelope definition was inadequate for estimation of RV   systolic  pressure. LEFT ATRIUM: Size was normal.    RIGHT ATRIUM: Size was normal.    SYSTEMIC VEINS: IVC: The inferior vena cava was normal in size and course. AORTIC VALVE: The valve was structurally normal, tri-commissural. There was   no  evidence for stenosis. There was no insufficiency. MITRAL VALVE: Valve structure was normal. There was no evidence for stenosis. There was no regurgitation. TRICUSPID VALVE: The valve structure was normal. There was no evidence for  stenosis. There was no regurgitation. PULMONIC VALVE: The valve structure was normal. There was no evidence for  stenosis. There was trivial regurgitation. PERICARDIUM: A small pericardial effusion was identified. There was no   evidence  of hemodynamic compromise. AORTA: The root exhibited normal size. SUMMARY:    -  Left ventricle: Normal diastolic function with an average E/e' of 48.6  Systolic function was normal. Ejection fraction was estimated in the range of  55 % to 60 %. There were no regional wall motion abnormalities. -  Pericardium: A small pericardial effusion was identified. There was no  evidence of hemodynamic compromise.     SYSTEM MEASUREMENT TABLES    2D mode  AoR Diam (2D): 3.2 cm  LA Dimension (2D): 1.9 cm  Left Atrium Systolic Volume Index; Method of Disks, Biplane; 2D mode;: 16.4  ml/m2  IVS/LVPW (2D): 0.8  IVSd (2D): 0.8 cm  LVIDd (2D): 3.9 cm  LVIDs (2D): 2.9 cm  LVPWd (2D): 1 cm  RVIDd (2D): 2.3 cm    Unspecified Scan Mode  Peak Grad; Mean; Antegrade Flow: 5 mm[Hg]  Vmax;  Antegrade Flow: 117 cm/s    Prepared and signed by    Elizabeth Alejandra MD  Signed 29-Mar-2021 16:08:18         Procedures done this admission:  Procedure(s):  ULTRASOUND  THORACENTESIS    All Micro Results     Procedure Component Value Units Date/Time    CULTURE, BODY FLUID [facility] STAIN [[id number]] Collected: 03/30/21 1410    Order Status: Completed Specimen: Pleural Fluid Updated: 04/01/21 1012     Special Requests: NO SPECIAL REQUESTS        GRAM STAIN 1 TO 10 WBCS SEEN PER OIF      NO DEFINITE ORGANISM SEEN        Culture result: NO GROWTH 1 DAY       CULTURE, RESPIRATORY[facility] [[id number]] Collected: 03/30/21 0141    Order Status: Completed Specimen: Sputum Updated: 04/01/21 0907     Special Requests: NO SPECIAL REQUESTS        GRAM STAIN 5 TO 40 WBC'S/OIF      0 TO 2 EPITHELIAL CELLS SEEN /OIF      FEW GRAM POSITIVE COCCI         FEW GRAM NEGATIVE RODS         FEW GRAM NEGATIVE COCCI         3+ MUCUS PRESENT        Culture result:       MODERATE NORMAL RESPIRATORY NIMA                  CULTURE IN PROGRESS,FURTHER UPDATES TO FOLLOW          CULTURE, BLOOD [[id number]] Collected: 03/29/21 0920    Order Status: Completed Specimen: Blood Updated: 04/01/21 0752     Special Requests: --        LEFT  FOREARM       Culture result: NO GROWTH 3 DAYS       CULTURE, BLOOD [[id number]]  (Abnormal) Collected: 03/29/21 0920    Order Status: Completed Specimen: Blood Updated: 04/01/21 0709     Special Requests: --        RIGHT  Antecubital       GRAM STAIN GRAM POSITIVE RODS         ANAEROBIC BOTTLE POSITIVE               RESULTS VERIFIED, PHONED TO AND READ BACK BY Gracie Carlton RN @ 4602 ON 3.31.21 BY [facility] Culture result: GRAM POSITIVE RODS               CULTURE IN [address] UPDATES TO FOLLOW          AFB CULTURE + SMEAR W/RFLX ID FROM CULTURE [[id number]] Collected: 03/30/21 1410    Order Status: Completed Updated: 03/30/21 1725    FUNGUS CULTURE AND SMEAR [[id number]] Collected: 03/30/21 1410    Order Status: Completed Updated: 03/30/21 1724    RESPIRATORY VIRUS PANEL W/COVID-19, PCR [[id number]] Collected: 03/30/21 1046    Order Status: Completed Specimen: Nasopharyngeal Updated: 03/30/21 1243     Adenovirus NOT DETECTED        Coronavirus 229E NOT DETECTED        Coronavirus HKU1 NOT DETECTED        Coronavirus CVNL63 NOT DETECTED        Coronavirus OC43 NOT DETECTED        Metapneumovirus NOT DETECTED        Rhinovirus and Enterovirus NOT DETECTED        Influenza A NOT DETECTED        Influenza B NOT DETECTED        Parainfluenza 1 NOT DETECTED        Parainfluenza 2 NOT DETECTED        Parainfluenza 3 NOT DETECTED        Parainfluenza virus 4 NOT DETECTED        RSV by PCR NOT DETECTED        B. parapertussis, PCR NOT DETECTED        Bordetella pertussis - PCR NOT DETECTED        Chlamydophila pneumoniae DNA, QL, PCR NOT DETECTED        Mycoplasma pneumoniae DNA, QL, PCR NOT DETECTED        SARS-CoV-2, PCR NOT DETECTED        Comment: This test has been authorized by the FDA under an Emergency Use Authorization (EUA) for use by authorized laboratories.         LEGIONELLA PNEUMOPHILA AG, URINE [[id number]] Collected: 03/30/21 0140    Order Status: Completed Specimen: Urine Updated: 03/30/21 0225          SARS-CoV-2 Lab Results  \"Novel Coronavirus\" Test: No results found for: COV2NT   \"Emergent Disease\" Test: No results found for: EDPR  \"SARS-COV-2\" Test: No results found for: XGCOVT  Rapid Test: No results found for: COVR         Labs: Results:       BMP, Mg, Phos Recent Labs     04/01/21  0359 03/31/21  0348 03/30/21  1349 03/30/21  0423   NA  --  138  --  138   K  --  3.8 3.7 3.3*   CL  --  106  -- 105   CO2  --  28  --  25   AGAP  --  4*  --  8   BUN  --  14  --  9   CREA  --  0.71  --  0.78   CA  --  8.3  --  8.5   GLU  --  98  --  142*   MG 2.8* 2.4  --  2.4      CBC Recent Labs     04/01/21  0359 03/31/21  0348 03/30/21  0423   WBC 17.7* 21.4* 24.1*   RBC 3.55* 3.56* 3.57*   HGB 10.4* 10.1* 10.4*   HCT 32.9* 32.1* 32.5*   * 643* 608*      LFT No results for input(s): ALT, TBIL, AP, TP, ALB, GLOB, AGRAT in the last 72 hours.     No lab exists for component: SGOT, GPT   Cardiac Testing No results found for: BNPP, BNP, CPK, RCK1, RCK2, RCK3, RCK4, CKMB, CKNDX, CKND1, TROPT, TROIQ   Coagulation Tests No results found for: PTP, INR, APTT, INREXT   A1c No results found for: HBA1C, HGBE8, ECO5ZKOR   Lipid Panel No results found for: CHOL, CHOLPOCT, CHOLX, CHLST, CHOLV, 751260, HDL, HDLP, LDL, LDLC, DLDLP, 352310, VLDLC, VLDL, TGLX, TRIGL, TRIGP, TGLPOCT, CHHD, CHHDX   Thyroid Panel Lab Results   Component Value Date/Time    TSH 0.330 (L) 03/30/2021 04:23 AM        Most Recent UA No results found for: COLOR, APPRN, REFSG, GREGG, PROTU, GLUCU, KETU, BILU, BLDU, UROU, DELANEY, LEUKU, WBCU, RBCU, UEPI, BACTU, CASTS, UCRY, MUCUS, UCOM     Allergies   Allergen Reactions    Duloxetine Unknown (comments) and Other (comments)    Augmentin [Amoxicillin-Pot Clavulanate] Diarrhea    Sulfa (Sulfonamide Antibiotics) Unknown (comments)    Sulfur Hives     Immunization History   Administered Date(s) Administered    Influenza High Dose Vaccine PF 10/15/2016, 10/24/2017, 10/01/2018    Influenza Vaccine 09/18/2013, 09/18/2013, 10/01/2014, 10/01/2015, 09/18/2016, 10/15/2018, 10/15/2019, 08/26/2020    Influenza Vaccine (Tri) Adjuvanted (>65 Yrs FLUAD TRI 17382) 10/15/2019    Novel Influenza-H1N1-09, Injectable 10/11/2017    Pneumococcal Conjugate (PCV-13) 12/02/2014    Pneumococcal Polysaccharide (PPSV-23) 01/01/2000, 01/01/2002, 07/20/2015, 01/03/2017    Pneumococcal Vaccine (Unspecified Type) 01/01/2000    TB Skin Test (PPD) 01/01/2005    TB Skin Test (PPD) Intradermal 01/01/2005    Tdap 11/20/2013    Zoster Recombinant 09/25/2019, 09/26/2019       All Labs from Last 24 Hrs:  Recent Results (from the past 24 hour(s))   MAGNESIUM    Collection Time: 04/01/21  3:59 AM   Result Value Ref Range    Magnesium 2.8 (H) 1.8 - 2.4 mg/dL   CBC W/O DIFF    Collection Time: 04/01/21  3:59 AM   Result Value Ref Range    WBC 17.7 (H) 4.3 - 11.1 K/uL    RBC 3.55 (L) 4.05 - 5.2 M/uL    HGB 10.4 (L) 11.7 - 15.4 g/dL    HCT 32.9 (L) 35.8 - 46.3 %    MCV 92.7 79.6 - 97.8 FL    MCH 29.3 26.1 - 32.9 PG    MCHC 31.6 31.4 - 35.0 g/dL    RDW 14.6 11.9 - 14.6 %    PLATELET 403 (H) 252 - 450 K/uL    MPV 9.5 9.4 - 12.3 FL    ABSOLUTE NRBC 0.00 0.0 - 0.2 K/uL       Discharge Exam:  Patient Vitals for the past 24 hrs:   Temp Pulse Resp BP SpO2   04/01/21 1117 97.8 °F (36.6 °C) 78 17 (!) 125/59 97 %   04/01/21 0946     98 %   04/01/21 0819  62      04/01/21 0741 97.8 °F (36.6 °C) (!) 59 18 (!) 120/57 96 %   04/01/21 0352 97.9 °F (36.6 °C) 83 18 116/61 91 %   03/31/21 2249 98.1 °F (36.7 °C) 68 18 114/64 97 %   03/31/21 1957 97.8 °F (36.6 °C) 78 18 (!) 143/67 96 %   03/31/21 1922     96 %   03/31/21 1538 98.3 °F (36.8 °C) 90 18 (!) 144/67 93 %   03/31/21 1506     94 %     Oxygen Therapy  O2 Sat (%): 97 % (04/01/21 1117)  Pulse via Oximetry: 77 beats per minute (04/01/21 0946)  O2 Device: Room air (04/01/21 0946)  O2 Flow Rate (L/min): 3 l/min (03/31/21 1506)    Estimated body mass index is 23.38 kg/m² as calculated from the following:    Height as of this encounter: 5' 3\" (1.6 m). Weight as of this encounter: 59.9 kg (132 lb).       Intake/Output Summary (Last 24 hours) at 4/1/2021 1245  Last data filed at 4/1/2021 0943  Gross per 24 hour   Intake 1300 ml   Output 2150 ml   Net -850 ml       *Note that automatically entered I/Os may not be accurate; dependent on patient compliance with collection and accurate  by assistants. General:    Well nourished. Alert. Eyes:   Normal sclerae. Extraocular movements intact. ENT:  Normocephalic, atraumatic. Moist mucous membranes  CV:   Regular rate and rhythm. No murmur, rub, or gallop. Lungs:  Clear to auscultation bilaterally. No wheezing, rhonchi, or rales. Abdomen: Soft, nontender, nondistended. Extremities: Warm and dry. No cyanosis or edema. Neurologic: CN II-XII grossly intact. No gross focal deficits   Skin:     No rashes or jaundice. Psych:  Normal mood and affect.     Current Med List in Hospital:   Current Facility-Administered Medications   Medication Dose Route Frequency    HYDROcodone-homatropine (HYCODAN) 5-1.5 mg/5 mL (5 mL) oral solution 5 mL  5 mL Oral Q4H PRN    famotidine (PEPCID) tablet 40 mg  40 mg Oral DAILY    ferrous sulfate tablet 325 mg  1 Tab Oral BID WITH MEALS    guaiFENesin ER (MUCINEX) tablet 1,200 mg  1,200 mg Oral DAILY    venlafaxine (EFFEXOR) tablet 75 mg  75 mg Oral DAILY    metoprolol succinate (TOPROL-XL) XL tablet 50 mg  50 mg Oral DAILY    levothyroxine (SYNTHROID) tablet 75 mcg  75 mcg Oral DAILY    sodium chloride (NS) flush 5-40 mL  5-40 mL IntraVENous Q8H    sodium chloride (NS) flush 5-40 mL  5-40 mL IntraVENous PRN    acetaminophen (TYLENOL) tablet 650 mg  650 mg Oral Q6H PRN    Or    acetaminophen (TYLENOL) suppository 650 mg  650 mg Rectal Q6H PRN    polyethylene glycol (MIRALAX) packet 17 g  17 g Oral DAILY PRN    promethazine (PHENERGAN) tablet 12.5 mg  12.5 mg Oral Q6H PRN    Or    ondansetron (ZOFRAN) injection 4 mg  4 mg IntraVENous Q6H PRN    potassium chloride 10 mEq in 100 ml IVPB  10 mEq IntraVENous PRN    magnesium sulfate 2 g/50 ml IVPB (premix or compounded)  2 g IntraVENous PRN    levalbuterol (XOPENEX) nebulizer soln 1.25 mg/3 mL  1.25 mg Nebulization Q6H RT    budesonide (PULMICORT) 500 mcg/2 ml nebulizer suspension  500 mcg Nebulization BID RT    cefepime (MAXIPIME) 2 g in 0.9% sodium chloride (MBP/ADV) 100 mL MBP  2 g IntraVENous Q12H    predniSONE (DELTASONE) tablet 60 mg  60 mg Oral DAILY WITH BREAKFAST       Discharge Info:   Current Discharge Medication List      START taking these medications    Details   ferrous sulfate 325 mg (65 mg iron) tablet Take 1 Tab by mouth two (2) times daily (with meals) for 30 days. Qty: 60 Tab, Refills: 0      HYDROcodone-homatropine (HYCODAN) 5-1.5 mg/5 mL (5 mL) oral solution Take 5 mL by mouth every four to six (4-6) hours as needed for Cough for up to 5 days. Max Daily Amount: 30 mL. Qty: 150 mL, Refills: 0    Associated Diagnoses: Cough      predniSONE (DELTASONE) 10 mg tablet 40 mg for first 3 days, follow by 30 mg for next 3 days, follow by 20 mg for next 3 days, follow by 10 mg for next 3 days and then stop  Qty: 30 Tab, Refills: 0      levoFLOXacin (LEVAQUIN) 750 mg tablet Take 1 Tab by mouth daily for 7 days. Qty: 7 Tab, Refills: 0      fluconazole (DIFLUCAN) 150 mg tablet Take 1 Tab by mouth daily for 1 day. FDA advises cautious prescribing of oral fluconazole in pregnancy. Qty: 1 Tab, Refills: 0         CONTINUE these medications which have NOT CHANGED    Details   pantoprazole (Protonix) 40 mg tablet Take 40 mg by mouth Daily (before dinner). fluticasone propion-salmeteroL (Wixela Inhub) 250-50 mcg/dose diskus inhaler Take 1 Puff by inhalation two (2) times a day. Qty: 1 Inhaler, Refills: 11      fluticasone propionate (FLONASE) 50 mcg/actuation nasal spray 2 Sprays by Both Nostrils route daily. Qty: 3 Bottle, Refills: 3      levalbuterol (Xopenex) 0.63 mg/3 mL nebu 3 mL by Nebulization route two (2) times a day. File to Medicare part B--diagnosis J47.9  Qty: 60 Nebule, Refills: 11      raloxifene (EVISTA) 60 mg tablet Take 1 Tab by mouth daily. Qty: 30 Tab, Refills: 12    Associated Diagnoses: Osteopenia, unspecified location      guaiFENesin ER (MUCINEX) 600 mg ER tablet Take 600 mg by mouth daily.       magnesium 250 mg tab Take 1 Tab by mouth daily. cyanocobalamin (VITAMIN B-12) 500 mcg tablet Take 500 mcg by mouth daily. Calcium Carbonate-Vit D3-Min (CALTRATE 600+D PLUS MINERALS) 600 mg calcium- 400 unit Tab Take 1 Tab by mouth.      levothyroxine (SYNTHROID) 75 mcg tablet Take 75 mcg by mouth daily. metoprolol-XL (TOPROL XL) 50 mg XL tablet Take 50 mg by mouth daily. venlafaxine (EFFEXOR) 75 mg tablet Take 75 mg by mouth daily. levalbuterol tartrate (XOPENEX) 45 mcg/actuation inhaler Take 2 Puffs by inhalation every four (4) hours as needed for Wheezing. Qty: 1 Inhaler, Refills: 11    Associated Diagnoses: Bronchiectasis without complication ([address]      conjugated estrogens (PREMARIN) 0.625 mg/gram vaginal cream Insert 0.5 g into vagina every Monday, Wednesday, Friday. Qty: 30 g, Refills: 6    Associated Diagnoses: Atrophic vaginitis               Time spent in patient discharge planning and coordination 35 minutes.     Signed:  Jose Carrero MD

## 2021-04-01 NOTE — PROGRESS NOTES
Malika Kendall  Admission Date: 3/29/2021             Daily Progress Note: 4/1/2021    The patient's chart is reviewed and the patient is discussed with the staff. 70-year-old  female past medical history of recent pacemaker placemen (Jan) t for A. fib and sick sinus syndrome. She has a history of bronchiectasis and reports history of WILLAM and Aspergillus. She has had an unproductive cough for several months now and also chest discomfort across her upper chest associated with deep breathing and cough. CT showed bilateral pleural effusions (L > R) and a small pericardial effusion. Echo shows a normal EF and a small effusion as well. BNP was initially 590 and is up to 782. She was given lasix here. CRP is 241. She has been started on steroids and a left thoracentesis was done on 3/30 removing 375 mls of exudative fluid. RSV is neg, recent neg COVID, legionella pending. She is on Maxipeme. Subjective:     She is feeling much better. Still with cough which is worse first thing in the morning and at night. Has not used Hycodan yet.  at bedside.      Current Facility-Administered Medications   Medication Dose Route Frequency    HYDROcodone-homatropine (HYCODAN) 5-1.5 mg/5 mL (5 mL) oral solution 5 mL  5 mL Oral Q4H PRN    famotidine (PEPCID) tablet 40 mg  40 mg Oral DAILY    ferrous sulfate tablet 325 mg  1 Tab Oral BID WITH MEALS    guaiFENesin ER (MUCINEX) tablet 1,200 mg  1,200 mg Oral DAILY    venlafaxine (EFFEXOR) tablet 75 mg  75 mg Oral DAILY    metoprolol succinate (TOPROL-XL) XL tablet 50 mg  50 mg Oral DAILY    levothyroxine (SYNTHROID) tablet 75 mcg  75 mcg Oral DAILY    sodium chloride (NS) flush 5-40 mL  5-40 mL IntraVENous Q8H    sodium chloride (NS) flush 5-40 mL  5-40 mL IntraVENous PRN    acetaminophen (TYLENOL) tablet 650 mg  650 mg Oral Q6H PRN    Or    acetaminophen (TYLENOL) suppository 650 mg  650 mg Rectal Q6H PRN    polyethylene glycol (MIRALAX) packet 17 g  17 g Oral DAILY PRN    promethazine (PHENERGAN) tablet 12.5 mg  12.5 mg Oral Q6H PRN    Or    ondansetron (ZOFRAN) injection 4 mg  4 mg IntraVENous Q6H PRN    potassium chloride 10 mEq in 100 ml IVPB  10 mEq IntraVENous PRN    magnesium sulfate 2 g/50 ml IVPB (premix or compounded)  2 g IntraVENous PRN    levalbuterol (XOPENEX) nebulizer soln 1.25 mg/3 mL  1.25 mg Nebulization Q6H RT    budesonide (PULMICORT) 500 mcg/2 ml nebulizer suspension  500 mcg Nebulization BID RT    cefepime (MAXIPIME) 2 g in 0.9% sodium chloride (MBP/ADV) 100 mL MBP  2 g IntraVENous Q12H    predniSONE (DELTASONE) tablet 60 mg  60 mg Oral DAILY WITH BREAKFAST         Objective:     Vitals:    03/31/21 1922 03/31/21 1957 03/31/21 2249 04/01/21 0352   BP:  (!) 143/67 114/64 116/61   Pulse:  78 68 83   Resp:  18 18 18   Temp:  97.8 °F (36.6 °C) 98.1 °F (36.7 °C) 97.9 °F (36.6 °C)   SpO2: 96% 96% 97% 91%   Weight:       Height:         Intake and Output:   03/30 1901 - 04/01 0700  In: 1970 [P.O.:1620; I.V.:350]  Out: 3000 [Urine:3000]  04/01 0701 - 04/01 1900  In: 100 [I.V.:100]  Out: -     Physical Exam:   Constitutional:  the patient is well developed and in no acute distress  HEENT:  Sclera clear, pupils equal, oral mucosa moist  Lungs: a few crackles from posterior and anterior - not as much from posterior. Still coughing some with conversation. On room air  Cardiovascular:  RRR without M,G,R  Abd/GI: soft and non-tender; with positive bowel sounds. Ext: warm without cyanosis. There is no lower leg edema. Musculoskeletal: moves all four extremities with equal strength  Skin:  no jaundice or rashes, no wounds   Neuro: no gross neuro deficits   Musculoskeletal: can ambulate. No deformity  Psychiatric: Calm.      Review of Systems - General ROS: positive for  - fatigue  Respiratory ROS: positive for - cough and pleuritic pain  Cardiovascular ROS: no chest pain or dyspnea on exertion  Gastrointestinal ROS: recent nausea and vomiting thought to be secondary GI virus  Musculoskeletal ROS: positive for - none   Lines:  Peripheral IV    CT       Left ventricle: Normal diastolic function with an average E/e' of 63.2  Systolic function was normal. Ejection fraction was estimated in the range of  55 % to 60 %. There were no regional wall motion abnormalities.   -  Pericardium: A small pericardial effusion was identified. There was no  evidence of hemodynamic compromise        LAB  Recent Labs     04/01/21  0359 03/31/21  0348 03/30/21  0423 03/29/21  0819   WBC 17.7* 21.4* 24.1* 19.8*   HGB 10.4* 10.1* 10.4* 12.1   HCT 32.9* 32.1* 32.5* 38.4   * 643* 608* 606*     Recent Labs     04/01/21 0359 03/31/21 0348 03/30/21  1349 03/30/21  0423 03/29/21  0819   NA  --  138  --  138 135*   K  --  3.8 3.7 3.3* 4.1   CL  --  106  --  105 102   CO2  --  28  --  25 30   GLU  --  98  --  142* 101*   BUN  --  14  --  9 10   CREA  --  0.71  --  0.78 0.79   MG 2.8* 2.4  --  2.4  --    ALB  --   --   --   --  2.6*       Assessment:  (Medical Decision Making)     Hospital Problems  Date Reviewed: 11/23/2020          Codes Class Noted POA    Thrombocytosis ([facility] [address] ICD-10-CM: D47.3  ICD-9-CM: 238.71  3/30/2021 Yes    Persistent/worse    Cough ICD-10-CM: R05  ICD-9-CM: 786.2  3/29/2021 Yes    Ongoing, nonproductive    * (Principal) Parapneumonic effusion ICD-10-CM: J18.9, J91.8  ICD-9-CM: 511.89  3/29/2021 Yes    Exudative, culture neg/cytology pending    Dyspnea ICD-10-CM: R06.00  ICD-9-CM: 786.09  3/29/2021 Yes    Better as cough is better    Community acquired pneumonia ICD-10-CM: J18.9  ICD-9-CM: 081  3/29/2021 Yes    ?     Atrial fibrillation (HCC) (Chronic) ICD-10-CM: I48.91  ICD-9-CM: 427.31  3/29/2021 Yes    Hx of with SSS/pacer    Acquired hypothyroidism (Chronic) ICD-10-CM: E03.9  ICD-9-CM: 244.9  5/10/2016 Yes    On Rx    Bronchiectasis ([facility] [address] (Chronic) ICD-10-CM: J47.9  ICD-9-CM: 494.0  5/10/2016 Yes    Cough unproductive    GERD (gastroesophageal reflux disease) (Chronic) ICD-10-CM: K21.9  ICD-9-CM: 530.81  9/12/2013 Yes    On pepcid          Plan:  (Medical Decision Making)   1. CXR yesterday improved. She clinically feels better. Chest pain resolved. Cough better but still a problem in the morning and at night. Wants a script for hycodan at discharge. 2. Left thoracentesis 3/30 - 375 mls removed. Fluid exudative. Culture neg/cytology pending  3. Day 4 Maxipeme. WBC down though still elevated (but on steroids). Rx for ? Pneumonia. Kenny Arriaga study pending. 4. Prednisone 60 mg per day. . Has pleural and pericardial effusions - ? Inflammatory process vs infectious  5. Off lasix - fluid balance neg 2.7 liters. Echo with normal EF, pleural fluid exudative  6. ? Home - she will need office follow up with CXR, steroid taper, abx completion and hycodan for cough. She also wants a script for Diflucan at discharge as she typically gets vaginal infection with abx    Albert Yanez NP   Lungs:  Some minimal crackles in lung bases posterior  Heart:  RRR with no Murmur/Rubs/Gallops    Additional Comments:  Ok to go home from our standpoint,  Would place on cipro or levaquin to cover pseudomonas x 1 week, give prednisone taper, we will see in 2 weeks in the office with cxr and cbc    I have spoken with and examined the patient. I agree with the above assessment and plan as documented. Sherly Ricci MD        More than 50% of time documented was spent face-to-face contact with the patient and in the care of the patient on the floor/unit where the patient is located.

## 2021-04-01 NOTE — DISCHARGE INSTRUCTIONS
Disposition: Home or Self Care  Activity: Activity as tolerated       Thoracentesis: What to Expect at Home  Your Recovery  Thoracentesis (say \"vvul-jk-ozg-FRANCHESCA-sis\") is a procedure to remove fluid from the space between the lungs and the chest wall (pleural space). This procedure may also be called a \"chest tap. \" It's normal to have a small amount of fluid in the pleural space. But too much fluid can build up because of problems such as infection, heart failure, or lung cancer. The procedure may have been done to help with shortness of breath and pain caused by the fluid buildup. Or you may have had this procedure so the doctor could test the fluid to find the cause of the buildup. Your chest may be sore where the doctor put the needle or catheter into your skin (the puncture site). This usually gets better after a day or two. You can go back to work or your normal activities as soon as you feel up to it. If a large amount of pleural fluid was removed during the procedure, you will probably be able to breathe more easily. If more pleural fluid collects and needs to be removed, another thoracentesis may be done later. If the doctor sent the fluid to a lab for testing, it may take several days to get the results. The doctor or nurse will discuss the results with you. This care sheet gives you a general idea about how long it will take for you to recover. But each person recovers at a different pace. Follow the steps below to feel better as quickly as possible. How can you care for yourself at home? Activity    · Rest when you feel tired. Getting enough sleep will help you recover.     · Avoid strenuous activities, such as bicycle riding, jogging, weight lifting, or aerobic exercise, until your doctor says it is okay.     · You may shower.  Do not take a bath until the puncture site has healed, or until your doctor tells you it is okay.     · Ask your doctor when you can drive again.     · You may need to take 1 or 2 days off from work. It depends on the type of work you do and how you feel. Diet    · You can eat your normal diet.     · Drink plenty of fluids (unless your doctor tells you not to). Medicines    · Your doctor will tell you if and when you can restart your medicines. He or she will also give you instructions about taking any new medicines.     · If you take aspirin or some other blood thinner, ask your doctor if and when to start taking it again. Make sure that you understand exactly what your doctor wants you to do.     · Be safe with medicines. Take pain medicines exactly as directed. ? If the doctor gave you a prescription medicine for pain, take it as prescribed. ? If you are not taking a prescription pain medicine, ask your doctor if you can take an over-the-counter medicine. ? Do not take two or more pain medicines at the same time unless the doctor told you to. Many pain medicines have acetaminophen, which is Tylenol. Too much acetaminophen (Tylenol) can be harmful.     · If you think your pain medicine is making you sick to your stomach:  ? Take your medicine after meals (unless your doctor has told you not to). ? Ask your doctor for a different pain medicine.     · If your doctor prescribed antibiotics, take them as directed. Do not stop taking them just because you feel better. You need to take the full course of antibiotics. Care of the puncture site    · Wash the area daily with warm, soapy water, and pat it dry. Don't use hydrogen peroxide or alcohol, which may delay healing. You may cover the area with a gauze bandage if it weeps or rubs against clothing. Change the bandage every day.     · Keep the area clean and dry. Follow-up care is a key part of your treatment and safety. Be sure to make and go to all appointments, and call your doctor if you are having problems. It's also a good idea to know your test results and keep a list of the medicines you take.   When should you call for help?   Call 911 anytime you think you may need emergency care. For example, call if:    · You passed out (lost consciousness).     · You have severe trouble breathing.     · You have sudden chest pain and shortness of breath, or you cough up blood. Call your doctor now or seek immediate medical care if:    · You have shortness of breath that is new or getting worse.     · You have new or worse pain in your chest, especially when you take a deep breath.     · You are sick to your stomach or cannot keep fluids down.     · You have a fever over 100°F.     · Bright red blood has soaked through the bandage over your puncture site.     · You have signs of infection, such as:  ? Increased pain, swelling, warmth, or redness. ? Red streaks leading from the puncture site. ? Pus draining from the puncture site. ? Swollen lymph nodes in your neck, armpits, or groin. ? A fever.     · You cough up a lot more mucus than normal, or your mucus changes color. Watch closely for changes in your health, and be sure to contact your doctor if you have any problems. Where can you learn more? Go to http://www.gray.com/  Enter Q755 in the search box to learn more about \"Thoracentesis: What to Expect at Home. \"  Current as of: October 26, 2020               Content Version: 12.8  © 2006-2021 [employer]. Care instructions adapted under license by [employer] (which disclaims liability or warranty for this information). If you have questions about a medical condition or this instruction, always ask your healthcare professional. [place] [address] any warranty or liability for your use of this information.

## 2021-04-02 ENCOUNTER — PATIENT OUTREACH (OUTPATIENT)
Dept: CASE MANAGEMENT | Age: 76
End: 2021-04-02

## 2021-04-02 LAB
BACTERIA SPEC CULT: NORMAL
GRAM STN SPEC: NORMAL
GRAM STN SPEC: NORMAL
SERVICE CMNT-IMP: NORMAL

## 2021-04-02 NOTE — PROGRESS NOTES
Care Transitions Initial Follow Up Call    Call within 2 business days of discharge: Yes     Patient: Seble Gaitan Patient : 1945 MRN: [id number]    Last Discharge [address] [facility]       Complaint Diagnosis Description Type Department Provider    3/29/21 Cough Pneumonia of right lower lobe due to infectious organism . .. ED to Hosp-Admission (Discharged) (ADMIT) Ana Maria Brina MD; Janna Tang,... Was this an external facility discharge? No Discharge Facility: [facility]    Challenges to be reviewed by the provider   Additional needs identified to be addressed with provider no  none         Method of communication with provider : face to face, chart routing, staff message, phone, none    Discussed COVID-19 related testing which was not done at this time. Test results were not done. Patient informed of results, if available? na     Advance Care Planning:   Does patient have an Advance Directive: living will     Inpatient Readmission Risk score: Unplanned Readmit Risk Score: 12    Was this a readmission? yes   Patient stated reason for the admission: pna    Patients top risk factors for readmission: medical condition and medication management pneumonia  Interventions to address risk factors: Scheduled appointment with [address] [place] [address] Transition Nurse (CTN) contacted the patient by telephone to perform post hospital discharge assessment. Verified name and  with patient as identifiers. Provided introduction to self, and explanation of the CTN role. CTN reviewed discharge instructions, medical action plan and red flags with patient who verbalized understanding. Were discharge instructions available to patient? yes. Reviewed appropriate site of care based on symptoms and resources available to patient including: Specialist. Patient given an opportunity to ask questions and does not have any further questions or concerns at this time.  The patient agrees to contact the PCP office for questions related to their healthcare. Medication reconciliation was performed with patient, who verbalizes understanding of administration of home medications. Advised obtaining a 90-day supply of all daily and as-needed medications. Referral to Pharm D needed: declined, no hh ordered, able to verbalize changes and no current questions     Home Health/Outpatient orders at discharge: [address]: na  Date of initial visit: [address] ordered at discharge: None  [address] [address] received: na    Covid Risk Education    Patient has following risk factors of: pneumonia. Education provided regarding infection prevention, and signs and symptoms of COVID-19 and when to seek medical attention with patient who verbalized understanding. Discussed exposure protocols and quarantine From CDC: Are you at higher risk for severe illness?  and given an opportunity for questions and concerns. The patient agrees to contact the COVID-19 hotline [phone] or PCP office for questions related to COVID-19. For more information on steps you can take to protect yourself, see CDC's How to Protect Yourself     Was patient discharged with a pulse oximeter? no Discussed and confirmed pulse oximeter discharge instructions and when to notify provider or seek emergency care. Patient/family/caregiver given information for [employer] and agrees to enroll no  Patient's preferred e-mail: declines  Patient's preferred phone number: declines    Discussed follow-up appointments. If no appointment was previously scheduled, appointment scheduling offered: yes Is follow up appointment scheduled within 7 days of discharge?  patient prefers to see Pulmonary in 2 weeks nad has no plans to see PCP   [address] follow up appointment(s):   Future Appointments   Date Time Provider Son Carr   4/13/2021 10:30 AM Ccey Sarabia NP [facility]     [facility](s): na    Plan for follow-up call in 10-14 days based on severity of symptoms and risk factors. Plan for next call: follow up appointment-follow up after pulmonary visit  CTN provided contact information for future needs.     Goals Addressed    None

## 2021-04-03 LAB
BACTERIA SPEC CULT: NORMAL
SERVICE CMNT-IMP: NORMAL

## 2021-04-05 LAB
BACTERIA SPEC CULT: ABNORMAL
BACTERIA SPEC CULT: ABNORMAL
GRAM STN SPEC: ABNORMAL
SERVICE CMNT-IMP: ABNORMAL

## 2021-04-07 ENCOUNTER — PATIENT OUTREACH (OUTPATIENT)
Dept: CASE MANAGEMENT | Age: 76
End: 2021-04-07

## 2021-04-07 NOTE — PROGRESS NOTES
Patient wit working DRG for PNA, after d/c and final DRG patient enrolled for COPD bundle and not PNA bundle

## 2021-04-12 ENCOUNTER — APPOINTMENT (OUTPATIENT)
Dept: GENERAL RADIOLOGY | Age: 76
End: 2021-04-12
Attending: EMERGENCY MEDICINE
Payer: MEDICARE

## 2021-04-12 ENCOUNTER — HOSPITAL ENCOUNTER (EMERGENCY)
Age: 76
Discharge: HOME | End: 2021-04-12
Attending: EMERGENCY MEDICINE
Payer: MEDICARE

## 2021-04-12 VITALS
RESPIRATION RATE: 18 BRPM | WEIGHT: 130 LBS | SYSTOLIC BLOOD PRESSURE: 108 MMHG | OXYGEN SATURATION: 100 % | DIASTOLIC BLOOD PRESSURE: 56 MMHG | TEMPERATURE: 98 F | BODY MASS INDEX: 23.04 KG/M2 | HEIGHT: 63 IN | HEART RATE: 65 BPM

## 2021-04-12 DIAGNOSIS — R06.00 DYSPNEA, UNSPECIFIED TYPE: ICD-10-CM

## 2021-04-12 DIAGNOSIS — R07.9 CHEST PAIN, UNSPECIFIED TYPE: Primary | ICD-10-CM

## 2021-04-12 LAB
ALBUMIN SERPL-MCNC: 2.8 G/DL (ref 3.2–4.6)
ALBUMIN/GLOB SERPL: 0.7 {RATIO} (ref 1.2–3.5)
ALP SERPL-CCNC: 111 U/L (ref 50–136)
ALT SERPL-CCNC: 17 U/L (ref 12–65)
ANION GAP SERPL CALC-SCNC: 7 MMOL/L (ref 7–16)
AST SERPL-CCNC: 11 U/L (ref 15–37)
ATRIAL RATE: 71 BPM
BASOPHILS # BLD: 0.1 K/UL (ref 0–0.2)
BASOPHILS NFR BLD: 0 % (ref 0–2)
BILIRUB SERPL-MCNC: 0.5 MG/DL (ref 0.2–1.1)
BUN SERPL-MCNC: 8 MG/DL (ref 8–23)
CALCIUM SERPL-MCNC: 8.9 MG/DL (ref 8.3–10.4)
CALCULATED P AXIS, ECG09: 68 DEGREES
CALCULATED R AXIS, ECG10: 68 DEGREES
CALCULATED T AXIS, ECG11: 67 DEGREES
CHLORIDE SERPL-SCNC: 103 MMOL/L (ref 98–107)
CO2 SERPL-SCNC: 25 MMOL/L (ref 21–32)
CREAT SERPL-MCNC: 0.88 MG/DL (ref 0.6–1)
DIAGNOSIS, 93000: NORMAL
DIFFERENTIAL METHOD BLD: ABNORMAL
EOSINOPHIL # BLD: 0 K/UL (ref 0–0.8)
EOSINOPHIL NFR BLD: 0 % (ref 0.5–7.8)
ERYTHROCYTE [DISTWIDTH] IN BLOOD BY AUTOMATED COUNT: 16.2 % (ref 11.9–14.6)
GLOBULIN SER CALC-MCNC: 4.3 G/DL (ref 2.3–3.5)
GLUCOSE SERPL-MCNC: 144 MG/DL (ref 65–100)
HCT VFR BLD AUTO: 41.2 % (ref 35.8–46.3)
HGB BLD-MCNC: 13.2 G/DL (ref 11.7–15.4)
IMM GRANULOCYTES # BLD AUTO: 0.2 K/UL (ref 0–0.5)
IMM GRANULOCYTES NFR BLD AUTO: 1 % (ref 0–5)
LACTATE SERPL-SCNC: 2.2 MMOL/L (ref 0.4–2)
LIPASE SERPL-CCNC: 55 U/L (ref 73–393)
LYMPHOCYTES # BLD: 1.4 K/UL (ref 0.5–4.6)
LYMPHOCYTES NFR BLD: 6 % (ref 13–44)
MAGNESIUM SERPL-MCNC: 2.1 MG/DL (ref 1.8–2.4)
MCH RBC QN AUTO: 29.7 PG (ref 26.1–32.9)
MCHC RBC AUTO-ENTMCNC: 32 G/DL (ref 31.4–35)
MCV RBC AUTO: 92.6 FL (ref 79.6–97.8)
MONOCYTES # BLD: 0.7 K/UL (ref 0.1–1.3)
MONOCYTES NFR BLD: 3 % (ref 4–12)
NEUTS SEG # BLD: 22.8 K/UL (ref 1.7–8.2)
NEUTS SEG NFR BLD: 90 % (ref 43–78)
NRBC # BLD: 0 K/UL (ref 0–0.2)
P-R INTERVAL, ECG05: 156 MS
PLATELET # BLD AUTO: 417 K/UL (ref 150–450)
PMV BLD AUTO: 9.9 FL (ref 9.4–12.3)
POTASSIUM SERPL-SCNC: 4.4 MMOL/L (ref 3.5–5.1)
PROCALCITONIN SERPL-MCNC: <0.05 NG/ML
PROT SERPL-MCNC: 7.1 G/DL (ref 6.3–8.2)
Q-T INTERVAL, ECG07: 358 MS
QRS DURATION, ECG06: 72 MS
QTC CALCULATION (BEZET), ECG08: 389 MS
RBC # BLD AUTO: 4.45 M/UL (ref 4.05–5.2)
SODIUM SERPL-SCNC: 135 MMOL/L (ref 136–145)
TROPONIN-HIGH SENSITIVITY: 5.9 PG/ML (ref 0–14)
TROPONIN-HIGH SENSITIVITY: 8.5 PG/ML (ref 0–14)
VENTRICULAR RATE, ECG03: 71 BPM
WBC # BLD AUTO: 25.2 K/UL (ref 4.3–11.1)

## 2021-04-12 PROCEDURE — 74011250636 HC RX REV CODE- 250/636: Performed by: EMERGENCY MEDICINE

## 2021-04-12 PROCEDURE — 85025 COMPLETE CBC W/AUTO DIFF WBC: CPT

## 2021-04-12 PROCEDURE — 84145 PROCALCITONIN (PCT): CPT

## 2021-04-12 PROCEDURE — 84484 ASSAY OF TROPONIN QUANT: CPT

## 2021-04-12 PROCEDURE — 71046 X-RAY EXAM CHEST 2 VIEWS: CPT

## 2021-04-12 PROCEDURE — 93005 ELECTROCARDIOGRAM TRACING: CPT | Performed by: EMERGENCY MEDICINE

## 2021-04-12 PROCEDURE — 83605 ASSAY OF LACTIC ACID: CPT

## 2021-04-12 PROCEDURE — 83735 ASSAY OF MAGNESIUM: CPT

## 2021-04-12 PROCEDURE — 99284 EMERGENCY DEPT VISIT MOD MDM: CPT

## 2021-04-12 PROCEDURE — 80053 COMPREHEN METABOLIC PANEL: CPT

## 2021-04-12 PROCEDURE — 83690 ASSAY OF LIPASE: CPT

## 2021-04-12 PROCEDURE — 96361 HYDRATE IV INFUSION ADD-ON: CPT

## 2021-04-12 PROCEDURE — 96374 THER/PROPH/DIAG INJ IV PUSH: CPT

## 2021-04-12 RX ORDER — ONDANSETRON 2 MG/ML
4 INJECTION INTRAMUSCULAR; INTRAVENOUS
Status: COMPLETED | OUTPATIENT
Start: 2021-04-12 | End: 2021-04-12

## 2021-04-12 RX ADMIN — ONDANSETRON 4 MG: 2 INJECTION INTRAMUSCULAR; INTRAVENOUS at 15:54

## 2021-04-12 RX ADMIN — SODIUM CHLORIDE 1000 ML: 900 INJECTION, SOLUTION INTRAVENOUS at 15:41

## 2021-04-12 NOTE — ED NOTES
I have reviewed discharge instructions with the patient. The patient verbalized understanding. Patient left ED via Discharge Method: ambulatory to Home with self    Opportunity for questions and clarification provided. Patient given 0 scripts. To continue your aftercare when you leave the hospital, you may receive an automated call from our care team to check in on how you are doing. This is a free service and part of our promise to provide the best care and service to meet your aftercare needs.  If you have questions, or wish to unsubscribe from this service please call [phone]. Thank you for Choosing our [facility] Emergency Department.

## 2021-04-12 NOTE — ED TRIAGE NOTES
Patient arrives from home, mask in place. Patient states chest pain worse with inspiration. Patient was recently treated for pneumonia.

## 2021-04-12 NOTE — ED PROVIDER NOTES
Presents with complaint of upper chest pain and pain with inspiration and lower burning in her chest and abdomen she states she thinks is due to fluid building up in her lungs like it did before. She has been on steroids and antibiotics for pneumonia and pleural effusions. She has had some diarrhea but was most concerned about her increased pain and shortness of breath. Her steroids were stopped on Friday. She called the pulmonary clinic and has an appointment tomorrow.  reports she has low energy. The history is provided by the patient and the spouse. Chest Pain   This is a new problem. The current episode started more than 2 days ago. The problem has been gradually worsening. The problem occurs constantly. The pain is associated with normal activity. The quality of the pain is described as pleuritic. Associated symptoms include cough, nausea and shortness of breath. Pertinent negatives include no fever, no leg pain and no lower extremity edema. Past Medical History:   Diagnosis Date    Abnormal chest CT February, 2015    The patient had a CT scan that showed infiltrative as well as tree-in-bud changes felt to probably represent atypical mycobacteria infection. However, bronchoscopy with BAL was negative. 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  Graves disease 2010    Thyroidectomy    Ineffective airway clearance     Bronchoscopy: 10/3/11, moderate, thick purulent mucus in the right main bronchus, preliminary positive for Aspergillus flavus.     LLL pneumonia 2010    treated as outpatient    Menopause        Past Surgical History:   Procedure Laterality Date    HX CATARACT REMOVAL Bilateral     HX CHOLECYSTECTOMY  2012    HX HYSTERECTOMY      HX ORTHOPAEDIC      right foot surgery    HX OTHER SURGICAL  February, 2016 Bronchoscopy with BAL    HX SALPINGO-OOPHORECTOMY Bilateral     HX THYROIDECTOMY  2010         Family History:   Problem Relation Age of Onset    Asthma Sister     Breast Cancer Sister 79    Cancer Brother         Prostate    Cancer Brother         Prostate    Deep Vein Thrombosis Father         Secondary to a motor vehicle accident.    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   Social Needs    Financial resource strain: Not on file    Food insecurity     Worry: Not on file     Inability: Not on file   [employer] needs     Medical: Not on file     Non-medical: Not on file   Tobacco Use    Smoking status: Never Smoker    Smokeless tobacco: Never Used   Substance and Sexual Activity    Alcohol use: No     Alcohol/week: 0.0 standard drinks    Drug use: No    Sexual activity: Yes     Partners: Male     Birth control/protection: Surgical     Comment: [facility]   Lifestyle    Physical activity     Days per week: Not on file     Minutes per session: Not on file    Stress: Not on file   Relationships    Social connections     Talks on phone: Not on file     Gets together: Not on file     Attends [employer] service: Not on file     Active member of club or organization: Not on file     Attends meetings of clubs or organizations: Not on file     Relationship status: Not on file    Intimate partner violence     Fear of current or ex partner: Not on file     Emotionally abused: Not on file     Physically abused: Not on file     Forced sexual activity: Not on file   Other Topics Concern    Not on file   Social History Narrative    She has lived in second-hand smoke most of her life. ALLERGIES: Duloxetine, Augmentin [amoxicillin-pot clavulanate], Sulfa (sulfonamide antibiotics), and Sulfur    Review of Systems   Constitutional: Negative for chills and fever. Respiratory: Positive for cough and shortness of breath. Cardiovascular: Positive for chest pain. Gastrointestinal: Positive for nausea. All other systems reviewed and are negative. Vitals:    04/12/21 1301   BP: (!) 110/57   Pulse: 65   Resp: 22   Temp: 98.2 °F (36.8 °C)   SpO2: 94%   Weight: 59 kg (130 lb)   Height: 5' 3\" (1.6 m)            Physical Exam  Vitals signs and nursing note reviewed. Constitutional:       Appearance: She is well-developed. She is not toxic-appearing or diaphoretic. Comments: thin   HENT:      Head: Normocephalic and atraumatic. Mouth/Throat:      Mouth: Mucous membranes are dry. Neck:      Musculoskeletal: Normal range of motion and neck supple. Cardiovascular:      Rate and Rhythm: Normal rate and regular rhythm. Pulmonary:      Effort: No tachypnea or respiratory distress. Breath sounds: No decreased breath sounds or wheezing. Abdominal:      General: Bowel sounds are normal.      Palpations: Abdomen is soft. Tenderness: There is no abdominal tenderness. Musculoskeletal: Normal range of motion. Right lower leg: She exhibits no tenderness. No edema. Left lower leg: She exhibits no tenderness. No edema. Skin:     General: Skin is warm and dry. Neurological:      General: No focal deficit present. Mental Status: She is alert and oriented to person, place, and time.    Psychiatric:         Mood and Affect: Mood normal.         Behavior: Behavior normal.          MDM  Number of Diagnoses or Management Options  Chest pain, unspecified type  Dyspnea, unspecified type  Diagnosis management comments: Patient given fluids. Review of cultures shows no growth. WBC elevated but has been on steroids. Seems like every time she stops steroids she feels worse. No effusions getting better she seems relieved about. She is going to see pulmonary tomorrow and they can decide about her steroids.        Amount and/or Complexity of Data Reviewed  Clinical lab tests: ordered and reviewed  Tests in the radiology section of CPT®: ordered and reviewed  Independent visualization of images, tracings, or specimens: yes    Risk of Complications, Morbidity, and/or Mortality  Presenting problems: high  Diagnostic procedures: minimal  Management options: moderate    Patient Progress  Patient progress: improved         Procedures

## 2021-04-20 ENCOUNTER — HOSPITAL ENCOUNTER (OUTPATIENT)
Dept: GENERAL RADIOLOGY | Age: 76
Discharge: HOME | End: 2021-04-20
Payer: MEDICARE

## 2021-04-20 DIAGNOSIS — R05.9 COUGH: ICD-10-CM

## 2021-04-20 DIAGNOSIS — R07.89 CHEST WALL PAIN: ICD-10-CM

## 2021-04-20 PROCEDURE — 71046 X-RAY EXAM CHEST 2 VIEWS: CPT

## 2021-04-20 NOTE — PROGRESS NOTES
Please let patient know that CXR reveals chronic changes in bases only--pneumonia and fluid have resolved.

## 2021-04-20 NOTE — PROGRESS NOTES
Patient was informed that her pneumonia and fluid has been resolved, and that theres mild chronic changes in lung bases. The patient would like to know more of what that chronic change means, and how it will effect her.

## 2021-04-29 LAB
FUNGUS CULTURE, RFCO2T: NORMAL
FUNGUS SMEAR, RFCO1T: NORMAL
FUNGUS SPEC CULT: NORMAL
FUNGUS STAIN, 188244: NORMAL
REFLEX TO ID, RFCO3T: NORMAL
SPECIMEN SOURCE: NORMAL
SPECIMEN SOURCE: NORMAL

## 2021-05-25 ENCOUNTER — TRANSCRIBE ORDER (OUTPATIENT)
Dept: SCHEDULING | Age: 76
End: 2021-05-25

## 2021-05-25 DIAGNOSIS — Z12.31 SCREENING MAMMOGRAM FOR HIGH-RISK PATIENT: Primary | ICD-10-CM

## 2021-06-10 ENCOUNTER — HOSPITAL ENCOUNTER (OUTPATIENT)
Dept: LAB | Age: 76
Discharge: HOME | End: 2021-06-10
Payer: MEDICARE

## 2021-06-10 DIAGNOSIS — R05.9 COUGH: ICD-10-CM

## 2021-06-10 PROCEDURE — 87206 SMEAR FLUORESCENT/ACID STAI: CPT

## 2021-06-10 PROCEDURE — 87116 MYCOBACTERIA CULTURE: CPT

## 2021-06-29 ENCOUNTER — HOSPITAL ENCOUNTER (OUTPATIENT)
Dept: MAMMOGRAPHY | Age: 76
Discharge: HOME | End: 2021-06-29
Attending: INTERNAL MEDICINE
Payer: MEDICARE

## 2021-06-29 DIAGNOSIS — Z12.31 SCREENING MAMMOGRAM FOR HIGH-RISK PATIENT: ICD-10-CM

## 2021-06-29 PROCEDURE — 77063 BREAST TOMOSYNTHESIS BI: CPT

## 2022-03-18 PROBLEM — R06.00 DYSPNEA: Status: ACTIVE | Noted: 2021-03-29

## 2022-03-18 PROBLEM — J18.9 COMMUNITY ACQUIRED PNEUMONIA: Status: ACTIVE | Noted: 2021-03-29

## 2022-03-18 PROBLEM — R05.9 COUGH: Status: ACTIVE | Noted: 2021-03-29

## 2022-03-19 PROBLEM — I48.91 ATRIAL FIBRILLATION (HCC): Status: ACTIVE | Noted: 2021-03-29

## 2022-03-19 PROBLEM — D72.829 LEUKOCYTOSIS: Status: ACTIVE | Noted: 2021-03-31

## 2022-03-19 PROBLEM — J18.9 PARAPNEUMONIC EFFUSION: Status: ACTIVE | Noted: 2021-03-29

## 2022-03-19 PROBLEM — J90 PLEURAL EFFUSION, BILATERAL: Status: ACTIVE | Noted: 2021-03-31

## 2022-03-19 PROBLEM — J91.8 PARAPNEUMONIC EFFUSION: Status: ACTIVE | Noted: 2021-03-29

## 2022-03-20 PROBLEM — D75.839 THROMBOCYTOSIS: Status: ACTIVE | Noted: 2021-03-30

## 2022-04-25 DIAGNOSIS — R05.9 COUGH: ICD-10-CM

## 2022-04-25 DIAGNOSIS — J47.9 BRONCHIECTASIS WITHOUT ACUTE EXACERBATION (HCC): Primary | ICD-10-CM

## 2022-05-20 ENCOUNTER — HOSPITAL ENCOUNTER (INPATIENT)
Age: 77
LOS: 1 days | Discharge: HOME | DRG: 871 | End: 2022-05-21
Attending: EMERGENCY MEDICINE | Admitting: INTERNAL MEDICINE
Payer: MEDICARE

## 2022-05-20 ENCOUNTER — APPOINTMENT (OUTPATIENT)
Dept: GENERAL RADIOLOGY | Age: 77
DRG: 871 | End: 2022-05-20
Attending: EMERGENCY MEDICINE
Payer: MEDICARE

## 2022-05-20 ENCOUNTER — HOSPITAL ENCOUNTER (INPATIENT)
Age: 77
LOS: 4 days | Discharge: HOME | DRG: 871 | End: 2022-05-24
Attending: INTERNAL MEDICINE
Payer: MEDICARE

## 2022-05-20 VITALS
OXYGEN SATURATION: 91 % | HEART RATE: 78 BPM | TEMPERATURE: 99.2 F | RESPIRATION RATE: 16 BRPM | DIASTOLIC BLOOD PRESSURE: 41 MMHG | BODY MASS INDEX: 23.04 KG/M2 | HEIGHT: 63 IN | SYSTOLIC BLOOD PRESSURE: 101 MMHG | WEIGHT: 130 LBS

## 2022-05-20 DIAGNOSIS — J12.82 PNEUMONIA DUE TO COVID-19 VIRUS: ICD-10-CM

## 2022-05-20 DIAGNOSIS — U07.1 PNEUMONIA DUE TO COVID-19 VIRUS: ICD-10-CM

## 2022-05-20 DIAGNOSIS — D72.829 LEUKOCYTOSIS, UNSPECIFIED TYPE: ICD-10-CM

## 2022-05-20 DIAGNOSIS — J18.9 PNEUMONIA OF LEFT LOWER LOBE DUE TO INFECTIOUS ORGANISM: Primary | ICD-10-CM

## 2022-05-20 DIAGNOSIS — A41.9 SEPSIS, DUE TO UNSPECIFIED ORGANISM, UNSPECIFIED WHETHER ACUTE ORGAN DYSFUNCTION PRESENT (HCC): ICD-10-CM

## 2022-05-20 LAB
ALBUMIN SERPL-MCNC: 3.3 G/DL (ref 3.2–4.6)
ALBUMIN/GLOB SERPL: 0.8 {RATIO} (ref 1.2–3.5)
ALP SERPL-CCNC: 98 U/L (ref 50–136)
ALT SERPL-CCNC: 21 U/L (ref 12–65)
ANION GAP SERPL CALC-SCNC: 7 MMOL/L (ref 7–16)
AST SERPL-CCNC: 38 U/L (ref 15–37)
ATRIAL RATE: 76 BPM
BASOPHILS # BLD: 0.1 K/UL (ref 0–0.2)
BASOPHILS NFR BLD: 0 % (ref 0–2)
BILIRUB SERPL-MCNC: 0.8 MG/DL (ref 0.2–1.1)
BUN SERPL-MCNC: 11 MG/DL (ref 8–23)
CALCIUM SERPL-MCNC: 8.7 MG/DL (ref 8.3–10.4)
CALCULATED P AXIS, ECG09: 56 DEGREES
CALCULATED R AXIS, ECG10: 58 DEGREES
CALCULATED T AXIS, ECG11: 64 DEGREES
CHLORIDE SERPL-SCNC: 100 MMOL/L (ref 98–107)
CO2 SERPL-SCNC: 25 MMOL/L (ref 21–32)
COVID-19 RAPID TEST, COVR: DETECTED
CREAT SERPL-MCNC: 0.9 MG/DL (ref 0.6–1)
CRP SERPL-MCNC: 7.9 MG/DL (ref 0–0.9)
DIAGNOSIS, 93000: NORMAL
DIFFERENTIAL METHOD BLD: ABNORMAL
EOSINOPHIL # BLD: 0 K/UL (ref 0–0.8)
EOSINOPHIL NFR BLD: 0 % (ref 0.5–7.8)
ERYTHROCYTE [DISTWIDTH] IN BLOOD BY AUTOMATED COUNT: 14 % (ref 11.9–14.6)
GLOBULIN SER CALC-MCNC: 4.4 G/DL (ref 2.3–3.5)
GLUCOSE SERPL-MCNC: 86 MG/DL (ref 65–100)
HCT VFR BLD AUTO: 38.8 % (ref 35.8–46.3)
HGB BLD-MCNC: 12.5 G/DL (ref 11.7–15.4)
IMM GRANULOCYTES # BLD AUTO: 0.4 K/UL (ref 0–0.5)
IMM GRANULOCYTES NFR BLD AUTO: 1 % (ref 0–5)
LACTATE SERPL-SCNC: 1.3 MMOL/L (ref 0.4–2)
LACTATE SERPL-SCNC: 1.3 MMOL/L (ref 0.4–2)
LYMPHOCYTES # BLD: 2.2 K/UL (ref 0.5–4.6)
LYMPHOCYTES NFR BLD: 7 % (ref 13–44)
MAGNESIUM SERPL-MCNC: 1.9 MG/DL (ref 1.8–2.4)
MCH RBC QN AUTO: 30.2 PG (ref 26.1–32.9)
MCHC RBC AUTO-ENTMCNC: 32.2 G/DL (ref 31.4–35)
MCV RBC AUTO: 93.7 FL (ref 79.6–97.8)
MONOCYTES # BLD: 1.6 K/UL (ref 0.1–1.3)
MONOCYTES NFR BLD: 5 % (ref 4–12)
NEUTS SEG # BLD: 26.2 K/UL (ref 1.7–8.2)
NEUTS SEG NFR BLD: 86 % (ref 43–78)
NRBC # BLD: 0 K/UL (ref 0–0.2)
P-R INTERVAL, ECG05: 172 MS
PLATELET # BLD AUTO: 297 K/UL (ref 150–450)
PMV BLD AUTO: 11 FL (ref 9.4–12.3)
POTASSIUM SERPL-SCNC: 4.5 MMOL/L (ref 3.5–5.1)
PROCALCITONIN SERPL-MCNC: 1.32 NG/ML (ref 0–0.49)
PROT SERPL-MCNC: 7.7 G/DL (ref 6.3–8.2)
Q-T INTERVAL, ECG07: 362 MS
QRS DURATION, ECG06: 68 MS
QTC CALCULATION (BEZET), ECG08: 407 MS
RBC # BLD AUTO: 4.14 M/UL (ref 4.05–5.2)
SODIUM SERPL-SCNC: 132 MMOL/L (ref 136–145)
SOURCE, COVRS: ABNORMAL
VENTRICULAR RATE, ECG03: 76 BPM
WBC # BLD AUTO: 30.6 K/UL (ref 4.3–11.1)

## 2022-05-20 PROCEDURE — 80053 COMPREHEN METABOLIC PANEL: CPT

## 2022-05-20 PROCEDURE — 87040 BLOOD CULTURE FOR BACTERIA: CPT

## 2022-05-20 PROCEDURE — 85025 COMPLETE CBC W/AUTO DIFF WBC: CPT

## 2022-05-20 PROCEDURE — 74011250637 HC RX REV CODE- 250/637: Performed by: EMERGENCY MEDICINE

## 2022-05-20 PROCEDURE — 96374 THER/PROPH/DIAG INJ IV PUSH: CPT

## 2022-05-20 PROCEDURE — 93005 ELECTROCARDIOGRAM TRACING: CPT | Performed by: EMERGENCY MEDICINE

## 2022-05-20 PROCEDURE — 83605 ASSAY OF LACTIC ACID: CPT

## 2022-05-20 PROCEDURE — 74011250636 HC RX REV CODE- 250/636: Performed by: EMERGENCY MEDICINE

## 2022-05-20 PROCEDURE — 86140 C-REACTIVE PROTEIN: CPT

## 2022-05-20 PROCEDURE — 87635 SARS-COV-2 COVID-19 AMP PRB: CPT

## 2022-05-20 PROCEDURE — 99285 EMERGENCY DEPT VISIT HI MDM: CPT

## 2022-05-20 PROCEDURE — 83735 ASSAY OF MAGNESIUM: CPT

## 2022-05-20 PROCEDURE — 71046 X-RAY EXAM CHEST 2 VIEWS: CPT

## 2022-05-20 PROCEDURE — 9990 CHARGE CONVERSION

## 2022-05-20 PROCEDURE — 94762 N-INVAS EAR/PLS OXIMTRY CONT: CPT

## 2022-05-20 PROCEDURE — 74011000250 HC RX REV CODE- 250: Performed by: EMERGENCY MEDICINE

## 2022-05-20 PROCEDURE — 84145 PROCALCITONIN (PCT): CPT

## 2022-05-20 PROCEDURE — 93005 ELECTROCARDIOGRAM TRACING: CPT

## 2022-05-20 PROCEDURE — 65270000029 HC RM PRIVATE

## 2022-05-20 PROCEDURE — 74011000258 HC RX REV CODE- 258: Performed by: EMERGENCY MEDICINE

## 2022-05-20 RX ORDER — ACETAMINOPHEN 650 MG/1
650 SUPPOSITORY RECTAL
Status: CANCELLED | OUTPATIENT
Start: 2022-05-20

## 2022-05-20 RX ORDER — SODIUM CHLORIDE 0.9 % (FLUSH) 0.9 %
5-10 SYRINGE (ML) INJECTION AS NEEDED
Status: CANCELLED | OUTPATIENT
Start: 2022-05-20

## 2022-05-20 RX ORDER — DEXAMETHASONE SODIUM PHOSPHATE 100 MG/10ML
10 INJECTION INTRAMUSCULAR; INTRAVENOUS
Status: COMPLETED | OUTPATIENT
Start: 2022-05-20 | End: 2022-05-20

## 2022-05-20 RX ORDER — SODIUM CHLORIDE 0.9 % (FLUSH) 0.9 %
5-40 SYRINGE (ML) INJECTION EVERY 8 HOURS
Status: CANCELLED | OUTPATIENT
Start: 2022-05-20

## 2022-05-20 RX ORDER — LEVOTHYROXINE SODIUM 75 UG/1
75 TABLET ORAL DAILY
Status: CANCELLED | OUTPATIENT
Start: 2022-05-21

## 2022-05-20 RX ORDER — SODIUM CHLORIDE 0.9 % (FLUSH) 0.9 %
5-10 SYRINGE (ML) INJECTION EVERY 8 HOURS
Status: DISCONTINUED | OUTPATIENT
Start: 2022-05-20 | End: 2022-05-21 | Stop reason: HOSPADM

## 2022-05-20 RX ORDER — SODIUM CHLORIDE 0.9 % (FLUSH) 0.9 %
5-40 SYRINGE (ML) INJECTION EVERY 8 HOURS
Status: DISCONTINUED | OUTPATIENT
Start: 2022-05-21 | End: 2022-05-24 | Stop reason: HOSPADM

## 2022-05-20 RX ORDER — LANOLIN ALCOHOL/MO/W.PET/CERES
500 CREAM (GRAM) TOPICAL DAILY
Status: CANCELLED | OUTPATIENT
Start: 2022-05-21

## 2022-05-20 RX ORDER — ONDANSETRON 4 MG/1
4 TABLET, ORALLY DISINTEGRATING ORAL
Status: CANCELLED | OUTPATIENT
Start: 2022-05-20

## 2022-05-20 RX ORDER — METOPROLOL SUCCINATE 50 MG/1
50 TABLET, EXTENDED RELEASE ORAL DAILY
Status: CANCELLED | OUTPATIENT
Start: 2022-05-21

## 2022-05-20 RX ORDER — BUDESONIDE AND FORMOTEROL FUMARATE DIHYDRATE 160; 4.5 UG/1; UG/1
2 AEROSOL RESPIRATORY (INHALATION)
Refills: 11 | Status: CANCELLED | OUTPATIENT
Start: 2022-05-20

## 2022-05-20 RX ORDER — PANTOPRAZOLE SODIUM 40 MG/1
40 TABLET, DELAYED RELEASE ORAL
Status: CANCELLED | OUTPATIENT
Start: 2022-05-21

## 2022-05-20 RX ORDER — VENLAFAXINE 75 MG/1
75 TABLET ORAL DAILY
Status: CANCELLED | OUTPATIENT
Start: 2022-05-21

## 2022-05-20 RX ORDER — POLYETHYLENE GLYCOL 3350 17 G/17G
17 POWDER, FOR SOLUTION ORAL DAILY PRN
Status: CANCELLED | OUTPATIENT
Start: 2022-05-20

## 2022-05-20 RX ORDER — ACETAMINOPHEN 325 MG/1
650 TABLET ORAL
Status: CANCELLED | OUTPATIENT
Start: 2022-05-20

## 2022-05-20 RX ORDER — ONDANSETRON 2 MG/ML
4 INJECTION INTRAMUSCULAR; INTRAVENOUS
Status: CANCELLED | OUTPATIENT
Start: 2022-05-20

## 2022-05-20 RX ORDER — SODIUM CHLORIDE 0.9 % (FLUSH) 0.9 %
5-40 SYRINGE (ML) INJECTION AS NEEDED
Status: CANCELLED | OUTPATIENT
Start: 2022-05-20

## 2022-05-20 RX ORDER — FLUTICASONE PROPIONATE 50 MCG
2 SPRAY, SUSPENSION (ML) NASAL DAILY
Status: CANCELLED | OUTPATIENT
Start: 2022-05-21

## 2022-05-20 RX ORDER — ACETAMINOPHEN 500 MG
1000 TABLET ORAL
Status: COMPLETED | OUTPATIENT
Start: 2022-05-20 | End: 2022-05-20

## 2022-05-20 RX ORDER — SODIUM CHLORIDE 0.9 % (FLUSH) 0.9 %
5-10 SYRINGE (ML) INJECTION AS NEEDED
Status: DISCONTINUED | OUTPATIENT
Start: 2022-05-20 | End: 2022-05-21 | Stop reason: HOSPADM

## 2022-05-20 RX ORDER — SODIUM CHLORIDE 0.9 % (FLUSH) 0.9 %
5-40 SYRINGE (ML) INJECTION PRN
Status: DISCONTINUED | OUTPATIENT
Start: 2022-05-21 | End: 2022-05-24 | Stop reason: HOSPADM

## 2022-05-20 RX ADMIN — DEXAMETHASONE SODIUM PHOSPHATE 10 MG: 10 INJECTION INTRAMUSCULAR; INTRAVENOUS at 22:49

## 2022-05-20 RX ADMIN — ACETAMINOPHEN 1000 MG: 500 TABLET ORAL at 20:21

## 2022-05-20 RX ADMIN — SODIUM CHLORIDE 1000 ML: 9 INJECTION, SOLUTION INTRAVENOUS at 20:22

## 2022-05-20 RX ADMIN — AZITHROMYCIN MONOHYDRATE 500 MG: 500 INJECTION, POWDER, LYOPHILIZED, FOR SOLUTION INTRAVENOUS at 20:49

## 2022-05-20 RX ADMIN — CEFEPIME HYDROCHLORIDE 2 G: 2 INJECTION, POWDER, FOR SOLUTION INTRAVENOUS at 20:28

## 2022-05-20 RX ADMIN — SODIUM CHLORIDE, PRESERVATIVE FREE 10 ML: 5 INJECTION INTRAVENOUS at 22:49

## 2022-05-20 NOTE — ED TRIAGE NOTES
Pt arrives ambulatory to triage. Reports aching all over, not feeling well since this AM. Took at home covid test that was negative. Dry cough in triage. Pt states difficulty taking a deep breath. Hx of COPD. NAD. Masked.

## 2022-05-21 PROBLEM — U07.1 LAB TEST POSITIVE FOR DETECTION OF COVID-19 VIRUS: Status: ACTIVE | Noted: 2022-05-20

## 2022-05-21 PROBLEM — J18.9 COMMUNITY ACQUIRED PNEUMONIA: Status: ACTIVE | Noted: 2021-03-29

## 2022-05-21 PROBLEM — R05.9 COUGH: Status: ACTIVE | Noted: 2021-03-29

## 2022-05-21 PROBLEM — A41.9 SEPSIS (HCC): Status: ACTIVE | Noted: 2022-05-20

## 2022-05-21 PROBLEM — I48.91 ATRIAL FIBRILLATION (HCC): Status: ACTIVE | Noted: 2021-03-29

## 2022-05-21 LAB
APPEARANCE UR: CLEAR
BACTERIA URNS QL MICRO: 0 /HPF
BILIRUB UR QL: NEGATIVE
COLOR UR: YELLOW
GLUCOSE UR STRIP.AUTO-MCNC: NEGATIVE MG/DL
HGB UR QL STRIP: NEGATIVE
KETONES UR QL STRIP.AUTO: NEGATIVE MG/DL
LEUKOCYTE ESTERASE UR QL STRIP.AUTO: NEGATIVE
NITRITE UR QL STRIP.AUTO: NEGATIVE
PH UR STRIP: 6 [PH] (ref 5–9)
PROT UR STRIP-MCNC: NEGATIVE MG/DL
SP GR UR REFRACTOMETRY: 1.01 (ref 1–1.02)
UROBILINOGEN UR QL STRIP.AUTO: 0.2 EU/DL (ref 0.2–1)

## 2022-05-21 PROCEDURE — 6370000000 HC RX 637 (ALT 250 FOR IP): Performed by: FAMILY MEDICINE

## 2022-05-21 PROCEDURE — 6360000002 HC RX W HCPCS: Performed by: FAMILY MEDICINE

## 2022-05-21 PROCEDURE — 2580000003 HC RX 258: Performed by: FAMILY MEDICINE

## 2022-05-21 PROCEDURE — 1100000000 HC RM PRIVATE

## 2022-05-21 PROCEDURE — 87086 URINE CULTURE/COLONY COUNT: CPT

## 2022-05-21 PROCEDURE — 3E0333Z INTRODUCTION OF ANTI-INFLAMMATORY INTO PERIPHERAL VEIN, PERCUTANEOUS APPROACH: ICD-10-PCS | Performed by: FAMILY MEDICINE

## 2022-05-21 PROCEDURE — 81003 URINALYSIS AUTO W/O SCOPE: CPT

## 2022-05-21 PROCEDURE — 6370000000 HC RX 637 (ALT 250 FOR IP): Performed by: INTERNAL MEDICINE

## 2022-05-21 PROCEDURE — 2580000003 HC RX 258: Performed by: INTERNAL MEDICINE

## 2022-05-21 RX ORDER — PANTOPRAZOLE SODIUM 40 MG/1
40 TABLET, DELAYED RELEASE ORAL
Status: DISCONTINUED | OUTPATIENT
Start: 2022-05-22 | End: 2022-05-24 | Stop reason: HOSPADM

## 2022-05-21 RX ORDER — METOPROLOL SUCCINATE 50 MG/1
50 TABLET, EXTENDED RELEASE ORAL DAILY
Status: DISCONTINUED | OUTPATIENT
Start: 2022-05-21 | End: 2022-05-24 | Stop reason: HOSPADM

## 2022-05-21 RX ORDER — GUAIFENESIN 600 MG/1
600 TABLET, EXTENDED RELEASE ORAL 2 TIMES DAILY
Status: DISCONTINUED | OUTPATIENT
Start: 2022-05-21 | End: 2022-05-24 | Stop reason: HOSPADM

## 2022-05-21 RX ORDER — VENLAFAXINE HYDROCHLORIDE 75 MG/1
75 CAPSULE, EXTENDED RELEASE ORAL
Status: DISCONTINUED | OUTPATIENT
Start: 2022-05-21 | End: 2022-05-24 | Stop reason: HOSPADM

## 2022-05-21 RX ORDER — DEXAMETHASONE SODIUM PHOSPHATE 4 MG/ML
6 INJECTION, SOLUTION INTRA-ARTICULAR; INTRALESIONAL; INTRAMUSCULAR; INTRAVENOUS; SOFT TISSUE EVERY 24 HOURS
Status: DISCONTINUED | OUTPATIENT
Start: 2022-05-21 | End: 2022-05-24 | Stop reason: HOSPADM

## 2022-05-21 RX ORDER — ACETAMINOPHEN 325 MG/1
650 TABLET ORAL EVERY 4 HOURS PRN
Status: DISCONTINUED | OUTPATIENT
Start: 2022-05-21 | End: 2022-05-24 | Stop reason: HOSPADM

## 2022-05-21 RX ORDER — IBUPROFEN 200 MG
200 TABLET ORAL EVERY 4 HOURS PRN
Status: DISCONTINUED | OUTPATIENT
Start: 2022-05-21 | End: 2022-05-24 | Stop reason: HOSPADM

## 2022-05-21 RX ORDER — SACCHAROMYCES BOULARDII 250 MG
250 CAPSULE ORAL 2 TIMES DAILY
Status: DISCONTINUED | OUTPATIENT
Start: 2022-05-21 | End: 2022-05-24 | Stop reason: HOSPADM

## 2022-05-21 RX ORDER — ALBUTEROL SULFATE 90 UG/1
2 AEROSOL, METERED RESPIRATORY (INHALATION) EVERY 6 HOURS PRN
Status: DISCONTINUED | OUTPATIENT
Start: 2022-05-21 | End: 2022-05-24 | Stop reason: HOSPADM

## 2022-05-21 RX ORDER — LEVOTHYROXINE SODIUM 88 UG/1
88 TABLET ORAL DAILY
Status: DISCONTINUED | OUTPATIENT
Start: 2022-05-21 | End: 2022-05-24 | Stop reason: HOSPADM

## 2022-05-21 RX ADMIN — SODIUM CHLORIDE, PRESERVATIVE FREE 5 ML: 5 INJECTION INTRAVENOUS at 21:45

## 2022-05-21 RX ADMIN — DEXAMETHASONE SODIUM PHOSPHATE 6 MG: 4 INJECTION, SOLUTION INTRAMUSCULAR; INTRAVENOUS at 17:31

## 2022-05-21 RX ADMIN — SODIUM CHLORIDE, PRESERVATIVE FREE 5 ML: 5 INJECTION INTRAVENOUS at 17:35

## 2022-05-21 RX ADMIN — GUAIFENESIN 600 MG: 600 TABLET ORAL at 21:45

## 2022-05-21 RX ADMIN — APIXABAN 5 MG: 5 TABLET, FILM COATED ORAL at 10:52

## 2022-05-21 RX ADMIN — Medication 250 MG: at 10:55

## 2022-05-21 RX ADMIN — Medication 250 MG: at 21:45

## 2022-05-21 RX ADMIN — APIXABAN 5 MG: 5 TABLET, FILM COATED ORAL at 21:45

## 2022-05-21 RX ADMIN — METOPROLOL SUCCINATE 50 MG: 50 TABLET, EXTENDED RELEASE ORAL at 10:55

## 2022-05-21 RX ADMIN — SODIUM CHLORIDE, PRESERVATIVE FREE 10 ML: 5 INJECTION INTRAVENOUS at 05:50

## 2022-05-21 RX ADMIN — SODIUM CHLORIDE, PRESERVATIVE FREE 10 ML: 5 INJECTION INTRAVENOUS at 17:35

## 2022-05-21 RX ADMIN — LEVOTHYROXINE SODIUM 88 MCG: 0.09 TABLET ORAL at 10:55

## 2022-05-21 RX ADMIN — VENLAFAXINE HYDROCHLORIDE 75 MG: 75 CAPSULE, EXTENDED RELEASE ORAL at 10:52

## 2022-05-21 RX ADMIN — SODIUM CHLORIDE, PRESERVATIVE FREE 5 ML: 5 INJECTION INTRAVENOUS at 11:08

## 2022-05-21 RX ADMIN — GUAIFENESIN 600 MG: 600 TABLET ORAL at 10:55

## 2022-05-21 RX ADMIN — ACETAMINOPHEN 650 MG: 325 TABLET ORAL at 17:31

## 2022-05-21 ASSESSMENT — PAIN DESCRIPTION - ORIENTATION: ORIENTATION: MID

## 2022-05-21 ASSESSMENT — PAIN DESCRIPTION - LOCATION: LOCATION: HEAD

## 2022-05-21 ASSESSMENT — PAIN DESCRIPTION - DESCRIPTORS: DESCRIPTORS: ACHING;DULL

## 2022-05-21 ASSESSMENT — PAIN SCALES - GENERAL: PAINLEVEL_OUTOF10: 3

## 2022-05-21 NOTE — PROGRESS NOTES
TRANSFER - IN REPORT:    Verbal report received from Maud Krabbe  on Melva English  being received fromED (unit) for routine progression of care      Report consisted of patients Situation, Background, Assessment and   Recommendations(SBAR). Information from the following report(s) Kardex was reviewed with the receiving nurse. Opportunity for questions and clarification was provided. Assessment completed upon patients arrival to unit and care assumed.

## 2022-05-21 NOTE — ED NOTES
TRANSFER - OUT REPORT:    Verbal report given to [facility] RN on Martina Lezama  being transferred to 8th Floor room 830 for routine progression of care       Report consisted of patients Situation, Background, Assessment and   Recommendations(SBAR). Information from the following report(s) SBAR, ED Summary and MAR was reviewed with the receiving nurse. Lines:   Peripheral IV 05/20/22 Left Antecubital (Active)   Site Assessment Clean, dry, & intact 05/20/22 2007   Phlebitis Assessment 0 05/20/22 2007   Infiltration Assessment 0 05/20/22 2007   Dressing Status Clean, dry, & intact 05/20/22 2007   Dressing Type 4 X 4 05/20/22 2007   Hub Color/Line Status Blue 05/20/22 2007       Peripheral IV 05/20/22 Right Antecubital (Active)        Opportunity for questions and clarification was provided.       Patient transported with:   [employer]

## 2022-05-21 NOTE — PROGRESS NOTES
Hospitalist Progress Note   Admit Date:  2022 11:46 PM   Name:  Robert Pereira   Age:  78 y.o. Sex:  female  :  1945   MRN:  [id number]   Room:  [facility]    Presenting Complaint: No chief complaint on file. Reason(s) for Admission: s     Hospital Course & Interval History: Robert Pereira is a 78 y.o. female with a h/o AFib s/p PPM on Eliquis, hypothyroidism, GERD who presented today with c/o generalized weakness and mylagias that started this morning. She is vaccinated against COVID-19 and has received her booster. Her  had COVID several weeks ago and she has been fine up until today. Had mild nausea w/o vomiting or diarrhea. Some L sided chest pain with inspiration and mild non-productive cough. No fevers, exertional chest pain, SOB/SAM, abdominal pain, peripheral edema or rashes. She is on RA. Labs showed WBCs 30k, procalcitonin 1.32. CXR with LML infiltrate. She is COVID-19 positive. Given antibiotics and dexamethasone. Subjective/24hr Events (22):  C/o cough  Pain on coughing      Assessment & Plan:   # Sepsis 2/2 CAP/COVID-19              - Leukocytosis + fever + infiltrate on CXR and COVID positive              - She is on RA, will not con't dex but will con't abx              - Follow up cultures. - cont cefepime and zithromax  With persistent cough also added dexamethasone     # Atrial fibrillation              - Con't home meds  - cont eliquis     # Hypothyroidism              - Synthroid     # GERD              - PPI     # Bronchiectasis              - Advair outpatient, Symbicort here     # MDD/anxiety              - Effexor     Dispo/Discharge Planning: Home when able, 1-2d pending course. Diet: No diet orders on file  VTE ppx: Eliquis  Code status: DNR, d/w patient at bedside.  is POA.     Active Hospital Problems    Diagnosis Date Noted    Lab test positive for detection of COVID-19 virus [U07.1] 2022    Sepsis ([facility] [A41.9] 05/20/2022    Community acquired pneumonia [J18.9] 03/29/2021    Cough [R05.9] 03/29/2021    Atrial fibrillation (HCC) [I48.91] 03/29/2021    Bronchiectasis ([address] [J47.9] 05/10/2016    Acquired hypothyroidism [E03.9] 05/10/2016    GERD (gastroesophageal reflux disease) [K21.9] 09/12/2013     Objective:   Patient Vitals for the past 24 hrs:   Temp Pulse Resp BP SpO2   05/21/22 1656 -- 61 18 (!) 109/54 99 %   05/21/22 1257 98.6 °F (37 °C) 60 16 (!) 100/51 --   05/21/22 0745 97.6 °F (36.4 °C) 60 16 (!) 94/47 --   05/21/22 0446 98 °F (36.7 °C) 60 14 (!) 99/51 97 %       Estimated body mass index is 23.03 kg/m² as calculated from the following:    Height as of an earlier encounter on 5/20/22: 5' 3\" (1.6 m). Weight as of an earlier encounter on 5/20/22: 130 lb (59 kg). Intake/Output Summary (Last 24 hours) at 5/21/2022 1707  Last data filed at 5/21/2022 1405  Gross per 24 hour   Intake 472 ml   Output --   Net 472 ml         Physical Exam:     Blood pressure (!) 109/54, pulse 61, temperature 98.6 °F (37 °C), temperature source Oral, resp. rate 18, SpO2 99 %. General:    Well nourished. No overt distress. cough  Head:  Normocephalic, atraumatic  Eyes:  Sclerae appear normal.  Pupils equally round. ENT:  Nares appear normal, no drainage. Moist oral mucosa  Neck:  No restricted ROM. Trachea midline   CV:   RRR. No m/r/g. No jugular venous distension. Lungs:   Bilateral coarse breath sounds  Abdomen: Bowel sounds present. Soft, nontender, nondistended. Extremities: No cyanosis or clubbing. No edema  Skin:     No rashes and normal coloration. Warm and dry. Neuro:  CN II-XII grossly intact. Sensation intact. A&Ox3  Psych:  Normal mood and affect.       I have reviewed ordered lab tests and independently visualized imaging below:    Recent Labs:  Recent Results (from the past 48 hour(s))   CBC with Auto Differential    Collection Time: 05/20/22  5:54 PM   Result Value Ref Range    WBC 30.6 (H) 4.3 - 11.1 K/uL    RBC 4.14 4.05 - 5.2 M/uL    Hemoglobin 12.5 11.7 - 15.4 g/dL    Hematocrit 38.8 35.8 - 46.3 %    MCV 93.7 79.6 - 97.8 FL    MCH 30.2 26.1 - 32.9 PG    MCHC 32.2 31.4 - 35.0 g/dL    RDW 14.0 11.9 - 14.6 %    Platelets 157 321 - 996 K/uL    MPV 11.0 9.4 - 12.3 FL    NRBC Absolute 0.00 0.0 - 0.2 K/uL    Differential Type AUTOMATED      Neutrophils % 86 (H) 43 - 78 %    Lymphocytes % 7 (L) 13 - 44 %    Monocytes % 5 4.0 - 12.0 %    Eosinophils % 0 (L) 0.5 - 7.8 %    Basophils % 0 0.0 - 2.0 %    Immature Granulocytes 1 0.0 - 5.0 %    Neutrophils Absolute 26.2 (H) 1.7 - 8.2 K/UL    Lymphocytes Absolute 2.2 0.5 - 4.6 K/UL    Monocytes Absolute 1.6 (H) 0.1 - 1.3 K/UL    Eosinophils Absolute 0.0 0.0 - 0.8 K/UL    Basophils Absolute 0.1 0.0 - 0.2 K/UL    GRANULOCYTE ABSOLUTE COUNT 0.4 0.0 - 0.5 K/UL   Lactic Acid    Collection Time: 05/20/22  5:54 PM   Result Value Ref Range    Lactic Acid 1.3 0.4 - 2.0 MMOL/L   Comprehensive Metabolic Panel    Collection Time: 05/20/22  5:54 PM   Result Value Ref Range    Sodium 132 (L) 136 - 145 mmol/L    Potassium 4.5 3.5 - 5.1 mmol/L    Chloride 100 98 - 107 mmol/L    CO2 25 21 - 32 mmol/L    Anion Gap 7 7 - 16 mmol/L    Glucose 86 65 - 100 mg/dL    BUN 11 8 - 23 MG/DL    CREATININE 0.90 0.6 - 1.0 MG/DL    GFR African American >60 >60 ml/min/1.73m2    EGFR IF NonAfrican American >60 >60 ml/min/1.73m2    Calcium 8.7 8.3 - 10.4 MG/DL    Total Bilirubin 0.8 0.2 - 1.1 MG/DL    ALT 21 12 - 65 U/L    AST 38 (H) 15 - 37 U/L    Alkaline Phosphatase 98 50 - 136 U/L    Total Protein 7.7 6.3 - 8.2 g/dL    Albumin 3.3 3.2 - 4.6 g/dL    Globulin 4.4 (H) 2.3 - 3.5 g/dL    Albumin/Globulin Ratio 0.8 (L) 1.2 - 3.5     Magnesium    Collection Time: 05/20/22  5:54 PM   Result Value Ref Range    Magnesium 1.9 1.8 - 2.4 mg/dL   Lactic Acid    Collection Time: 05/20/22  7:59 PM   Result Value Ref Range    Lactic Acid 1.3 0.4 - 2.0 MMOL/L   COVID-19, Rapid    Collection Time: 05/20/22  8:24 PM Specimen: Nasopharyngeal   Result Value Ref Range    Source Nasopharyngeal      SARS-CoV-2, Rapid Detected (AA) NOTD     Procalcitonin    Collection Time: 05/20/22  9:18 PM   Result Value Ref Range    Procalcitonin 1.32 (H) 0.00 - 0.49 ng/mL   C-Reactive Protein    Collection Time: 05/20/22  9:18 PM   Result Value Ref Range    CRP 7.9 (H) 0.0 - 0.9 mg/dL         Other Studies:  XR CHEST (2 VW)    Result Date: 5/20/2022  CHEST X-RAY, 2 views 5/20/2022 History: Not feeling well since this morning. Chronic cough. Difficulty taking deep breaths. Technique: PA and lateral views of the chest. Comparison: Chest x-ray 4/20/2021 Findings: A stable left sided intracardiac device is seen. The cardiac silhouette is normal in respect to size. The lungs are expanded without evidence for pneumothorax. New focal density is seen in the left midlung field. Although incompletely characterized, this is favored to represent infiltrate given the clinical history provided. Specifically, pneumonia is favored. No significant associated pleural effusion is seen. The bony thorax demonstrates no acute changes. The upper abdomen is unremarkable in appearance. 1.  New focal opacity in the left midlung field favored to represent pneumonia given the clinical history provided. However, a follow-up chest x-ray is recommended following completion of treatment to ensure subsequent resolution. This report was made using voice transcription. Despite my best efforts to avoid any, transcription errors may persist. If there is any question about the accuracy of the report or need for clarification, then please call [phone], or text me through [email or web address] for clarification or correction.        Current Meds:  Current Facility-Administered Medications   Medication Dose Route Frequency    azithromycin (ZITHROMAX) 500 mg in sodium chloride 0.9 % 250 mL IVPB ([id number])  500 mg IntraVENous Q24H    levothyroxine (SYNTHROID) tablet 88 mcg  88 mcg Oral Daily    metoprolol succinate (TOPROL XL) extended release tablet 50 mg  50 mg Oral Daily    [START ON 5/22/2022] pantoprazole (PROTONIX) tablet 40 mg  40 mg Oral QAM AC    venlafaxine (EFFEXOR XR) extended release capsule 75 mg  75 mg Oral Daily with breakfast    mometasone-formoterol (DULERA) 200-5 MCG/ACT inhaler 2 puff  2 puff Inhalation BID    saccharomyces boulardii (FLORASTOR) capsule 250 mg  250 mg Oral BID    guaiFENesin (MUCINEX) extended release tablet 600 mg  600 mg Oral BID    acetaminophen (TYLENOL) tablet 650 mg  650 mg Oral Q4H PRN    albuterol sulfate  (90 Base) MCG/ACT inhaler 2 puff  2 puff Inhalation Q6H PRN    [START ON 5/22/2022] cefepime (MAXIPIME) 1000 mg IVPB minibag in NS  1,000 mg IntraVENous Q12H    dexamethasone (DECADRON) injection 6 mg  6 mg IntraVENous Q24H    ibuprofen (ADVIL;MOTRIN) tablet 200 mg  200 mg Oral Q4H PRN    apixaban (ELIQUIS) tablet 5 mg  5 mg Oral BID    sodium chloride flush 0.9 % injection 5-40 mL  5-40 mL IntraVENous PRN    sodium chloride flush 0.9 % injection 5-40 mL  5-40 mL IntraVENous Q8H       Signed:  Rikki Alas MD

## 2022-05-21 NOTE — ED PROVIDER NOTES
30-year-old female with history of COPD, bronchiectasis presents with complaint of worsening fever, chills, productive cough with white sputum, shortness of breath since earlier today. Reports negative home COVID test earlier today. Denies chest pain, abdominal pain, nausea, vomiting, headache. Reports myalgias. Uncertain any recent sick contacts. Patient is as moderate to severe. The history is provided by the patient. Shortness of Breath  This is a new problem. The problem occurs continuously. The current episode started 12 to 24 hours ago. The problem has been gradually worsening. Associated symptoms include a fever, cough and sputum production. Pertinent negatives include no headaches, no coryza, no rhinorrhea, no sore throat, no swollen glands, no ear pain, no neck pain, no wheezing, no PND, no orthopnea, no chest pain, no vomiting, no abdominal pain, no rash, no leg pain, no leg swelling and no claudication. She has tried nothing for the symptoms. The treatment provided no relief. Associated medical issues include COPD and chronic lung disease. Past Medical History:   Diagnosis Date    Abnormal chest CT February, 2015    The patient had a CT scan that showed infiltrative as well as tree-in-bud changes felt to probably represent atypical mycobacteria infection. However, bronchoscopy with BAL was negative. 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  Graves disease 2010    Thyroidectomy    Ineffective airway clearance     Bronchoscopy: 10/3/11, moderate, thick purulent mucus in the right main bronchus, preliminary positive for Aspergillus flavus.     LLL pneumonia 2010    treated as outpatient    Menopause        Past Surgical History:   Procedure Laterality Date    HX CATARACT REMOVAL Bilateral     HX CHOLECYSTECTOMY  2012    HX HYSTERECTOMY      HX ORTHOPAEDIC      right foot surgery    HX OTHER SURGICAL  February, 2016    Bronchoscopy with BAL    HX SALPINGO-OOPHORECTOMY Bilateral     HX THYROIDECTOMY  2010         Family History:   Problem Relation Age of Onset    Asthma Sister     Breast Cancer Sister 79    Cancer Brother         Prostate    Cancer Brother         Prostate    Deep Vein Thrombosis Father         Secondary to a motor vehicle accident. 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 Social Determinants of Health     Financial Resource Strain:     Difficulty of Paying Living Expenses: Not on file   Food Insecurity:     Worried About Running Out of Food in the Last Year: Not on file    Josh of Food in the Last Year: Not on file   Transportation Needs:     Lack of Transportation (Medical): Not on file    Lack of Transportation (Non-Medical):  Not on file   Physical Activity:     Days of Exercise per Week: Not on file  Minutes of Exercise per Session: Not on file   Stress:     Feeling of Stress : Not on file   Social Connections:     Frequency of Communication with Friends and Family: Not on file    Frequency of Social Gatherings with Friends and Family: Not on file    Attends Zoroastrian Services: Not on file    Active Member of Clubs or Organizations: Not on file    Attends Club or Organization Meetings: Not on file    Marital Status: Not on file   Intimate Partner Violence:     Fear of Current or Ex-Partner: Not on file    Emotionally Abused: Not on file    Physically Abused: Not on file    Sexually Abused: Not on file   Housing Stability:     Unable to Pay for Housing in the Last Year: Not on file    Number of [place] in the Last Year: Not on file    Unstable Housing in the Last Year: Not on file         ALLERGIES: Duloxetine, Augmentin [amoxicillin-pot clavulanate], Sulfa (sulfonamide antibiotics), and Sulfur    Review of Systems   Constitutional: Positive for chills, fatigue and fever. HENT: Negative for ear pain, rhinorrhea and sore throat. Respiratory: Positive for cough, sputum production and shortness of breath. Negative for wheezing. Cardiovascular: Negative for chest pain, orthopnea, claudication, leg swelling and PND. Gastrointestinal: Negative for abdominal pain, diarrhea and vomiting. Genitourinary: Negative for dysuria and flank pain. Musculoskeletal: Positive for myalgias. Negative for arthralgias, back pain and neck pain. Skin: Negative for color change and rash. Neurological: Negative for dizziness, light-headedness and headaches. Hematological: Does not bruise/bleed easily. Psychiatric/Behavioral: Negative for confusion.        Vitals:    05/20/22 1948 05/20/22 1958 05/20/22 2001 05/20/22 2008   BP: (!) 114/43 (!) 93/35  (!) 99/36   Pulse: 78 80  76   Resp: 22 22 21   Temp:   (!) 102.8 °F (39.3 °C)    SpO2: 95% 96%  94%   Weight:       Height:                Physical Exam  Vitals and nursing note reviewed. Constitutional:       Appearance: She is ill-appearing. HENT:      Head: Normocephalic. Mouth/Throat:      Mouth: Mucous membranes are moist.   Eyes:      Extraocular Movements: Extraocular movements intact. Pupils: Pupils are equal, round, and reactive to light. Neck:      Comments: No nuchal rigidity. Cardiovascular:      Rate and Rhythm: Normal rate. Pulses: Normal pulses. Heart sounds: Normal heart sounds. Pulmonary:      Effort: Pulmonary effort is normal.      Breath sounds: Examination of the left-lower field reveals rhonchi. Rhonchi present. Comments: Coarse breath sounds noted to left lung base. Abdominal:      General: Bowel sounds are normal.      Palpations: Abdomen is soft. Tenderness: There is no abdominal tenderness. There is no rebound. Comments: Soft, nontender, nondistended. No rebound or guarding. Musculoskeletal:         General: Normal range of motion. Cervical back: Normal range of motion. Right lower leg: No tenderness. No edema. Left lower leg: No tenderness. No edema. Skin:     General: Skin is warm. Findings: No erythema or rash. Neurological:      General: No focal deficit present. Mental Status: She is alert and oriented to person, place, and time. Motor: No weakness. Comments: No focal deficits. No meningismus. MDM  Number of Diagnoses or Management Options  Leukocytosis, unspecified type: new and requires workup  Pneumonia due to COVID-19 virus: new and requires workup  Pneumonia of left lower lobe due to infectious organism: new and requires workup  Sepsis, due to unspecified organism, unspecified whether acute organ dysfunction present [facility]): new and requires workup  Diagnosis management comments: Patient hypotensive, tachycardic. 30 cc/kg IV fluid bolus ordered. Cefepime, Zithromax ordered for left lower lobe pneumonia.   Rapid COVID-positive. Patient given Tylenol. Will consult hospitalist for admission. Discussed case with  Dr. Bee Horn. Will also give Decadron 10 mg IV given + COVID. Amount and/or Complexity of Data Reviewed  Clinical lab tests: ordered and reviewed  Tests in the radiology section of CPT®: ordered and reviewed  Tests in the medicine section of CPT®: ordered and reviewed  Review and summarize past medical records: yes  Discuss the patient with other providers: yes  Independent visualization of images, tracings, or specimens: yes    Risk of Complications, Morbidity, and/or Mortality  Presenting problems: high  Diagnostic procedures: high  Management options: high    Patient Progress  Patient progress: stable    ED Course as of 05/20/22 2107   Fri May 20, 2022   2018 CXR Findings:   A stable left sided intracardiac device is seen. The cardiac silhouette is  normal in respect to size. The lungs are expanded without evidence for  pneumothorax. New focal density is seen in the left midlung field. Although  incompletely characterized, this is favored to represent infiltrate given the  clinical history provided. Specifically, pneumonia is favored. No significant  associated pleural effusion is seen.     The bony thorax demonstrates no acute changes. The upper abdomen is  unremarkable in appearance.     IMPRESSION  1. New focal opacity in the left midlung field favored to represent pneumonia  given the clinical history provided. However, a follow-up chest x-ray is  recommended following completion of treatment to ensure subsequent resolution.    [DF]   2019 WBC(!): 30.6 [DF]   2106 COVID-19 rapid test(!!): Detected [DF]   2106 COVID-19 rapid test(!!): Detected [DF]      ED Course User Index  [DF] Quan Mary MD       EKG    Date/Time: 5/20/2022 8:20 PM  Performed by: Quan Mary MD  Authorized by:  Quan Mary MD     ECG reviewed by ED Physician in the absence of a cardiologist: yes    Rate:     ECG rate:  76    ECG rate assessment: normal    Rhythm:     Rhythm: sinus rhythm    Ectopy:     Ectopy: none    QRS:     QRS axis:  Normal    QRS intervals:  Normal  Conduction:     Conduction: normal    ST segments:     ST segments:  Normal  T waves:     T waves: normal      Critical Care  Performed by: Anabela Olson MD  Authorized by: Anabela Olson MD     Critical care provider statement:     Critical care time (minutes):  35    Critical care time was exclusive of:  Separately billable procedures and treating other patients    Critical care was necessary to treat or prevent imminent or life-threatening deterioration of the following conditions:  Sepsis and respiratory failure    Critical care was time spent personally by me on the following activities:  Blood draw for specimens, development of treatment plan with patient or surrogate, discussions with consultants, discussions with primary provider, evaluation of patient's response to treatment, examination of patient, obtaining history from patient or surrogate, ordering and performing treatments and interventions, ordering and review of laboratory studies, ordering and review of radiographic studies, pulse oximetry, re-evaluation of patient's condition and review of old charts    I assumed direction of critical care for this patient from another provider in my specialty: no    Comments:      Patient hypotensive, significant leukocytosis with left lower lobe pneumonia and positive COVID-19. Patient requiring multiple IV antibiotics, 30 cc/kg IV fluid bolus                         Titi Lewis MD; 5/20/2022 @9:08 PM Voice dictation software was used during the making of this note. This software is not perfect and grammatical and other typographical errors may be present.   This note has not been proofread for errors.  ===================================================================

## 2022-05-21 NOTE — PROGRESS NOTES
Resting quietly in bed  Appetite good  Occasional dry cough  Ambulatory to bathroom  Tolerating well

## 2022-05-21 NOTE — ACP (ADVANCE CARE PLANNING)
[facility] Hospitalist Service  At the heart of better care     Advance Care Planning   Admit Date:  2022  6:56 PM   Name:  Kale Power   Age:  78 y.o. Sex:  female  :  1945   MRN:  [id number]   Room:  [address] [place] [address] Jennifer Castelan is able to make her own decisions: Yes    If pt unable to make decisions, POA/surrogate decision maker:  Dayo Up () -- [phone]    Other members present in the meeting:   N/A    Patient / surrogate decision-maker directed:  Code Status: DO NOT RESUSCITATE, DO NOT INTUBATE -okay for other aggressive medical and surgical intervention    Other ACP topics discussed, if applicable:   N/A    Patient or surrogate consented to discussion of the current conditions, workup, management plans, prognosis, and understand the risk for further deterioration. Time spent: 16 minutes in direct discussion (face to face and/or over phone).   CPT:  95067: First 16-30 minutes  57459: each additional 30 min (minimum of 46 min)    Signed:  Ese Pickard MD

## 2022-05-21 NOTE — H&P
Hospitalist History and Physical   Admit Date:  2022  6:56 PM   Name:  Jim Alicea   Age:  78 y.o. Sex:  female  :  1945   MRN:  [id number]   Room:  [facility]    Presenting Complaint: Shortness of Breath    Reason(s) for Admission: Sepsis ([facility] [A41.9]  Lab test positive for detection of COVID-19 virus [U07.1]     History of Present Illness: Jim Alicea is a 78 y.o. female with a h/o AFib s/p PPM on Eliquis, hypothyroidism, GERD who presented today with c/o generalized weakness and mylagias that started this morning. She is vaccinated against COVID-19 and has received her booster. Her  had COVID several weeks ago and she has been fine up until today. Had mild nausea w/o vomiting or diarrhea. Some L sided chest pain with inspiration and mild non-productive cough. No fevers, exertional chest pain, SOB/SANTIAGO, abdominal pain, peripheral edema or rashes. She is on RA. Labs showed WBCs 30k, procalcitonin 1.32. CXR with LML infiltrate. She is COVID-19 positive. Given antibiotics and dexamethasone. Review of Systems:  10 systems reviewed and negative except as noted in HPI. Assessment & Plan:   # Sepsis 2/2 CAP/COVID-19   - Leukocytosis + fever + infiltrate on CXR and COVID positive   - She is on RA, will not con't dex but will con't abx   - Follow up cultures. # Atrial fibrillation   - Con't home meds    # Hypothyroidism   - Synthroid    # GERD   - PPI    # Bronchiectasis   - Advair outpatient, Symbicort here    # MDD/anxiety   - Effexor    Dispo/Discharge Planning: Home when able, 1-2d pending course. Diet: No diet orders on file  VTE ppx: Eliquis  Code status: DNR, d/w patient at bedside.  is POA.     Hospital Problems as of 2022 Date Reviewed: 2021          Codes Class Noted - Resolved POA    * (Principal) Sepsis ([facility] ICD-10-CM: A41.9  ICD-9-CM: 038.9, 995.91  2022 - Present Yes        Lab test positive for detection of COVID-19 virus ICD-10-CM: U07.1  ICD-9-CM: 079.89  5/20/2022 - Present Yes        Leukocytosis ICD-10-CM: D72.829  ICD-9-CM: 288.60  3/31/2021 - Present Yes        Community acquired pneumonia ICD-10-CM: J18.9  ICD-9-CM: 695  3/29/2021 - Present Yes        Acquired hypothyroidism (Chronic) ICD-10-CM: E03.9  ICD-9-CM: 244.9  5/10/2016 - Present Yes        Bronchiectasis ([address] (Chronic) ICD-10-CM: J47.9  ICD-9-CM: 494.0  5/10/2016 - Present Yes        GERD (gastroesophageal reflux disease) (Chronic) ICD-10-CM: K21.9  ICD-9-CM: 530.81  9/12/2013 - Present Yes              Past History:  Past Medical History:   Diagnosis Date    Abnormal chest CT February, 2015    The patient had a CT scan that showed infiltrative as well as tree-in-bud changes felt to probably represent atypical mycobacteria infection. However, bronchoscopy with BAL was negative. 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  Graves disease 2010    Thyroidectomy    Ineffective airway clearance     Bronchoscopy: 10/3/11, moderate, thick purulent mucus in the right main bronchus, preliminary positive for Aspergillus flavus.     LLL pneumonia 2010    treated as outpatient    Menopause      Past Surgical History:   Procedure Laterality Date    HX CATARACT REMOVAL Bilateral     HX CHOLECYSTECTOMY  2012    HX HYSTERECTOMY      HX ORTHOPAEDIC      right foot surgery    HX OTHER SURGICAL  February, 2016    Bronchoscopy with BAL    HX SALPINGO-OOPHORECTOMY Bilateral     HX THYROIDECTOMY  2010      Allergies   Allergen Reactions    Duloxetine Unknown (comments) and Other (comments)    Augmentin [Amoxicillin-Pot Clavulanate] Diarrhea    Sulfa (Sulfonamide Antibiotics) Unknown (comments)    Sulfur Hives      Social History     Tobacco Use    Smoking status: Never Smoker    Smokeless tobacco: Never Used   Substance Use Topics  Alcohol use: No     Alcohol/week: 0.0 standard drinks      Family History   Problem Relation Age of Onset    Asthma Sister     Breast Cancer Sister 79    Cancer Brother         Prostate    Cancer Brother         Prostate    Deep Vein Thrombosis Father         Secondary to a motor vehicle accident.  Other Sister         The patient has a twin sister and an older sister both of whom have bronchiectasis with atypical mycobacteria infection    Breast Cancer Maternal Aunt [de-identified]    Breast Cancer Paternal Aunt [de-identified]    Colon Cancer Neg Hx     Ovarian Cancer Neg Hx       Family history reviewed and negative except as noted above.     Immunization History   Administered Date(s) Administered    COVID-19, Pfizer Purple top, DILUTE for use, 12+ yrs, 30mcg/0.3mL dose 01/20/2021, 02/06/2021, 09/27/2021    Influenza High Dose Vaccine PF 10/15/2016, 10/24/2017, 10/01/2018, 10/15/2018    Influenza Vaccine 09/18/2013, 09/18/2013, 10/01/2014, 10/18/2015, 09/18/2016, 10/01/2016, 10/01/2018, 10/15/2018, 10/15/2019, 08/26/2020, 10/01/2021    Influenza Vaccine (Tri) Adjuvanted (>65 Yrs FLUAD TRI 53122) 10/15/2019    Influenza, High-dose, Quadrivalent (>65 Yrs Fluzone High Dose Quad 29799) 08/26/2020    Novel Influenza-H1N1-09, Injectable 10/11/2017    Pneumococcal Conjugate (PCV-13) 12/02/2014    Pneumococcal Polysaccharide (PPSV-23) 01/01/2000, 01/01/2002, 07/20/2015, 10/25/2015, 01/03/2017    Pneumococcal Vaccine (Unspecified Type) 01/01/2000, 10/25/2015, 01/01/2017    TB Skin Test (PPD) 01/01/2005    TB Skin Test (PPD) Intradermal 01/01/2005    Tdap 11/20/2013    Tetanus Toxoid, Adsorbed 07/25/2006    Zoster Recombinant 09/25/2019, 09/26/2019, 12/10/2019     Prior to Admit Medications:  Current Outpatient Medications   Medication Instructions    Calcium Carbonate-Vit D3-Min (CALTRATE 600+D PLUS MINERALS) 600 mg calcium- 400 unit Tab 1 Tablet    cyanocobalamin (VITAMIN B-12) 500 mcg, Oral, DAILY    fluticasone propion-salmeteroL (Advair Diskus) 250-50 mcg/dose diskus inhaler 1 Puff, Inhalation, 2 TIMES DAILY    fluticasone propionate (FLONASE) 50 mcg/actuation nasal spray INSTILL 2 SPRAYS INTO EACH NOSTRIL EVERY DAY    guaiFENesin ER (MUCINEX) 600 mg, Oral, DAILY    levalbuterol (XOPENEX) 0.63 mg, Nebulization, 2 TIMES DAILY, File to Medicare part B--diagnosis J47.9    levalbuterol tartrate (XOPENEX) 45 mcg/actuation inhaler 2 Puffs, Inhalation, EVERY 4 HOURS AS NEEDED    levothyroxine (SYNTHROID) 75 mcg, Oral, DAILY    magnesium 250 mg tab 1 Tablet, DAILY    metoprolol succinate (TOPROL XL) 50 mg, DAILY    pantoprazole (PROTONIX) 40 mg, Oral, DAILY BEFORE DINNER    raloxifene (EVISTA) 60 mg, Oral, DAILY    venlafaxine (EFFEXOR) 75 mg, Oral, DAILY       Objective:     Patient Vitals for the past 24 hrs:   Temp Pulse Resp BP SpO2   05/20/22 2212 100.3 °F (37.9 °C)   (!) 106/43    05/20/22 2209 (!) 101.2 °F (38.4 °C) 83 16 (!) 97/38 93 %   05/20/22 2139 (!) 101.9 °F (38.8 °C)       05/20/22 2132  84 29 (!) 103/45 91 %   05/20/22 2112  83 24 (!) 107/42 93 %   05/20/22 2102  80 24 (!) 109/45 95 %   05/20/22 2042  80 22 (!) 90/42 93 %   05/20/22 2032  79 18 (!) 107/32 95 %   05/20/22 2008  76 21 (!) 99/36 94 %   05/20/22 2001 (!) 102.8 °F (39.3 °C)       05/20/22 1958  80 22 (!) 93/35 96 %   05/20/22 1948  78 22 (!) 114/43 95 %   05/20/22 1928  79 20 (!) 111/39 94 %   05/20/22 1920  79 19 (!) 109/46 93 %   05/20/22 1919     100 %   05/20/22 1900  83 17 (!) 115/102 94 %   05/20/22 1745 99.7 °F (37.6 °C) 78 18 (!) 118/51 100 %     Oxygen Therapy  O2 Sat (%): 93 % (05/20/22 2209)  Pulse via Oximetry: 84 beats per minute (05/20/22 2132)  O2 Device: None (Room air) (05/20/22 1919)    Estimated body mass index is 23.03 kg/m² as calculated from the following:    Height as of this encounter: 5' 3\" (1.6 m). Weight as of this encounter: 59 kg (130 lb).   No intake or output data in the 24 hours ending 05/20/22 2250      Physical Exam:  Blood pressure (!) 106/43, pulse 83, temperature 100.3 °F (37.9 °C), resp. rate 16, height 5' 3\" (1.6 m), weight 59 kg (130 lb), SpO2 93 %, not currently breastfeeding. General:    Well nourished. No overt distress. Head:  Normocephalic, atraumatic  Eyes:  Sclerae appear normal.  Pupils equally round. ENT:  Nares appear normal, no drainage. Moist oral mucosa  Neck:  No restricted ROM. Trachea midline   CV:   RRR. No m/r/g. No jugular venous distension. Lungs:   Rales to L mid to lower lung fields, clear on the R. No wheezes or rhonchi. RA O2. Abdomen: Bowel sounds present. Soft, nontender, nondistended. Extremities: No cyanosis or clubbing. No edema. Skin:     No rashes and normal coloration. Warm and dry. Neuro:  CN II-XII grossly intact. Sensation intact. A&Ox3  Psych:  Normal mood and affect. I have reviewed ordered lab tests and independently visualized imaging below:    Last 24hr Labs:  Recent Results (from the past 24 hour(s))   CBC WITH AUTOMATED DIFF    Collection Time: 05/20/22  5:54 PM   Result Value Ref Range    WBC 30.6 (H) 4.3 - 11.1 K/uL    RBC 4.14 4.05 - 5.2 M/uL    HGB 12.5 11.7 - 15.4 g/dL    HCT 38.8 35.8 - 46.3 %    MCV 93.7 79.6 - 97.8 FL    MCH 30.2 26.1 - 32.9 PG    MCHC 32.2 31.4 - 35.0 g/dL    RDW 14.0 11.9 - 14.6 %    PLATELET 753 792 - 606 K/uL    MPV 11.0 9.4 - 12.3 FL    ABSOLUTE NRBC 0.00 0.0 - 0.2 K/uL    DF AUTOMATED      NEUTROPHILS 86 (H) 43 - 78 %    LYMPHOCYTES 7 (L) 13 - 44 %    MONOCYTES 5 4.0 - 12.0 %    EOSINOPHILS 0 (L) 0.5 - 7.8 %    BASOPHILS 0 0.0 - 2.0 %    IMMATURE GRANULOCYTES 1 0.0 - 5.0 %    ABS. NEUTROPHILS 26.2 (H) 1.7 - 8.2 K/UL    ABS. LYMPHOCYTES 2.2 0.5 - 4.6 K/UL    ABS. MONOCYTES 1.6 (H) 0.1 - 1.3 K/UL    ABS. EOSINOPHILS 0.0 0.0 - 0.8 K/UL    ABS. BASOPHILS 0.1 0.0 - 0.2 K/UL    ABS. IMM. GRANS.  0.4 0.0 - 0.5 K/UL   METABOLIC PANEL, COMPREHENSIVE    Collection Time: 05/20/22  5:54 PM Result Value Ref Range    Sodium 132 (L) 136 - 145 mmol/L    Potassium 4.5 3.5 - 5.1 mmol/L    Chloride 100 98 - 107 mmol/L    CO2 25 21 - 32 mmol/L    Anion gap 7 7 - 16 mmol/L    Glucose 86 65 - 100 mg/dL    BUN 11 8 - 23 MG/DL    Creatinine 0.90 0.6 - 1.0 MG/DL    GFR est AA >60 >60 ml/min/1.73m2    GFR est non-AA >60 >60 ml/min/1.73m2    Calcium 8.7 8.3 - 10.4 MG/DL    Bilirubin, total 0.8 0.2 - 1.1 MG/DL    ALT (SGPT) 21 12 - 65 U/L    AST (SGOT) 38 (H) 15 - 37 U/L    Alk.  phosphatase 98 50 - 136 U/L    Protein, total 7.7 6.3 - 8.2 g/dL    Albumin 3.3 3.2 - 4.6 g/dL    Globulin 4.4 (H) 2.3 - 3.5 g/dL    A-G Ratio 0.8 (L) 1.2 - 3.5     MAGNESIUM    Collection Time: 05/20/22  5:54 PM   Result Value Ref Range    Magnesium 1.9 1.8 - 2.4 mg/dL   LACTIC ACID    Collection Time: 05/20/22  5:54 PM   Result Value Ref Range    Lactic acid 1.3 0.4 - 2.0 MMOL/L   EKG, 12 LEAD, INITIAL    Collection Time: 05/20/22  6:12 PM   Result Value Ref Range    Ventricular Rate 76 BPM    Atrial Rate 76 BPM    P-R Interval 172 ms    QRS Duration 68 ms    Q-T Interval 362 ms    QTC Calculation (Bezet) 407 ms    Calculated P Axis 56 degrees    Calculated R Axis 58 degrees    Calculated T Axis 64 degrees    Diagnosis       Normal sinus rhythm  Nonspecific ST abnormality  Abnormal ECG  When compared with ECG of 12-APR-2021 13:03,  No significant change was found     LACTIC ACID    Collection Time: 05/20/22  7:59 PM   Result Value Ref Range    Lactic acid 1.3 0.4 - 2.0 MMOL/L   COVID-19 RAPID TEST    Collection Time: 05/20/22  8:24 PM   Result Value Ref Range    Specimen source Nasopharyngeal      COVID-19 rapid test Detected (AA) NOTD     PROCALCITONIN    Collection Time: 05/20/22  9:18 PM   Result Value Ref Range    Procalcitonin 1.32 (H) 0.00 - 0.49 ng/mL   C REACTIVE PROTEIN, QT    Collection Time: 05/20/22  9:18 PM   Result Value Ref Range    C-Reactive protein 7.9 (H) 0.0 - 0.9 mg/dL       All Micro Results     Procedure Component Value Units Date/Time    BLOOD CULTURE [[id number]] Collected: 05/20/22 2023    Order Status: Completed Specimen: Blood Updated: 05/20/22 2229    BLOOD CULTURE [[id number]] Collected: 05/20/22 2043    Order Status: Completed Specimen: Blood Updated: 05/20/22 2229    COVID-19 RAPID TEST [[id number]]  (Abnormal) Collected: 05/20/22 2024    Order Status: Completed Specimen: Nasopharyngeal Updated: 05/20/22 2058     Specimen source Nasopharyngeal        COVID-19 rapid test Detected        Comment:      The specimen is POSITIVE for SARS-CoV-2, the novel coronavirus associated with COVID-19. This test has been authorized by the FDA under an Emergency Use Authorization (EUA) for use by authorized laboratories. Fact sheet for Healthcare Providers: ConventionUpdate.co.nz  Fact sheet for Patients: [employer]       Methodology: Isothermal Nucleic Acid Amplification         CULTURE, URINE [[id number]]     Order Status: Sent Specimen: Urine from Clean catch           Other Studies:  XR CHEST PA LAT    Result Date: 5/20/2022  CHEST X-RAY, 2 views 5/20/2022 History: Not feeling well since this morning. Chronic cough. Difficulty taking deep breaths. Technique: PA and lateral views of the chest. Comparison: Chest x-ray 4/20/2021 Findings: A stable left sided intracardiac device is seen. The cardiac silhouette is normal in respect to size. The lungs are expanded without evidence for pneumothorax. New focal density is seen in the left midlung field. Although incompletely characterized, this is favored to represent infiltrate given the clinical history provided. Specifically, pneumonia is favored. No significant associated pleural effusion is seen. The bony thorax demonstrates no acute changes. The upper abdomen is unremarkable in appearance. 1.  New focal opacity in the left midlung field favored to represent pneumonia given the clinical history provided.  However, a follow-up chest x-ray is recommended following completion of treatment to ensure subsequent resolution. This report was made using voice transcription. Despite my best efforts to avoid any, transcription errors may persist. If there is any question about the accuracy of the report or need for clarification, then please call [phone], or text me through perfectserv for clarification or correction. Medications Administered     acetaminophen (TYLENOL) tablet 1,000 mg     Admin Date  05/20/2022 Action  Given Dose  1,000 mg Route  Oral Administered By  Gladys Agrawal RN          azithromycin (ZITHROMAX) 500 mg in 0.9% sodium chloride 250 mL ([id number])     Admin Date  05/20/2022 Action  New Bag Dose  500 mg Rate  250 mL/hr Route  IntraVENous Administered By  Gladys Agrawal RN          cefepime (MAXIPIME) 2 g in 0.9% sodium chloride (MBP/ADV) 100 mL MBP     Admin Date  05/20/2022 Action  New Bag Dose  2 g Rate  200 mL/hr Route  IntraVENous Administered By  Gladys Agrawal RN          dexamethasone (DECADRON) 10 mg/mL injection 10 mg     Admin Date  05/20/2022 Action  Given Dose  10 mg Route  IntraVENous Administered By  Rubin Maria RN          sodium chloride (NS) flush 5-10 mL     Admin Date  05/20/2022 Action  Given Dose  10 mL Route  IntraVENous Administered By  Rubin Maria RN          sodium chloride 0.9 % bolus infusion 1,000 mL     Admin Date  05/20/2022 Action  New Bag Dose  1,000 mL Route  IntraVENous Administered By  Gladys Agrawal RN                Signed:  Qing Ramirez MD    Part of this note may have been written by using a voice dictation software. The note has been proof read but may still contain some grammatical/other typographical errors.

## 2022-05-22 LAB
ANION GAP SERPL CALC-SCNC: 7 MMOL/L (ref 7–16)
BASOPHILS # BLD: 0 K/UL (ref 0–0.2)
BASOPHILS NFR BLD: 0 % (ref 0–2)
BUN SERPL-MCNC: 16 MG/DL (ref 8–23)
CALCIUM SERPL-MCNC: 8.7 MG/DL (ref 8.3–10.4)
CHLORIDE SERPL-SCNC: 110 MMOL/L (ref 98–107)
CO2 SERPL-SCNC: 24 MMOL/L (ref 21–32)
CREAT SERPL-MCNC: 0.7 MG/DL (ref 0.6–1)
DIFFERENTIAL METHOD BLD: ABNORMAL
EOSINOPHIL # BLD: 0 K/UL (ref 0–0.8)
EOSINOPHIL NFR BLD: 0 % (ref 0.5–7.8)
ERYTHROCYTE [DISTWIDTH] IN BLOOD BY AUTOMATED COUNT: 14.2 % (ref 11.9–14.6)
GLUCOSE SERPL-MCNC: 119 MG/DL (ref 65–100)
HCT VFR BLD AUTO: 33.8 % (ref 35.8–46.3)
HGB BLD-MCNC: 11.1 G/DL (ref 11.7–15.4)
IMM GRANULOCYTES # BLD AUTO: 0.2 K/UL (ref 0–0.5)
IMM GRANULOCYTES NFR BLD AUTO: 1 % (ref 0–5)
LYMPHOCYTES # BLD: 1.6 K/UL (ref 0.5–4.6)
LYMPHOCYTES NFR BLD: 7 % (ref 13–44)
MCH RBC QN AUTO: 30.4 PG (ref 26.1–32.9)
MCHC RBC AUTO-ENTMCNC: 32.8 G/DL (ref 31.4–35)
MCV RBC AUTO: 92.6 FL (ref 79.6–97.8)
MONOCYTES # BLD: 0.6 K/UL (ref 0.1–1.3)
MONOCYTES NFR BLD: 3 % (ref 4–12)
NEUTS SEG # BLD: 21.5 K/UL (ref 1.7–8.2)
NEUTS SEG NFR BLD: 89 % (ref 43–78)
NRBC # BLD: 0 K/UL (ref 0–0.2)
PLATELET # BLD AUTO: 269 K/UL (ref 150–450)
PMV BLD AUTO: 11.4 FL (ref 9.4–12.3)
POTASSIUM SERPL-SCNC: 3.6 MMOL/L (ref 3.5–5.1)
RBC # BLD AUTO: 3.65 M/UL (ref 4.05–5.2)
SODIUM SERPL-SCNC: 141 MMOL/L (ref 136–145)
WBC # BLD AUTO: 24 K/UL (ref 4.3–11.1)

## 2022-05-22 PROCEDURE — 1100000000 HC RM PRIVATE

## 2022-05-22 PROCEDURE — 2580000003 HC RX 258: Performed by: FAMILY MEDICINE

## 2022-05-22 PROCEDURE — 36415 COLL VENOUS BLD VENIPUNCTURE: CPT

## 2022-05-22 PROCEDURE — 94760 N-INVAS EAR/PLS OXIMETRY 1: CPT

## 2022-05-22 PROCEDURE — 80048 BASIC METABOLIC PNL TOTAL CA: CPT

## 2022-05-22 PROCEDURE — 94640 AIRWAY INHALATION TREATMENT: CPT

## 2022-05-22 PROCEDURE — 6370000000 HC RX 637 (ALT 250 FOR IP): Performed by: INTERNAL MEDICINE

## 2022-05-22 PROCEDURE — 6360000002 HC RX W HCPCS: Performed by: FAMILY MEDICINE

## 2022-05-22 PROCEDURE — 6370000000 HC RX 637 (ALT 250 FOR IP): Performed by: FAMILY MEDICINE

## 2022-05-22 PROCEDURE — 85025 COMPLETE CBC W/AUTO DIFF WBC: CPT

## 2022-05-22 PROCEDURE — 2580000003 HC RX 258: Performed by: INTERNAL MEDICINE

## 2022-05-22 RX ORDER — CHOLECALCIFEROL (VITAMIN D3) 125 MCG
5 CAPSULE ORAL
COMMUNITY

## 2022-05-22 RX ADMIN — METOPROLOL SUCCINATE 50 MG: 50 TABLET, EXTENDED RELEASE ORAL at 08:46

## 2022-05-22 RX ADMIN — AZITHROMYCIN MONOHYDRATE 500 MG: 500 INJECTION, POWDER, LYOPHILIZED, FOR SOLUTION INTRAVENOUS at 10:06

## 2022-05-22 RX ADMIN — Medication 250 MG: at 22:55

## 2022-05-22 RX ADMIN — Medication 250 MG: at 08:46

## 2022-05-22 RX ADMIN — VENLAFAXINE HYDROCHLORIDE 75 MG: 75 CAPSULE, EXTENDED RELEASE ORAL at 08:46

## 2022-05-22 RX ADMIN — GUAIFENESIN 600 MG: 600 TABLET ORAL at 08:46

## 2022-05-22 RX ADMIN — PANTOPRAZOLE SODIUM 40 MG: 40 TABLET, DELAYED RELEASE ORAL at 06:40

## 2022-05-22 RX ADMIN — GUAIFENESIN 600 MG: 600 TABLET ORAL at 22:55

## 2022-05-22 RX ADMIN — CEFEPIME HYDROCHLORIDE 1000 MG: 1 INJECTION, POWDER, FOR SOLUTION INTRAMUSCULAR; INTRAVENOUS at 16:55

## 2022-05-22 RX ADMIN — APIXABAN 5 MG: 5 TABLET, FILM COATED ORAL at 08:46

## 2022-05-22 RX ADMIN — MOMETASONE FUROATE AND FORMOTEROL FUMARATE DIHYDRATE 2 PUFF: 200; 5 AEROSOL RESPIRATORY (INHALATION) at 20:51

## 2022-05-22 RX ADMIN — LEVOTHYROXINE SODIUM 88 MCG: 0.09 TABLET ORAL at 06:40

## 2022-05-22 RX ADMIN — SODIUM CHLORIDE, PRESERVATIVE FREE 10 ML: 5 INJECTION INTRAVENOUS at 06:40

## 2022-05-22 RX ADMIN — SODIUM CHLORIDE, PRESERVATIVE FREE 10 ML: 5 INJECTION INTRAVENOUS at 16:55

## 2022-05-22 RX ADMIN — MOMETASONE FUROATE AND FORMOTEROL FUMARATE DIHYDRATE 2 PUFF: 200; 5 AEROSOL RESPIRATORY (INHALATION) at 09:45

## 2022-05-22 RX ADMIN — DEXAMETHASONE SODIUM PHOSPHATE 6 MG: 4 INJECTION, SOLUTION INTRAMUSCULAR; INTRAVENOUS at 16:55

## 2022-05-22 RX ADMIN — CEFEPIME HYDROCHLORIDE 1000 MG: 1 INJECTION, POWDER, FOR SOLUTION INTRAMUSCULAR; INTRAVENOUS at 03:58

## 2022-05-22 RX ADMIN — SODIUM CHLORIDE, PRESERVATIVE FREE 10 ML: 5 INJECTION INTRAVENOUS at 22:55

## 2022-05-22 RX ADMIN — SODIUM CHLORIDE, PRESERVATIVE FREE 10 ML: 5 INJECTION INTRAVENOUS at 14:37

## 2022-05-22 RX ADMIN — APIXABAN 5 MG: 5 TABLET, FILM COATED ORAL at 22:55

## 2022-05-22 ASSESSMENT — PAIN SCALES - GENERAL: PAINLEVEL_OUTOF10: 0

## 2022-05-22 NOTE — PROGRESS NOTES
Patient resting in bed, alert and oriented, cooperative with care. Patient on RA. Patient denies pain or distress, safety measures in place, call light within reach.

## 2022-05-22 NOTE — PROGRESS NOTES
Hospitalist Progress Note   Admit Date:  2022 11:46 PM   Name:  Aruna León   Age:  78 y.o. Sex:  female  :  1945   MRN:  [id number]   Room:  [facility]    Presenting Complaint: No chief complaint on file. Reason(s) for Admission: s     Hospital Course & Interval History: Aruna León is a 78 y.o. female with a h/o AFib s/p PPM on Eliquis, hypothyroidism, GERD who presented today with c/o generalized weakness and mylagias that started this morning. She is vaccinated against COVID-19 and has received her booster. Her  had COVID several weeks ago and she has been fine up until today. Had mild nausea w/o vomiting or diarrhea. Some L sided chest pain with inspiration and mild non-productive cough. No fevers, exertional chest pain, SOB/SAM, abdominal pain, peripheral edema or rashes. She is on RA. Labs showed WBCs 30k, procalcitonin 1.32. CXR with LML infiltrate. She is COVID-19 positive. Given antibiotics and dexamethasone. Subjective/24hr Events (22):    C/o cough  Pain on coughing      Still  Has cough  Chest pain while coughing is almost gone  Improving wbc    Assessment & Plan:   # Sepsis 2/2 CAP/COVID-19              - Leukocytosis + fever + infiltrate on CXR and COVID positive              - She is on RA, will not con't dex but will con't abx              - Follow up cultures. - cont cefepime and zithromax  With persistent cough also added dexamethasone  - almost resolved chest pain secondary to cough, improving wbc     # Atrial fibrillation              - Con't home meds  - cont eliquis     # Hypothyroidism              - Synthroid     # GERD              - PPI     # Bronchiectasis              - Advair outpatient, Symbicort here     # MDD/anxiety              - Effexor     Dispo/Discharge Planning: Home when able, 1-2d pending course. Diet: No diet orders on file  VTE ppx: Eliquis  Code status: DNR, d/w patient at bedside.   is Doris 71 Problems    Diagnosis Date Noted    Lab test positive for detection of COVID-19 virus [U07.1] 05/20/2022    Sepsis ([facility] [A41.9] 05/20/2022    Community acquired pneumonia [J18.9] 03/29/2021    Cough [R05.9] 03/29/2021    Atrial fibrillation (HCC) [I48.91] 03/29/2021    Bronchiectasis ([facility] [J47.9] 05/10/2016    Acquired hypothyroidism [E03.9] 05/10/2016    GERD (gastroesophageal reflux disease) [K21.9] 09/12/2013     Objective:     Patient Vitals for the past 24 hrs:   Temp Pulse Resp BP SpO2   05/22/22 1146 98.2 °F (36.8 °C) 62 16 (!) 116/57 97 %   05/22/22 0945 -- -- 16 -- 96 %   05/22/22 0811 98.2 °F (36.8 °C) 74 16 (!) 105/41 98 %   05/22/22 0432 98.2 °F (36.8 °C) 60 19 (!) 112/56 94 %   05/22/22 0007 98.1 °F (36.7 °C) 69 19 (!) 119/58 96 %   05/21/22 1950 98.2 °F (36.8 °C) 66 19 (!) 113/48 96 %   05/21/22 1656 98.3 °F (36.8 °C) 61 18 (!) 109/54 99 %       Estimated body mass index is 23.6 kg/m² as calculated from the following:    Height as of this encounter: 5' 3\" (1.6 m). Weight as of this encounter: 133 lb 3.2 oz (60.4 kg). Intake/Output Summary (Last 24 hours) at 5/22/2022 1451  Last data filed at 5/22/2022 1038  Gross per 24 hour   Intake 354 ml   Output --   Net 354 ml         Physical Exam:     Blood pressure (!) 116/57, pulse 62, temperature 98.2 °F (36.8 °C), temperature source Oral, resp. rate 16, height 5' 3\" (1.6 m), weight 133 lb 3.2 oz (60.4 kg), SpO2 97 %. General:    Well nourished. No overt distress. Head:  Normocephalic, atraumatic  Eyes:  Sclerae appear normal.  Pupils equally round. ENT:  Nares appear normal, no drainage. Moist oral mucosa  Neck:  No restricted ROM. Trachea midline   CV:   RRR. No m/r/g. No jugular venous distension. Lungs:   Bilateral coarse breath sounds  Abdomen: Bowel sounds present. Soft, nontender, nondistended. Extremities: No cyanosis or clubbing. No edema  Skin:     No rashes and normal coloration.    Warm and dry.    Neuro:  CN II-XII grossly intact. Sensation intact. A&Ox3  Psych:  Normal mood and affect.       I have reviewed ordered lab tests and independently visualized imaging below:    Recent Labs:  Recent Results (from the past 48 hour(s))   CBC with Auto Differential    Collection Time: 05/20/22  5:54 PM   Result Value Ref Range    WBC 30.6 (H) 4.3 - 11.1 K/uL    RBC 4.14 4.05 - 5.2 M/uL    Hemoglobin 12.5 11.7 - 15.4 g/dL    Hematocrit 38.8 35.8 - 46.3 %    MCV 93.7 79.6 - 97.8 FL    MCH 30.2 26.1 - 32.9 PG    MCHC 32.2 31.4 - 35.0 g/dL    RDW 14.0 11.9 - 14.6 %    Platelets 878 617 - 437 K/uL    MPV 11.0 9.4 - 12.3 FL    NRBC Absolute 0.00 0.0 - 0.2 K/uL    Differential Type AUTOMATED      Neutrophils % 86 (H) 43 - 78 %    Lymphocytes % 7 (L) 13 - 44 %    Monocytes % 5 4.0 - 12.0 %    Eosinophils % 0 (L) 0.5 - 7.8 %    Basophils % 0 0.0 - 2.0 %    Immature Granulocytes 1 0.0 - 5.0 %    Neutrophils Absolute 26.2 (H) 1.7 - 8.2 K/UL    Lymphocytes Absolute 2.2 0.5 - 4.6 K/UL    Monocytes Absolute 1.6 (H) 0.1 - 1.3 K/UL    Eosinophils Absolute 0.0 0.0 - 0.8 K/UL    Basophils Absolute 0.1 0.0 - 0.2 K/UL    GRANULOCYTE ABSOLUTE COUNT 0.4 0.0 - 0.5 K/UL   Lactic Acid    Collection Time: 05/20/22  5:54 PM   Result Value Ref Range    Lactic Acid 1.3 0.4 - 2.0 MMOL/L   Comprehensive Metabolic Panel    Collection Time: 05/20/22  5:54 PM   Result Value Ref Range    Sodium 132 (L) 136 - 145 mmol/L    Potassium 4.5 3.5 - 5.1 mmol/L    Chloride 100 98 - 107 mmol/L    CO2 25 21 - 32 mmol/L    Anion Gap 7 7 - 16 mmol/L    Glucose 86 65 - 100 mg/dL    BUN 11 8 - 23 MG/DL    CREATININE 0.90 0.6 - 1.0 MG/DL    GFR African American >60 >60 ml/min/1.73m2    EGFR IF NonAfrican American >60 >60 ml/min/1.73m2    Calcium 8.7 8.3 - 10.4 MG/DL    Total Bilirubin 0.8 0.2 - 1.1 MG/DL    ALT 21 12 - 65 U/L    AST 38 (H) 15 - 37 U/L    Alkaline Phosphatase 98 50 - 136 U/L    Total Protein 7.7 6.3 - 8.2 g/dL    Albumin 3.3 3.2 - 4.6 g/dL Globulin 4.4 (H) 2.3 - 3.5 g/dL    Albumin/Globulin Ratio 0.8 (L) 1.2 - 3.5     Magnesium    Collection Time: 05/20/22  5:54 PM   Result Value Ref Range    Magnesium 1.9 1.8 - 2.4 mg/dL   Lactic Acid    Collection Time: 05/20/22  7:59 PM   Result Value Ref Range    Lactic Acid 1.3 0.4 - 2.0 MMOL/L   COVID-19, Rapid    Collection Time: 05/20/22  8:24 PM    Specimen: Nasopharyngeal   Result Value Ref Range    Source Nasopharyngeal      SARS-CoV-2, Rapid Detected (AA) NOTD     Procalcitonin    Collection Time: 05/20/22  9:18 PM   Result Value Ref Range    Procalcitonin 1.32 (H) 0.00 - 0.49 ng/mL   C-Reactive Protein    Collection Time: 05/20/22  9:18 PM   Result Value Ref Range    CRP 7.9 (H) 0.0 - 0.9 mg/dL   CBC with Auto Differential    Collection Time: 05/22/22  3:39 AM   Result Value Ref Range    WBC 24.0 (H) 4.3 - 11.1 K/uL    RBC 3.65 (L) 4.05 - 5.2 M/uL    Hemoglobin 11.1 (L) 11.7 - 15.4 g/dL    Hematocrit 33.8 (L) 35.8 - 46.3 %    MCV 92.6 79.6 - 97.8 FL    MCH 30.4 26.1 - 32.9 PG    MCHC 32.8 31.4 - 35.0 g/dL    RDW 14.2 11.9 - 14.6 %    Platelets 413 863 - 089 K/uL    MPV 11.4 9.4 - 12.3 FL    nRBC 0.00 0.0 - 0.2 K/uL    Differential Type AUTOMATED      Seg Neutrophils 89 (H) 43 - 78 %    Lymphocytes 7 (L) 13 - 44 %    Monocytes 3 (L) 4.0 - 12.0 %    Eosinophils % 0 (L) 0.5 - 7.8 %    Basophils 0 0.0 - 2.0 %    Immature Granulocytes 1 0.0 - 5.0 %    Segs Absolute 21.5 (H) 1.7 - 8.2 K/UL    Absolute Lymph # 1.6 0.5 - 4.6 K/UL    Absolute Mono # 0.6 0.1 - 1.3 K/UL    Absolute Eos # 0.0 0.0 - 0.8 K/UL    Basophils Absolute 0.0 0.0 - 0.2 K/UL    Absolute Immature Granulocyte 0.2 0.0 - 0.5 K/UL   Basic Metabolic Panel    Collection Time: 05/22/22  3:39 AM   Result Value Ref Range    Sodium 141 136 - 145 mmol/L    Potassium 3.6 3.5 - 5.1 mmol/L    Chloride 110 (H) 98 - 107 mmol/L    CO2 24 21 - 32 mmol/L    Anion Gap 7 7 - 16 mmol/L    Glucose 119 (H) 65 - 100 mg/dL    BUN 16 8 - 23 MG/DL    CREATININE 0.70 0.6 - 1.0 MG/DL    GFR African American >60 >60 ml/min/1.73m2    GFR Non- >60 >60 ml/min/1.73m2    Calcium 8.7 8.3 - 10.4 MG/DL         Other Studies:  XR CHEST (2 VW)    Result Date: 5/20/2022  CHEST X-RAY, 2 views 5/20/2022 History: Not feeling well since this morning. Chronic cough. Difficulty taking deep breaths. Technique: PA and lateral views of the chest. Comparison: Chest x-ray 4/20/2021 Findings: A stable left sided intracardiac device is seen. The cardiac silhouette is normal in respect to size. The lungs are expanded without evidence for pneumothorax. New focal density is seen in the left midlung field. Although incompletely characterized, this is favored to represent infiltrate given the clinical history provided. Specifically, pneumonia is favored. No significant associated pleural effusion is seen. The bony thorax demonstrates no acute changes. The upper abdomen is unremarkable in appearance. 1.  New focal opacity in the left midlung field favored to represent pneumonia given the clinical history provided. However, a follow-up chest x-ray is recommended following completion of treatment to ensure subsequent resolution. This report was made using voice transcription. Despite my best efforts to avoid any, transcription errors may persist. If there is any question about the accuracy of the report or need for clarification, then please call [phone], or text me through [email or web address] for clarification or correction.        Current Meds:  Current Facility-Administered Medications   Medication Dose Route Frequency    azithromycin (ZITHROMAX) 500 mg in sodium chloride 0.9 % 250 mL IVPB ([id number])  500 mg IntraVENous Q24H    levothyroxine (SYNTHROID) tablet 88 mcg  88 mcg Oral Daily    metoprolol succinate (TOPROL XL) extended release tablet 50 mg  50 mg Oral Daily    pantoprazole (PROTONIX) tablet 40 mg  40 mg Oral QAM AC    venlafaxine (EFFEXOR XR) extended release capsule 75 mg  75 mg Oral Daily with breakfast    mometasone-formoterol (DULERA) 200-5 MCG/ACT inhaler 2 puff  2 puff Inhalation BID    saccharomyces boulardii (FLORASTOR) capsule 250 mg  250 mg Oral BID    guaiFENesin (MUCINEX) extended release tablet 600 mg  600 mg Oral BID    acetaminophen (TYLENOL) tablet 650 mg  650 mg Oral Q4H PRN    albuterol sulfate  (90 Base) MCG/ACT inhaler 2 puff  2 puff Inhalation Q6H PRN    cefepime (MAXIPIME) 1000 mg IVPB minibag in NS  1,000 mg IntraVENous Q12H    dexamethasone (DECADRON) injection 6 mg  6 mg IntraVENous Q24H    ibuprofen (ADVIL;MOTRIN) tablet 200 mg  200 mg Oral Q4H PRN    apixaban (ELIQUIS) tablet 5 mg  5 mg Oral BID    sodium chloride flush 0.9 % injection 5-40 mL  5-40 mL IntraVENous PRN    sodium chloride flush 0.9 % injection 5-40 mL  5-40 mL IntraVENous Q8H       Signed:  José Agarwal MD

## 2022-05-23 LAB
ANION GAP SERPL CALC-SCNC: 6 MMOL/L (ref 7–16)
BACTERIA SPEC CULT: NORMAL
BASOPHILS # BLD: 0 K/UL (ref 0–0.2)
BASOPHILS NFR BLD: 0 % (ref 0–2)
BUN SERPL-MCNC: 16 MG/DL (ref 8–23)
CALCIUM SERPL-MCNC: 8.6 MG/DL (ref 8.3–10.4)
CHLORIDE SERPL-SCNC: 110 MMOL/L (ref 98–107)
CO2 SERPL-SCNC: 26 MMOL/L (ref 21–32)
CREAT SERPL-MCNC: 0.8 MG/DL (ref 0.6–1)
DIFFERENTIAL METHOD BLD: ABNORMAL
EOSINOPHIL # BLD: 0 K/UL (ref 0–0.8)
EOSINOPHIL NFR BLD: 0 % (ref 0.5–7.8)
ERYTHROCYTE [DISTWIDTH] IN BLOOD BY AUTOMATED COUNT: 14.6 % (ref 11.9–14.6)
GLUCOSE SERPL-MCNC: 103 MG/DL (ref 65–100)
HCT VFR BLD AUTO: 34.8 % (ref 35.8–46.3)
HGB BLD-MCNC: 11.4 G/DL (ref 11.7–15.4)
IMM GRANULOCYTES # BLD AUTO: 0.2 K/UL (ref 0–0.5)
IMM GRANULOCYTES NFR BLD AUTO: 1 % (ref 0–5)
LYMPHOCYTES # BLD: 2 K/UL (ref 0.5–4.6)
LYMPHOCYTES NFR BLD: 12 % (ref 13–44)
MCH RBC QN AUTO: 30.1 PG (ref 26.1–32.9)
MCHC RBC AUTO-ENTMCNC: 32.8 G/DL (ref 31.4–35)
MCV RBC AUTO: 91.8 FL (ref 79.6–97.8)
MONOCYTES # BLD: 0.4 K/UL (ref 0.1–1.3)
MONOCYTES NFR BLD: 3 % (ref 4–12)
NEUTS SEG # BLD: 14 K/UL (ref 1.7–8.2)
NEUTS SEG NFR BLD: 84 % (ref 43–78)
NRBC # BLD: 0 K/UL (ref 0–0.2)
PLATELET # BLD AUTO: 349 K/UL (ref 150–450)
PMV BLD AUTO: 11.7 FL (ref 9.4–12.3)
POTASSIUM SERPL-SCNC: 4.5 MMOL/L (ref 3.5–5.1)
RBC # BLD AUTO: 3.79 M/UL (ref 4.05–5.2)
SERVICE CMNT-IMP: NORMAL
SODIUM SERPL-SCNC: 142 MMOL/L (ref 136–145)
WBC # BLD AUTO: 16.6 K/UL (ref 4.3–11.1)

## 2022-05-23 PROCEDURE — 2580000003 HC RX 258: Performed by: INTERNAL MEDICINE

## 2022-05-23 PROCEDURE — 2580000003 HC RX 258: Performed by: FAMILY MEDICINE

## 2022-05-23 PROCEDURE — 6360000002 HC RX W HCPCS: Performed by: FAMILY MEDICINE

## 2022-05-23 PROCEDURE — 6370000000 HC RX 637 (ALT 250 FOR IP): Performed by: FAMILY MEDICINE

## 2022-05-23 PROCEDURE — 36415 COLL VENOUS BLD VENIPUNCTURE: CPT

## 2022-05-23 PROCEDURE — 80048 BASIC METABOLIC PNL TOTAL CA: CPT

## 2022-05-23 PROCEDURE — 1100000000 HC RM PRIVATE

## 2022-05-23 PROCEDURE — 6370000000 HC RX 637 (ALT 250 FOR IP): Performed by: INTERNAL MEDICINE

## 2022-05-23 PROCEDURE — 85025 COMPLETE CBC W/AUTO DIFF WBC: CPT

## 2022-05-23 RX ADMIN — Medication 250 MG: at 08:19

## 2022-05-23 RX ADMIN — LEVOTHYROXINE SODIUM 88 MCG: 0.09 TABLET ORAL at 06:44

## 2022-05-23 RX ADMIN — SODIUM CHLORIDE, PRESERVATIVE FREE 10 ML: 5 INJECTION INTRAVENOUS at 05:05

## 2022-05-23 RX ADMIN — VENLAFAXINE HYDROCHLORIDE 75 MG: 75 CAPSULE, EXTENDED RELEASE ORAL at 08:19

## 2022-05-23 RX ADMIN — APIXABAN 5 MG: 5 TABLET, FILM COATED ORAL at 21:51

## 2022-05-23 RX ADMIN — METOPROLOL SUCCINATE 50 MG: 50 TABLET, EXTENDED RELEASE ORAL at 08:19

## 2022-05-23 RX ADMIN — SODIUM CHLORIDE, PRESERVATIVE FREE 5 ML: 5 INJECTION INTRAVENOUS at 12:15

## 2022-05-23 RX ADMIN — DEXAMETHASONE SODIUM PHOSPHATE 6 MG: 4 INJECTION, SOLUTION INTRAMUSCULAR; INTRAVENOUS at 16:49

## 2022-05-23 RX ADMIN — MOMETASONE FUROATE AND FORMOTEROL FUMARATE DIHYDRATE 2 PUFF: 200; 5 AEROSOL RESPIRATORY (INHALATION) at 21:08

## 2022-05-23 RX ADMIN — GUAIFENESIN 600 MG: 600 TABLET ORAL at 08:19

## 2022-05-23 RX ADMIN — SODIUM CHLORIDE, PRESERVATIVE FREE 10 ML: 5 INJECTION INTRAVENOUS at 21:52

## 2022-05-23 RX ADMIN — CEFEPIME HYDROCHLORIDE 1000 MG: 1 INJECTION, POWDER, FOR SOLUTION INTRAMUSCULAR; INTRAVENOUS at 16:47

## 2022-05-23 RX ADMIN — CEFEPIME HYDROCHLORIDE 1000 MG: 1 INJECTION, POWDER, FOR SOLUTION INTRAMUSCULAR; INTRAVENOUS at 04:42

## 2022-05-23 RX ADMIN — Medication 250 MG: at 21:51

## 2022-05-23 RX ADMIN — MOMETASONE FUROATE AND FORMOTEROL FUMARATE DIHYDRATE 2 PUFF: 200; 5 AEROSOL RESPIRATORY (INHALATION) at 08:00

## 2022-05-23 RX ADMIN — APIXABAN 5 MG: 5 TABLET, FILM COATED ORAL at 08:19

## 2022-05-23 RX ADMIN — AZITHROMYCIN MONOHYDRATE 500 MG: 500 INJECTION, POWDER, LYOPHILIZED, FOR SOLUTION INTRAVENOUS at 10:17

## 2022-05-23 RX ADMIN — GUAIFENESIN 600 MG: 600 TABLET ORAL at 21:51

## 2022-05-23 RX ADMIN — PANTOPRAZOLE SODIUM 40 MG: 40 TABLET, DELAYED RELEASE ORAL at 06:44

## 2022-05-23 ASSESSMENT — PAIN SCALES - GENERAL
PAINLEVEL_OUTOF10: 0
PAINLEVEL_OUTOF10: 0

## 2022-05-23 NOTE — PROGRESS NOTES
Hospitalist Progress Note   Admit Date:  2022 11:46 PM   Name:  Nikita Corrigan   Age:  78 y.o. Sex:  female  :  1945   MRN:  [id number]   Room:  0/    Presenting Complaint: No chief complaint on file. Reason(s) for Admission: s     Hospital Course & Interval History: Nikita Corrigan is a 78 y.o. female with a h/o AFib s/p PPM on Eliquis, hypothyroidism, GERD who presented today with c/o generalized weakness and mylagias that started this morning. She is vaccinated against COVID-19 and has received her booster. Her  had COVID several weeks ago and she has been fine up until today. Had mild nausea w/o vomiting or diarrhea. Some L sided chest pain with inspiration and mild non-productive cough. No fevers, exertional chest pain, SOB/SAM, abdominal pain, peripheral edema or rashes. She is on RA. Labs showed WBCs 30k, procalcitonin 1.32. CXR with LML infiltrate. She is COVID-19 positive. Given antibiotics and dexamethasone. Subjective/24hr Events (22):    C/o cough  Pain on coughing      Still  Has cough  Chest pain while coughing is almost gone  Improving wbc      C/o cough  Improving wbc    Assessment & Plan:   # Sepsis 2/2 CAP/COVID-19              - Leukocytosis + fever + infiltrate on CXR and COVID positive              - She is on RA, will not con't dex but will con't abx              - Follow up cultures. - cont cefepime and zithromax  With persistent cough also added dexamethasone  - almost resolved chest pain secondary to cough, improving wbc  - cont antibiotics     # Atrial fibrillation              - Con't home meds  - cont eliquis     # Hypothyroidism              - Synthroid     # GERD              - PPI     # Bronchiectasis              - Advair outpatient, Symbicort here     # MDD/anxiety              - Effexor     Dispo/Discharge Planning: Home when able, 1-2d pending course.   VTE ppx: Eliquis  Code status: DNR, d/w patient at bedside.  is POA. Active Hospital Problems    Diagnosis Date Noted    Lab test positive for detection of COVID-19 virus [U07.1] 05/20/2022    Sepsis ([facility] [A41.9] 05/20/2022    Community acquired pneumonia [J18.9] 03/29/2021    Cough [R05.9] 03/29/2021    Atrial fibrillation (HCC) [I48.91] 03/29/2021    [facility] ([facility] [J47.9] 05/10/2016    Acquired hypothyroidism [E03.9] 05/10/2016    GERD (gastroesophageal reflux disease) [K21.9] 09/12/2013     Objective:     Patient Vitals for the past 24 hrs:   Temp Pulse Resp BP SpO2   05/23/22 1110 97.9 °F (36.6 °C) 59 18 111/60 100 %   05/23/22 0721 97.3 °F (36.3 °C) 59 18 125/65 94 %   05/23/22 0236 97.7 °F (36.5 °C) 60 18 (!) 118/59 94 %   05/22/22 2247 97.9 °F (36.6 °C) 67 19 126/61 95 %   05/22/22 2052 -- 77 18 -- 98 %   05/22/22 2017 -- 63 -- 117/62 95 %   05/22/22 1703 98.2 °F (36.8 °C) 70 16 (!) 107/46 98 %       Estimated body mass index is 23.6 kg/m² as calculated from the following:    Height as of this encounter: 5' 3\" (1.6 m). Weight as of this encounter: 133 lb 3.2 oz (60.4 kg). Intake/Output Summary (Last 24 hours) at 5/23/2022 1438  Last data filed at 5/23/2022 1403  Gross per 24 hour   Intake 657 ml   Output --   Net 657 ml         Physical Exam:     Blood pressure 111/60, pulse 59, temperature 97.9 °F (36.6 °C), temperature source Oral, resp. rate 18, height 5' 3\" (1.6 m), weight 133 lb 3.2 oz (60.4 kg), SpO2 100 %. General:    Well nourished. No overt distress. Intermittent cough  Head:  Normocephalic, atraumatic  Eyes:  Sclerae appear normal.  Pupils equally round. ENT:  Nares appear normal, no drainage. Moist oral mucosa  Neck:  No restricted ROM. Trachea midline   CV:   RRR. No m/r/g. No jugular venous distension. Lungs:   Bilateral coarse breath sounds  Abdomen: Bowel sounds present. Soft, nontender, nondistended. Extremities: No cyanosis or clubbing.   No edema  Skin:     No rashes and normal coloration. Warm and dry. Neuro:  CN II-XII grossly intact. Sensation intact. A&Ox3  Psych:  Normal mood and affect.       I have reviewed ordered lab tests and independently visualized imaging below:    Recent Labs:  Recent Results (from the past 48 hour(s))   CBC with Auto Differential    Collection Time: 05/22/22  3:39 AM   Result Value Ref Range    WBC 24.0 (H) 4.3 - 11.1 K/uL    RBC 3.65 (L) 4.05 - 5.2 M/uL    Hemoglobin 11.1 (L) 11.7 - 15.4 g/dL    Hematocrit 33.8 (L) 35.8 - 46.3 %    MCV 92.6 79.6 - 97.8 FL    MCH 30.4 26.1 - 32.9 PG    MCHC 32.8 31.4 - 35.0 g/dL    RDW 14.2 11.9 - 14.6 %    Platelets 469 401 - 936 K/uL    MPV 11.4 9.4 - 12.3 FL    nRBC 0.00 0.0 - 0.2 K/uL    Differential Type AUTOMATED      Seg Neutrophils 89 (H) 43 - 78 %    Lymphocytes 7 (L) 13 - 44 %    Monocytes 3 (L) 4.0 - 12.0 %    Eosinophils % 0 (L) 0.5 - 7.8 %    Basophils 0 0.0 - 2.0 %    Immature Granulocytes 1 0.0 - 5.0 %    Segs Absolute 21.5 (H) 1.7 - 8.2 K/UL    Absolute Lymph # 1.6 0.5 - 4.6 K/UL    Absolute Mono # 0.6 0.1 - 1.3 K/UL    Absolute Eos # 0.0 0.0 - 0.8 K/UL    Basophils Absolute 0.0 0.0 - 0.2 K/UL    Absolute Immature Granulocyte 0.2 0.0 - 0.5 K/UL   Basic Metabolic Panel    Collection Time: 05/22/22  3:39 AM   Result Value Ref Range    Sodium 141 136 - 145 mmol/L    Potassium 3.6 3.5 - 5.1 mmol/L    Chloride 110 (H) 98 - 107 mmol/L    CO2 24 21 - 32 mmol/L    Anion Gap 7 7 - 16 mmol/L    Glucose 119 (H) 65 - 100 mg/dL    BUN 16 8 - 23 MG/DL    CREATININE 0.70 0.6 - 1.0 MG/DL    GFR African American >60 >60 ml/min/1.73m2    GFR Non- >60 >60 ml/min/1.73m2    Calcium 8.7 8.3 - 10.4 MG/DL   CBC with Auto Differential    Collection Time: 05/23/22  3:44 AM   Result Value Ref Range    WBC 16.6 (H) 4.3 - 11.1 K/uL    RBC 3.79 (L) 4.05 - 5.2 M/uL    Hemoglobin 11.4 (L) 11.7 - 15.4 g/dL    Hematocrit 34.8 (L) 35.8 - 46.3 %    MCV 91.8 79.6 - 97.8 FL    MCH 30.1 26.1 - 32.9 PG    MCHC 32.8 31.4 - 35.0 g/dL    RDW 14.6 11.9 - 14.6 %    Platelets 635 274 - 154 K/uL    MPV 11.7 9.4 - 12.3 FL    nRBC 0.00 0.0 - 0.2 K/uL    Differential Type AUTOMATED      Seg Neutrophils 84 (H) 43 - 78 %    Lymphocytes 12 (L) 13 - 44 %    Monocytes 3 (L) 4.0 - 12.0 %    Eosinophils % 0 (L) 0.5 - 7.8 %    Basophils 0 0.0 - 2.0 %    Immature Granulocytes 1 0.0 - 5.0 %    Segs Absolute 14.0 (H) 1.7 - 8.2 K/UL    Absolute Lymph # 2.0 0.5 - 4.6 K/UL    Absolute Mono # 0.4 0.1 - 1.3 K/UL    Absolute Eos # 0.0 0.0 - 0.8 K/UL    Basophils Absolute 0.0 0.0 - 0.2 K/UL    Absolute Immature Granulocyte 0.2 0.0 - 0.5 K/UL   Basic Metabolic Panel    Collection Time: 05/23/22  3:44 AM   Result Value Ref Range    Sodium 142 136 - 145 mmol/L    Potassium 4.5 3.5 - 5.1 mmol/L    Chloride 110 (H) 98 - 107 mmol/L    CO2 26 21 - 32 mmol/L    Anion Gap 6 (L) 7 - 16 mmol/L    Glucose 103 (H) 65 - 100 mg/dL    BUN 16 8 - 23 MG/DL    CREATININE 0.80 0.6 - 1.0 MG/DL    GFR African American >60 >60 ml/min/1.73m2    GFR Non- >60 >60 ml/min/1.73m2    Calcium 8.6 8.3 - 10.4 MG/DL         Other Studies:  XR CHEST (2 VW)    Result Date: 5/20/2022  CHEST X-RAY, 2 views 5/20/2022 History: Not feeling well since this morning. Chronic cough. Difficulty taking deep breaths. Technique: PA and lateral views of the chest. Comparison: Chest x-ray 4/20/2021 Findings: A stable left sided intracardiac device is seen. The cardiac silhouette is normal in respect to size. The lungs are expanded without evidence for pneumothorax. New focal density is seen in the left midlung field. Although incompletely characterized, this is favored to represent infiltrate given the clinical history provided. Specifically, pneumonia is favored. No significant associated pleural effusion is seen. The bony thorax demonstrates no acute changes. The upper abdomen is unremarkable in appearance.      1.  New focal opacity in the left midlung field favored to represent pneumonia given the clinical history provided. However, a follow-up chest x-ray is recommended following completion of treatment to ensure subsequent resolution. This report was made using voice transcription. Despite my best efforts to avoid any, transcription errors may persist. If there is any question about the accuracy of the report or need for clarification, then please call [phone], or text me through [email or web address] for clarification or correction.        Current Meds:  Current Facility-Administered Medications   Medication Dose Route Frequency    azithromycin (ZITHROMAX) 500 mg in sodium chloride 0.9 % 250 mL IVPB ([id number])  500 mg IntraVENous Q24H    levothyroxine (SYNTHROID) tablet 88 mcg  88 mcg Oral Daily    metoprolol succinate (TOPROL XL) extended release tablet 50 mg  50 mg Oral Daily    pantoprazole (PROTONIX) tablet 40 mg  40 mg Oral QAM AC    venlafaxine (EFFEXOR XR) extended release capsule 75 mg  75 mg Oral Daily with breakfast    mometasone-formoterol (DULERA) 200-5 MCG/ACT inhaler 2 puff  2 puff Inhalation BID    saccharomyces boulardii (FLORASTOR) capsule 250 mg  250 mg Oral BID    guaiFENesin (MUCINEX) extended release tablet 600 mg  600 mg Oral BID    acetaminophen (TYLENOL) tablet 650 mg  650 mg Oral Q4H PRN    albuterol sulfate  (90 Base) MCG/ACT inhaler 2 puff  2 puff Inhalation Q6H PRN    cefepime (MAXIPIME) 1000 mg IVPB minibag in NS  1,000 mg IntraVENous Q12H    dexamethasone (DECADRON) injection 6 mg  6 mg IntraVENous Q24H    ibuprofen (ADVIL;MOTRIN) tablet 200 mg  200 mg Oral Q4H PRN    apixaban (ELIQUIS) tablet 5 mg  5 mg Oral BID    sodium chloride flush 0.9 % injection 5-40 mL  5-40 mL IntraVENous PRN    sodium chloride flush 0.9 % injection 5-40 mL  5-40 mL IntraVENous Q8H       Signed:  Becky Amado MD

## 2022-05-23 NOTE — PROGRESS NOTES
Patient resting in bed with no pain or distress noted. Patient on RA with RR even/unlabored with non-productive cough. Call light in reach and will continue to assess.

## 2022-05-24 VITALS
HEART RATE: 60 BPM | RESPIRATION RATE: 18 BRPM | HEIGHT: 63 IN | DIASTOLIC BLOOD PRESSURE: 74 MMHG | OXYGEN SATURATION: 96 % | BODY MASS INDEX: 23.6 KG/M2 | WEIGHT: 133.2 LBS | SYSTOLIC BLOOD PRESSURE: 132 MMHG | TEMPERATURE: 98.1 F

## 2022-05-24 LAB
ANION GAP SERPL CALC-SCNC: 7 MMOL/L (ref 7–16)
BASOPHILS # BLD: 0.1 K/UL (ref 0–0.2)
BASOPHILS NFR BLD: 1 % (ref 0–2)
BUN SERPL-MCNC: 13 MG/DL (ref 8–23)
CALCIUM SERPL-MCNC: 8.6 MG/DL (ref 8.3–10.4)
CHLORIDE SERPL-SCNC: 109 MMOL/L (ref 98–107)
CO2 SERPL-SCNC: 25 MMOL/L (ref 21–32)
CREAT SERPL-MCNC: 0.7 MG/DL (ref 0.6–1)
DIFFERENTIAL METHOD BLD: ABNORMAL
EOSINOPHIL # BLD: 0 K/UL (ref 0–0.8)
EOSINOPHIL NFR BLD: 0 % (ref 0.5–7.8)
ERYTHROCYTE [DISTWIDTH] IN BLOOD BY AUTOMATED COUNT: 14.4 % (ref 11.9–14.6)
GLUCOSE SERPL-MCNC: 97 MG/DL (ref 65–100)
HCT VFR BLD AUTO: 37.5 % (ref 35.8–46.3)
HGB BLD-MCNC: 12.2 G/DL (ref 11.7–15.4)
IMM GRANULOCYTES # BLD AUTO: 0.3 K/UL (ref 0–0.5)
IMM GRANULOCYTES NFR BLD AUTO: 3 % (ref 0–5)
LYMPHOCYTES # BLD: 1.9 K/UL (ref 0.5–4.6)
LYMPHOCYTES NFR BLD: 17 % (ref 13–44)
MCH RBC QN AUTO: 30.2 PG (ref 26.1–32.9)
MCHC RBC AUTO-ENTMCNC: 32.5 G/DL (ref 31.4–35)
MCV RBC AUTO: 92.8 FL (ref 79.6–97.8)
MONOCYTES # BLD: 0.4 K/UL (ref 0.1–1.3)
MONOCYTES NFR BLD: 3 % (ref 4–12)
NEUTS SEG # BLD: 8.5 K/UL (ref 1.7–8.2)
NEUTS SEG NFR BLD: 76 % (ref 43–78)
NRBC # BLD: 0 K/UL (ref 0–0.2)
PLATELET # BLD AUTO: 377 K/UL (ref 150–450)
PMV BLD AUTO: 10.9 FL (ref 9.4–12.3)
POTASSIUM SERPL-SCNC: 4.2 MMOL/L (ref 3.5–5.1)
RBC # BLD AUTO: 4.04 M/UL (ref 4.05–5.2)
SODIUM SERPL-SCNC: 141 MMOL/L (ref 136–145)
WBC # BLD AUTO: 11.1 K/UL (ref 4.3–11.1)

## 2022-05-24 PROCEDURE — 94760 N-INVAS EAR/PLS OXIMETRY 1: CPT

## 2022-05-24 PROCEDURE — 6360000002 HC RX W HCPCS: Performed by: FAMILY MEDICINE

## 2022-05-24 PROCEDURE — 6370000000 HC RX 637 (ALT 250 FOR IP): Performed by: INTERNAL MEDICINE

## 2022-05-24 PROCEDURE — 94640 AIRWAY INHALATION TREATMENT: CPT

## 2022-05-24 PROCEDURE — 85025 COMPLETE CBC W/AUTO DIFF WBC: CPT

## 2022-05-24 PROCEDURE — 2580000003 HC RX 258: Performed by: INTERNAL MEDICINE

## 2022-05-24 PROCEDURE — 80048 BASIC METABOLIC PNL TOTAL CA: CPT

## 2022-05-24 PROCEDURE — 36415 COLL VENOUS BLD VENIPUNCTURE: CPT

## 2022-05-24 PROCEDURE — 2580000003 HC RX 258: Performed by: FAMILY MEDICINE

## 2022-05-24 PROCEDURE — 6370000000 HC RX 637 (ALT 250 FOR IP): Performed by: FAMILY MEDICINE

## 2022-05-24 PROCEDURE — 93005 ELECTROCARDIOGRAM TRACING: CPT | Performed by: FAMILY MEDICINE

## 2022-05-24 RX ORDER — FLUCONAZOLE 100 MG/1
200 TABLET ORAL ONCE
Status: COMPLETED | OUTPATIENT
Start: 2022-05-24 | End: 2022-05-24

## 2022-05-24 RX ORDER — DEXAMETHASONE 6 MG/1
6 TABLET ORAL
Qty: 5 TABLET | Refills: 0 | Status: SHIPPED | OUTPATIENT
Start: 2022-05-24 | End: 2022-05-29

## 2022-05-24 RX ORDER — SACCHAROMYCES BOULARDII 250 MG
250 CAPSULE ORAL 2 TIMES DAILY
Qty: 10 CAPSULE | Refills: 0 | Status: SHIPPED | OUTPATIENT
Start: 2022-05-24 | End: 2022-05-29

## 2022-05-24 RX ORDER — LEVOFLOXACIN 500 MG/1
500 TABLET, FILM COATED ORAL DAILY
Qty: 5 TABLET | Refills: 0 | Status: SHIPPED | OUTPATIENT
Start: 2022-05-24 | End: 2022-05-29

## 2022-05-24 RX ORDER — NYSTATIN 100000 [USP'U]/G
POWDER TOPICAL
Qty: 1 EACH | Refills: 0 | Status: SHIPPED | OUTPATIENT
Start: 2022-05-24 | End: 2022-10-10

## 2022-05-24 RX ADMIN — CEFEPIME HYDROCHLORIDE 1000 MG: 1 INJECTION, POWDER, FOR SOLUTION INTRAMUSCULAR; INTRAVENOUS at 04:58

## 2022-05-24 RX ADMIN — ACETAMINOPHEN 650 MG: 325 TABLET ORAL at 12:05

## 2022-05-24 RX ADMIN — LEVOTHYROXINE SODIUM 88 MCG: 0.09 TABLET ORAL at 06:47

## 2022-05-24 RX ADMIN — VENLAFAXINE HYDROCHLORIDE 75 MG: 75 CAPSULE, EXTENDED RELEASE ORAL at 08:21

## 2022-05-24 RX ADMIN — PANTOPRAZOLE SODIUM 40 MG: 40 TABLET, DELAYED RELEASE ORAL at 06:46

## 2022-05-24 RX ADMIN — FLUCONAZOLE 200 MG: 100 TABLET ORAL at 13:44

## 2022-05-24 RX ADMIN — APIXABAN 5 MG: 5 TABLET, FILM COATED ORAL at 08:21

## 2022-05-24 RX ADMIN — GUAIFENESIN 600 MG: 600 TABLET ORAL at 08:21

## 2022-05-24 RX ADMIN — MOMETASONE FUROATE AND FORMOTEROL FUMARATE DIHYDRATE 2 PUFF: 200; 5 AEROSOL RESPIRATORY (INHALATION) at 07:58

## 2022-05-24 RX ADMIN — Medication 250 MG: at 08:21

## 2022-05-24 RX ADMIN — SODIUM CHLORIDE, PRESERVATIVE FREE 10 ML: 5 INJECTION INTRAVENOUS at 06:47

## 2022-05-24 RX ADMIN — METOPROLOL SUCCINATE 50 MG: 50 TABLET, EXTENDED RELEASE ORAL at 08:21

## 2022-05-24 RX ADMIN — AZITHROMYCIN MONOHYDRATE 500 MG: 500 INJECTION, POWDER, LYOPHILIZED, FOR SOLUTION INTRAVENOUS at 08:21

## 2022-05-24 ASSESSMENT — PAIN SCALES - GENERAL
PAINLEVEL_OUTOF10: 8
PAINLEVEL_OUTOF10: 0

## 2022-05-24 ASSESSMENT — PAIN DESCRIPTION - ORIENTATION: ORIENTATION: ANTERIOR

## 2022-05-24 ASSESSMENT — PAIN DESCRIPTION - LOCATION: LOCATION: HEAD

## 2022-05-24 ASSESSMENT — PAIN DESCRIPTION - DESCRIPTORS: DESCRIPTORS: ACHING

## 2022-05-24 NOTE — PROGRESS NOTES
Patient resting in bed with no distress noted. RA with RR even/unlabored.   Call light in reach and will prepare bedside shift report for oncoming nurse

## 2022-05-24 NOTE — DISCHARGE SUMMARY
Hospitalist Discharge Summary   Admit Date:  2022 11:46 PM   DC Note date: 2022  Name:  Kriss Valdivia   Age:  78 y.o. Sex:  female  :  1945   MRN:  [id number]   Room:  [facility]  PCP:  Rose Williamson MD      Initial Admission Diagnosis: Sepsis secondary to COVID-pneumonia    Problem List for this Hospitalization:  Hospital Problems           Last Modified POA    Bronchiectasis ([address] 2022 Yes    Community acquired pneumonia 2022 Yes    Cough 2022 Yes    Acquired hypothyroidism 2022 Yes    GERD (gastroesophageal reflux disease) 2022 Yes    Atrial fibrillation ([address] 2022 Yes    Sepsis ([facility] [address] 2022 Yes    Lab test positive for detection of COVID-19 virus 2022 Yes        H&P  Kriss Valdivia is a 78 y.o.  female with a h/o AFib s/p PPM on Eliquis, hypothyroidism, GERD who presented today with c/o generalized weakness and mylagias that started  this morning. She is vaccinated against COVID-19 and has received her booster. Her  had COVID several weeks ago and she has been fine up until today. Had mild nausea w/o vomiting or diarrhea. Some L sided chest pain with inspiration and mild non-productive  cough. No fevers, exertional chest pain, SOB/SAM, abdominal pain, peripheral edema or rashes. She is on RA. Labs showed WBCs 30k, procalcitonin 1.32. CXR with LML infiltrate. She is COVID-19 positive. Given antibiotics and dexamethasone. Hospital Course:  Patient admitted for sepsis secondary to COVID-pneumonia, chest x-ray showed left middle lobe infiltrate. Also hard of signs of pleurisy, complaining of chest pain on taking a deep breath and. Patient was continued on cefepime and Zithromax with the patient history of also bronchiectasis. Patient pleurisy resolved. Cough improved. Patient white blood count almost back to normal.  Patient respiratory status markedly improved, not requiring oxygen since admission.   Patient was continued on Decadron for her COVID-pneumonia. EKG did not show any QT prolongation. Patient is clinically stable for discharge. Disposition: Home  Diet: ADULT DIET; Regular; Low Sodium (2 gm)  Code Status: DNR       10 day isolation in total for COVID. Come back to ER if any shortness of breath or any new fever . Follow up with PCP in a week with cbc and bmp. Need incentive spirometer and flutter valve at discharge. Time spent in patient discharge and coordination 32 minutes. Plan was discussed with Patient. All questions answered. Patient was stable at time of discharge. Instructions given to call a physician or return if any concerns. Echocardiogram/EKG results:  No QT prolongation        Diagnostic Imaging/Tests:   XR CHEST (2 VW)    Result Date: 5/20/2022  CHEST X-RAY, 2 views 5/20/2022 History: Not feeling well since this morning. Chronic cough. Difficulty taking deep breaths. Technique: PA and lateral views of the chest. Comparison: Chest x-ray 4/20/2021 Findings: A stable left sided intracardiac device is seen. The cardiac silhouette is normal in respect to size. The lungs are expanded without evidence for pneumothorax. New focal density is seen in the left midlung field. Although incompletely characterized, this is favored to represent infiltrate given the clinical history provided. Specifically, pneumonia is favored. No significant associated pleural effusion is seen. The bony thorax demonstrates no acute changes. The upper abdomen is unremarkable in appearance. 1.  New focal opacity in the left midlung field favored to represent pneumonia given the clinical history provided. However, a follow-up chest x-ray is recommended following completion of treatment to ensure subsequent resolution. This report was made using voice transcription.  Despite my best efforts to avoid any, transcription errors may persist. If there is any question about the accuracy of the report or need for clarification, then please call [phone], or text me through [email or web address] for clarification or correction. Labs: Results:       BMP, Mg, Phos Recent Labs     05/22/22  0339 05/23/22  0344 05/24/22  0336    142 141   K 3.6 4.5 4.2   * 110* 109*   CO2 24 26 25   BUN 16 16 13      CBC Recent Labs     05/22/22  0339 05/23/22  0344 05/24/22  0336   WBC 24.0* 16.6* 11.1   RBC 3.65* 3.79* 4.04*   HGB 11.1* 11.4* 12.2   HCT 33.8* 34.8* 37.5    349 377      LFT No results for input(s): ALT, TP, ALB, GLOB in the last 72 hours.     Invalid input(s): SGOT, TBIL, AP, AGRAT, GPT   Cardiac Testing Lab Results   Component Value Date     03/31/2021     03/30/2021      Coagulation Tests No results found for: INR, APTT   A1c No results found for: HBA1C   Lipid Panel No results found for: CHOL, CHOLPOCT, CHOLX, CHLST, CHOLV, 329586, HDL, HDLC, LDL, LDLC, 599857, VLDLC, VLDL, TGLX, TRIGL   Thyroid Panel Lab Results   Component Value Date    TSH 0.330 03/30/2021        Most Recent UA No results found for: MUCUS, UCOM       All Labs from Last 24 Hrs:  Recent Results (from the past 24 hour(s))   CBC with Auto Differential    Collection Time: 05/24/22  3:36 AM   Result Value Ref Range    WBC 11.1 4.3 - 11.1 K/uL    RBC 4.04 (L) 4.05 - 5.2 M/uL    Hemoglobin 12.2 11.7 - 15.4 g/dL    Hematocrit 37.5 35.8 - 46.3 %    MCV 92.8 79.6 - 97.8 FL    MCH 30.2 26.1 - 32.9 PG    MCHC 32.5 31.4 - 35.0 g/dL    RDW 14.4 11.9 - 14.6 %    Platelets 602 043 - 918 K/uL    MPV 10.9 9.4 - 12.3 FL    nRBC 0.00 0.0 - 0.2 K/uL    Differential Type AUTOMATED      Seg Neutrophils 76 43 - 78 %    Lymphocytes 17 13 - 44 %    Monocytes 3 (L) 4.0 - 12.0 %    Eosinophils % 0 (L) 0.5 - 7.8 %    Basophils 1 0.0 - 2.0 %    Immature Granulocytes 3 0.0 - 5.0 %    Segs Absolute 8.5 (H) 1.7 - 8.2 K/UL    Absolute Lymph # 1.9 0.5 - 4.6 K/UL    Absolute Mono # 0.4 0.1 - 1.3 K/UL    Absolute Eos # 0.0 0.0 - 0.8 K/UL    Basophils Absolute 0.1 0.0 - 0.2 K/UL    Absolute Immature Granulocyte 0.3 0.0 - 0.5 K/UL   Basic Metabolic Panel    Collection Time: 05/24/22  3:36 AM   Result Value Ref Range    Sodium 141 136 - 145 mmol/L    Potassium 4.2 3.5 - 5.1 mmol/L    Chloride 109 (H) 98 - 107 mmol/L    CO2 25 21 - 32 mmol/L    Anion Gap 7 7 - 16 mmol/L    Glucose 97 65 - 100 mg/dL    BUN 13 8 - 23 MG/DL    CREATININE 0.70 0.6 - 1.0 MG/DL    GFR African American >60 >60 ml/min/1.73m2    GFR Non- >60 >60 ml/min/1.73m2    Calcium 8.6 8.3 - 10.4 MG/DL   EKG 12 Lead    Collection Time: 05/24/22  8:59 AM   Result Value Ref Range    Ventricular Rate 60 BPM    Atrial Rate 60 BPM    P-R Interval 160 ms    QRS Duration 86 ms    Q-T Interval 418 ms    QTc Calculation (Bazett) 418 ms    P Axis 54 degrees    R Axis 51 degrees    T Axis 61 degrees    Diagnosis Atrial-paced rhythm          Current Discharge Medication List      START taking these medications    Details   saccharomyces boulardii (FLORASTOR) 250 MG capsule Take 1 capsule by mouth 2 times daily for 5 days  Qty: 10 capsule, Refills: 0      levoFLOXacin (LEVAQUIN) 500 MG tablet Take 1 tablet by mouth daily for 5 days  Qty: 5 tablet, Refills: 0      dexamethasone (DECADRON) 6 MG tablet Take 1 tablet by mouth daily (with breakfast) for 5 days  Qty: 5 tablet, Refills: 0      nystatin (MYCOSTATIN) 141111 UNIT/GM powder Apply 3 times daily.   Qty: 1 each, Refills: 0         CONTINUE these medications which have NOT CHANGED    Details   apixaban (ELIQUIS) 5 MG TABS tablet Take 5 mg by mouth 2 times daily      melatonin 5 MG TABS tablet Take 5 mg by mouth      fluticasone (FLONASE) 50 MCG/ACT nasal spray INSTILL 2 SPRAYS INTO EACH NOSTRIL EVERY DAY      fluticasone-salmeterol (ADVAIR) 250-50 MCG/DOSE AEPB Inhale 1 puff into the lungs 2 times daily      guaiFENesin (MUCINEX) 600 MG extended release tablet Take 600 mg by mouth daily      levalbuterol (XOPENEX) 0.63 MG/3ML nebulization Inhale 0.63 mg into the lungs 2 times daily      levalbuterol (XOPENEX HFA) 45 MCG/ACT inhaler Inhale 2 puffs into the lungs every 4 hours as needed      levothyroxine (SYNTHROID) 88 MCG tablet Take 88 mcg by mouth daily Most recent fill 2/21/22      metoprolol succinate (TOPROL XL) 50 MG extended release tablet Take 50 mg by mouth daily      pantoprazole (PROTONIX) 40 MG tablet Take 40 mg by mouth      raloxifene (EVISTA) 60 MG tablet Take 60 mg by mouth daily      venlafaxine (EFFEXOR) 75 MG tablet Take 75 mg by mouth daily         STOP taking these medications       cyanocobalamin 500 MCG tablet Comments:   Reason for Stopping:               Allergies   Allergen Reactions    Duloxetine Other (See Comments)    Amoxicillin-Pot Clavulanate Diarrhea    Codeine      Other reaction(s): Agitation-Intolerance    Cucumber Extract      Other reaction(s): Abdominal pain-Intolerance  ASSOCIATED WITH GI UPSET    Sulfa Antibiotics Other (See Comments) and Hives    Sulfur Hives     Immunization History   Administered Date(s) Administered    COVID-19, Pfizer Purple top, DILUTE for use, 12+ yrs, 30mcg/0.3mL dose 01/20/2021, 02/06/2021, 09/27/2021    Influenza A (D3T8-62) Vaccine IM 10/11/2017    Influenza Trivalent 09/18/2013, 10/18/2015, 10/01/2016, 10/01/2018    Influenza Virus Vaccine 09/18/2013, 10/01/2014, 09/18/2016, 10/15/2018, 10/15/2019, 08/26/2020, 10/01/2021    Influenza, High Dose (Fluzone 65 yrs and older) 10/15/2016, 10/24/2017, 10/01/2018, 10/15/2018    Influenza, High-dose, Huong Norman, 65 yrs +, IM (Fluzone) 08/26/2020    Influenza, Triv, inactivated, subunit, adjuvanted, IM (Fluad 65 yrs and older) 10/15/2019    PPD Test 01/01/2005    Pneumococcal Conjugate 13-valent ([id number]) 12/02/2014    Pneumococcal Polysaccharide ([id number]) 01/01/2000, 01/01/2002, 07/20/2015, 10/25/2015, 01/03/2017    Pneumococcal Vaccine 01/01/2000, 10/25/2015, 01/01/2017    Tdap (Boostrix, Adacel) 11/20/2013    Tetanus Toxoid, absorbed 07/25/2006    Tst, Unspecified Formulation 01/01/2005    Zoster Recombinant (Shingrix) 09/25/2019, 09/26/2019, 12/10/2019       Recent Vital Data:  Patient Vitals for the past 24 hrs:   Temp Pulse Resp BP SpO2   05/24/22 1051 98.1 °F (36.7 °C) 60 18 132/74 96 %   05/24/22 0735 97.7 °F (36.5 °C) 59 18 (!) 146/73 99 %   05/24/22 0220 97.7 °F (36.5 °C) 62 -- 113/67 97 %   05/23/22 2248 98.1 °F (36.7 °C) 61 -- 126/66 95 %   05/23/22 1928 98.4 °F (36.9 °C) 60 18 121/61 94 %   05/23/22 1457 98.2 °F (36.8 °C) 60 18 (!) 119/55 94 %       Estimated body mass index is 23.6 kg/m² as calculated from the following:    Height as of this encounter: 5' 3\" (1.6 m). Weight as of this encounter: 133 lb 3.2 oz (60.4 kg). Intake/Output Summary (Last 24 hours) at 5/24/2022 1245  Last data filed at 5/24/2022 0830  Gross per 24 hour   Intake 840 ml   Output --   Net 840 ml         Physical Exam:    General:    Well nourished. No overt distress  Head:  Normocephalic, atraumatic  Eyes:  Sclerae appear normal.  Pupils equally round. HENT:  Nares appear normal, no drainage. Moist mucous membranes  Neck:  No restricted ROM. Trachea midline  CV:   RRR. No m/r/g. No JVD  Lungs:   Mild coarse breath sounds improving. Abdomen:   Soft, nontender, nondistended. Extremities: Warm and dry. No cyanosis or clubbing. No edema. Skin:     No rashes. Normal coloration  Neuro:  CN II-XII grossly intact. Psych:  Normal mood and affect. Follow Ups:   Ramiro Russ MD In 1 week. Specialty: Internal Medicine  Why: cbc, bmp and magnesium.   Contact information:  [address] [address]  [phone]                       Signed:  Jose Palumbo MD

## 2022-05-24 NOTE — CARE COORDINATION
CM completed assessment with patient's spouse by calling [phone] when patient was unavailable to answer her room phone. Patient's spouse stated that patient was independent prior to admission - no use of dme (aside from a nebulizer) / no home health services. Patient drives and they share the household responsibilities (cooking, cleaning, etc). They live in a multilevel home, but patient has no problems navigating the steps. Patient uses [facility] pharmacy in [facility] - no problems obtaining medications. Patient is currently on room air. Patient plans to return home at d/c - no needs anticipated at this time. Patient's spouse stated he plans to pick her up at time of d/c. CM will continue to follow for any needs at d/c.        05/24/22 9435   Service Assessment   Patient Orientation Alert and Oriented   Cognition Alert   History Provided By Significant Other   Primary Caregiver Self   Support Systems Spouse/Significant Other   PCP Verified by CM Yes   Prior Functional Level Independent in ADLs/IADLs   Current Functional Level Independent in ADLs/IADLs   Can patient return to prior living arrangement Yes   Ability to make needs known: Good   Family able to assist with home care needs: Yes   Social/Functional History   Lives With Spouse   Type of [address] [facility] Two level   Bathroom Accessibility Accessible   ADL Assistance Independent   Homemaking Assistance Independent   Ambulation Assistance Independent   Transfer Assistance Independent   Active  Yes   Mode of Transportation Car   Discharge Planning   Type of [address] Medications No   Patient expects to be discharged to: [address] Discharge   [address] Provided?  No   Mode of Transport at Discharge Self   Confirm Follow Up Transport Family   Condition of Participation: Discharge Planning   The Patient and/or Patient Representative was provided with a Choice of Provider? Patient   The Patient and/Or Patient Representative agree with the Discharge Plan? Yes   Freedom of Choice list was provided with basic dialogue that supports the patient's individualized plan of care/goals, treatment preferences, and shares the quality data associated with the providers?   Yes

## 2022-05-24 NOTE — PROGRESS NOTES
Patient resting in bed with no pain or distress noted. Patient is RA with RR even/unlabored.   Call light in reach

## 2022-05-24 NOTE — PROGRESS NOTES
DC instructions given to pt . Pt being DC home to self care. Pt medications, appts, and instructions given and understood. Prescriptions sent into pharmacy. Pt awaiting ride and then will be DC home to self care.

## 2022-05-24 NOTE — PROGRESS NOTES
Pt up ambulating in the room. Pt on RA. Pt denies pain or distress at this time. Pt encouraged to call for assistance if needed call light in reach, will monitor.

## 2022-05-24 NOTE — PLAN OF CARE
Problem: Discharge Planning  Goal: Discharge to home or other facility with appropriate resources  Outcome: Adequate for Discharge     Problem: Safety - Adult  Goal: Free from fall injury  Outcome: Adequate for Discharge     Problem: ABCDS Injury Assessment  Goal: Absence of physical injury  Outcome: Adequate for Discharge     Problem: Skin/Tissue Integrity  Goal: Absence of new skin breakdown  Description: 1. Monitor for areas of redness and/or skin breakdown  2. Assess vascular access sites hourly  3. Every 4-6 hours minimum:  Change oxygen saturation probe site  4. Every 4-6 hours:  If on nasal continuous positive airway pressure, respiratory therapy assess nares and determine need for appliance change or resting period.   Outcome: Adequate for Discharge

## 2022-05-25 ENCOUNTER — CARE COORDINATION (OUTPATIENT)
Dept: CARE COORDINATION | Facility: CLINIC | Age: 77
End: 2022-05-25

## 2022-05-25 LAB
BACTERIA SPEC CULT: NORMAL
BACTERIA SPEC CULT: NORMAL
SERVICE CMNT-IMP: NORMAL
SERVICE CMNT-IMP: NORMAL

## 2022-05-25 NOTE — CARE COORDINATION
[facility] Transitions Initial Follow Up Call    Call within 2 business days of discharge: Yes    Patient: Mayda Guardado Patient : 1945   MRN: [id number]  Reason for Admission: COVID 19  Discharge Date: 22 RARS: Readmission Risk Score: 10.5 ( )      Last Discharge [facility]       Complaint Diagnosis Description Type Department Provider    22   Admission (Discharged) Elaina Gallagher MD      Transitions of Care Initial Call    Was this an external facility discharge? No   Discharge Facility: [facility]    Challenges to be reviewed by the provider   Additional needs identified to be addressed with provider: No  none             Method of communication with provider : none    Advance Care Planning:   Does patient have an Advance Directive: reviewed and current, reviewed and needs to be updated, not on file; education provided, not on file, patient declined education, decision maker updated and referral to internal ACP facilitator. Care Transition Nurse contacted the patient by telephone to perform post hospital discharge assessment. Verified name and  with patient as identifiers. Provided introduction to self, and explanation of the CTN role. CTN reviewed discharge instructions, medical action plan and red flags with patient who verbalized understanding. Patient given an opportunity to ask questions and does not have any further questions or concerns at this time. Were discharge instructions available to patient? Yes. Reviewed appropriate site of care based on symptoms and resources available to patient including: PCP  CTN. The patient agrees to contact the PCP office for questions related to their healthcare. Medication reconciliation was performed with patient, who verbalizes understanding of administration of home medications. Advised obtaining a 90-day supply of all daily and as-needed medications. Was patient discharged with a pulse oximeter?  no    CTN provided contact information. Plan for follow-up call in 5-7 days based on severity of symptoms and risk factors.   Plan for next call: symptom management-assess for new or worsening symptoms  self management-assess compliance with medications and completion of anbx  follow up appointment-assess for changs with regimen after completion of anbx and f/u appt        Leida Alonso RN  5/25/2022         Care Transitions 24 Hour Call    Schedule Follow Up Appointment with PCP: Completed  Do you have a copy of your discharge instructions?: Yes  Do you have all of your prescriptions and are they filled?: Yes  Have you been contacted by a [address]?: No  Have you scheduled your follow up appointment?: Yes  How are you going to get to your appointment?: Car - family or friend to transport  Do you have support at home?: Partner/Spouse/SO  Do you feel like you have everything you need to keep you well at home?: Yes  Are you an active caregiver in your home?: No  Care Transitions Interventions         Follow Up  Future Appointments   Date Time Provider Diego Parsons   6/15/2022  8:40 AM Norberto Prabhakar MD [facility]       Leida Alonso RN

## 2022-06-01 ENCOUNTER — CARE COORDINATION (OUTPATIENT)
Dept: CARE COORDINATION | Facility: CLINIC | Age: 77
End: 2022-06-01

## 2022-06-01 LAB
EKG ATRIAL RATE: 60 BPM
EKG DIAGNOSIS: NORMAL
EKG P AXIS: 54 DEGREES
EKG P-R INTERVAL: 160 MS
EKG Q-T INTERVAL: 418 MS
EKG QRS DURATION: 86 MS
EKG QTC CALCULATION (BAZETT): 418 MS
EKG R AXIS: 51 DEGREES
EKG T AXIS: 61 DEGREES
EKG VENTRICULAR RATE: 60 BPM

## 2022-06-01 NOTE — CARE COORDINATION
Care Transitions Follow Up Call    Needs to be reviewed by the provider   Additional needs identified to be addressed with provider: Yes  none             Method of communication with provider : none      Care Transition Nurse contacted the patient by telephone to follow up after admission on . Verified name and  with patient as identifiers. Addressed changes since last contact: Patient completed anbx and steroids from d/c. Assess for new or worsening s/s to report  Discussed follow-up appointments. If no appointment was previously scheduled, appointment scheduling offered: Yes. Is follow up appointment scheduled within 7 days of discharge? Yes. Advance Care Planning:   Does patient have an Advance Directive: reviewed and current  CTN reviewed discharge instructions, medical action plan and red flags with patient and discussed any barriers to care and/or understanding of plan of care after discharge. Discussed appropriate site of care based on symptoms and resources available to patient including: PCP  Specialist  CTN. The patient agrees to contact the PCP office for questions related to their healthcare. Patients top risk factors for readmission: medical condition-CAP, Sepsis, recent COVID19  Interventions to address risk factors: Obtained and reviewed discharge summary and/or continuity of care documents      Non-[facility] follow up appointment(s): na    CTN provided contact information for future needs. Plan for follow-up call in 10-14 days based on severity of symptoms and risk factors. Plan for next call: symptom management-assess for new s/s of concern.  Patient with c/o mild loose stool retruning since completing anbx relieved by antidiarrheal. No other concerns verbalized at this time

## 2022-06-14 ENCOUNTER — OFFICE VISIT (OUTPATIENT)
Dept: PULMONOLOGY | Age: 77
End: 2022-06-14
Payer: MEDICARE

## 2022-06-14 ENCOUNTER — HOSPITAL ENCOUNTER (OUTPATIENT)
Dept: GENERAL RADIOLOGY | Age: 77
Discharge: HOME | End: 2022-06-17
Payer: MEDICARE

## 2022-06-14 VITALS
RESPIRATION RATE: 14 BRPM | WEIGHT: 129 LBS | TEMPERATURE: 97.2 F | HEART RATE: 73 BPM | DIASTOLIC BLOOD PRESSURE: 72 MMHG | SYSTOLIC BLOOD PRESSURE: 116 MMHG | HEIGHT: 63 IN | OXYGEN SATURATION: 99 % | BODY MASS INDEX: 22.86 KG/M2

## 2022-06-14 DIAGNOSIS — J47.9 BRONCHIECTASIS WITHOUT ACUTE EXACERBATION (HCC): ICD-10-CM

## 2022-06-14 DIAGNOSIS — J18.9 COMMUNITY ACQUIRED PNEUMONIA, UNSPECIFIED LATERALITY: ICD-10-CM

## 2022-06-14 DIAGNOSIS — Z09 HOSPITAL DISCHARGE FOLLOW-UP: ICD-10-CM

## 2022-06-14 DIAGNOSIS — U07.1 PNEUMONIA DUE TO COVID-19 VIRUS: Primary | ICD-10-CM

## 2022-06-14 DIAGNOSIS — J12.82 PNEUMONIA DUE TO COVID-19 VIRUS: Primary | ICD-10-CM

## 2022-06-14 DIAGNOSIS — J47.9 BRONCHIECTASIS WITHOUT COMPLICATION (HCC): Primary | ICD-10-CM

## 2022-06-14 DIAGNOSIS — J47.9 BRONCHIECTASIS WITHOUT COMPLICATION (HCC): ICD-10-CM

## 2022-06-14 PROCEDURE — 99214 OFFICE O/P EST MOD 30 MIN: CPT | Performed by: NURSE PRACTITIONER

## 2022-06-14 PROCEDURE — G8400 PT W/DXA NO RESULTS DOC: HCPCS | Performed by: NURSE PRACTITIONER

## 2022-06-14 PROCEDURE — 3017F COLORECTAL CA SCREEN DOC REV: CPT | Performed by: NURSE PRACTITIONER

## 2022-06-14 PROCEDURE — 1123F ACP DISCUSS/DSCN MKR DOCD: CPT | Performed by: NURSE PRACTITIONER

## 2022-06-14 PROCEDURE — 1111F DSCHRG MED/CURRENT MED MERGE: CPT | Performed by: NURSE PRACTITIONER

## 2022-06-14 PROCEDURE — 71046 X-RAY EXAM CHEST 2 VIEWS: CPT

## 2022-06-14 PROCEDURE — G8427 DOCREV CUR MEDS BY ELIG CLIN: HCPCS | Performed by: NURSE PRACTITIONER

## 2022-06-14 PROCEDURE — G8420 CALC BMI NORM PARAMETERS: HCPCS | Performed by: NURSE PRACTITIONER

## 2022-06-14 PROCEDURE — 1036F TOBACCO NON-USER: CPT | Performed by: NURSE PRACTITIONER

## 2022-06-14 PROCEDURE — 1090F PRES/ABSN URINE INCON ASSESS: CPT | Performed by: NURSE PRACTITIONER

## 2022-06-14 ASSESSMENT — ENCOUNTER SYMPTOMS
DIARRHEA: 0
NAUSEA: 1
VOMITING: 0
CONSTIPATION: 0
ABDOMINAL PAIN: 0

## 2022-06-14 NOTE — PROGRESS NOTES
[address], [facility]  Susi Ang, [address] [facility]  [phone]        Name:  Anthony Us  YOB: 1945  MRN:  [id number]    Office visit  6/14/2022      Chief Complaint   Patient presents with    Follow-Up from [address] [place] [address] Ave:  The patient is a 78year old female who is seen for hospital follow up. Hospital records from [facility] are reviewed and discharge meds are updated. She was hospitalized 5/20/22 thru 5/24/22. She has a history of atrial fib (on Eliquis), hypothyroidism, GERD, and bronchiectasis. She presented to the ER with weakness and myalgias. WBC was 30K, procalcitonin was elevated, and CXR revealed left lingular infiltrate. Covid test was positive. Was treated for sepsis secondary to Covid-pneumonia. Was treated with cefepime and Zithromax and Decadron. Cough has nearly resolved. No wheezing. Reports mild SAM which is near baseline. Feels generally well. Has returned to usual activities. She still has not received her nebulizer from her [employer] company that was ordered last month. ASSESSMENT/PLAN:  (Medical Decision Making)  Below is the assessment and plan developed based on review of pertinent history, physical exam, labs, studies, and medications. Encounter Diagnoses   Name Primary?  Pneumonia due to COVID-19 virus  --CXR today is clear   Yes    Bronchiectasis without acute exacerbation ([facility] [address]  --continue mucus clearing wit Mucinex and bronchodilators. --have called [facility]--they will get nebulizer to her today or tomorrow. [facility] discharge follow-up      The current medical regimen is effective;  continue present plan and medications. Follow up with me in 4 months with spirometry. Collaborating physician is Dr. Kiet Padilla. AG       Total time spent was 34 minutes.   This time includes chart prep, review of tests/procedures, review of other provider's notes, documentation, and counseling patient regarding disease process and medications. Kirstie Lund NP, APRN - CNP  Electronically signed          ~~~~~~~~~~~~~~~~~~~~~~~~~~~~~~~~~~~~~~~~  REFERENCE INFORMATION      Past Medical History:   Diagnosis Date    Abnormal chest CT February, 2015    The patient had a CT scan that showed infiltrative as well as tree-in-bud changes felt to probably represent atypical mycobacteria infection. However, bronchoscopy with BAL was negative. 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  Graves disease 2010    Thyroidectomy    Ineffective airway clearance     Bronchoscopy: 10/3/11, moderate, thick purulent mucus in the right main bronchus, preliminary positive for Aspergillus flavus.  LLL pneumonia 2010    treated as outpatient    Menopause        Past Surgical History:   Procedure Laterality Date    CATARACT REMOVAL Bilateral     CHOLECYSTECTOMY  2012    HYSTERECTOMY (CERVIX STATUS UNKNOWN)      ORTHOPEDIC SURGERY      right foot surgery    OTHER SURGICAL HISTORY  February, 2016    Bronchoscopy with BAL    SALPINGO-OOPHORECTOMY Bilateral     THYROIDECTOMY  2010       Family History   Problem Relation Age of Onset    Breast Cancer Sister 79    Asthma Sister     Ovarian Cancer Neg Hx     Colon Cancer Neg Hx     Breast Cancer Paternal Aunt [de-identified]    Breast Cancer Maternal Aunt [de-identified]    Other Sister         The patient has a twin sister and an older sister both of whom have bronchiectasis with atypical mycobacteria infection    Deep Vein Thrombosis Father         Secondary to a motor vehicle accident.     Cancer Brother         Prostate    Cancer Brother         Prostate       Social History     Socioeconomic History    Marital status:      Spouse name: Not on file    Number of children: Not on file    Years of education: Not on file    Highest education level: Not on file   Occupational History    Not on file   Tobacco Use    Smoking status: Never Smoker    Smokeless tobacco: Never Used   Substance and Sexual Activity    Alcohol use: No     Alcohol/week: 0.0 standard drinks    Drug use: No    Sexual activity: Not on file     Comment: hyst   Other Topics Concern    Not on file   Social History Narrative    She has lived in second-hand smoke most of her life. Social Determinants of Health     Financial Resource Strain:     Difficulty of Paying Living Expenses: Not on file   Food Insecurity:     Worried About Running Out of Food in the Last Year: Not on file    [place]all of Food in the Last Year: Not on file   Transportation Needs:     Lack of Transportation (Medical): Not on file    Lack of Transportation (Non-Medical):  Not on file   Physical Activity:     Days of Exercise per Week: Not on file    Minutes of Exercise per Session: Not on file   Stress:     Feeling of Stress : Not on file   Social Connections:     Frequency of Communication with Friends and Family: Not on file    Frequency of Social Gatherings with Friends and Family: Not on file    Attends [employer] Services: Not on file    Active Member of [address] [place] [address] [employer] or Organizations: Not on file    Attends Club or Organization Meetings: Not on file    Marital Status: Not on file   Intimate Partner Violence:     Fear of Current or Ex-Partner: Not on file    Emotionally Abused: Not on file    Physically Abused: Not on file    Sexually Abused: Not on file   Housing Stability:     Unable to Pay for Housing in the Last Year: Not on file    Number of [place] in the Last Year: Not on file    Unstable Housing in the Last Year: Not on file       Patient Active Problem List   Diagnosis    Bron[facility]ctasis [facility] [address]    Dyspnea    Osteopenia    Community acquired pneumonia    Cough    Acquired hypothyroidism    GERD (gastroesophageal reflux disease)    Leukocytosis    Allergic rhinitis due to pollen    Pleural effusion, bilateral    Parapneumonic effusion    Chronic arthritis    Atrial fibrillation (HCC)    Abnormal chest CT    Thrombocytosis    Sepsis (HCC)    Lab test positive for detection of COVID-19 virus          Allergies   Allergen Reactions    Duloxetine Other (See Comments)    Amoxicillin-Pot Clavulanate Diarrhea    Codeine      Other reaction(s): Agitation-Intolerance    Cucumber Extract      Other reaction(s): Abdominal pain-Intolerance  ASSOCIATED WITH GI UPSET    Sulfa Antibiotics Other (See Comments) and Hives    Sulfur Hives       Current Outpatient Medications   Medication Sig    nystatin (MYCOSTATIN) 319303 UNIT/GM powder Apply 3 times daily.  apixaban (ELIQUIS) 5 MG TABS tablet Take 5 mg by mouth 2 times daily    melatonin 5 MG TABS tablet Take 5 mg by mouth    fluticasone (FLONASE) 50 MCG/ACT nasal spray INSTILL 2 SPRAYS INTO EACH NOSTRIL EVERY DAY    fluticasone-salmeterol (ADVAIR) 250-50 MCG/DOSE AEPB Inhale 1 puff into the lungs 2 times daily    guaiFENesin (MUCINEX) 600 MG extended release tablet Take 600 mg by mouth daily    levalbuterol (XOPENEX) 0.63 MG/3ML nebulization Inhale 0.63 mg into the lungs 2 times daily    levalbuterol (XOPENEX HFA) 45 MCG/ACT inhaler Inhale 2 puffs into the lungs every 4 hours as needed    levothyroxine (SYNTHROID) 88 MCG tablet Take 88 mcg by mouth daily Most recent fill 2/21/22    metoprolol succinate (TOPROL XL) 50 MG extended release tablet Take 50 mg by mouth daily    pantoprazole (PROTONIX) 40 MG tablet Take 40 mg by mouth    raloxifene (EVISTA) 60 MG tablet Take 60 mg by mouth daily    venlafaxine (EFFEXOR) 75 MG tablet Take 75 mg by mouth daily     No current facility-administered medications for this visit. Review of Systems   Constitutional: Negative for chills, diaphoresis, fatigue and fever.    Cardiovascular: Negative for chest pain, palpitations and leg swelling. Gastrointestinal: Positive for nausea. Negative for abdominal pain, constipation, diarrhea and vomiting. Neurological: Negative for dizziness, tremors, seizures, syncope, weakness and headaches. PHYSICAL EXAM:    Vitals:    06/14/22 1523   BP: 116/72   Pulse: 73   Resp: 14   Temp: 97.2 °F (36.2 °C)   SpO2: 99%   Weight: 129 lb (58.5 kg)   Height: 5' 3\" (1.6 m)      Body mass index is 22.85 kg/m². GENERAL APPEARANCE:  The patient is normal weight and in no respiratory distress. HEENT:  PERRL. Conjunctivae unremarkable. Nasal mucosa is without epistaxis, exudate, or polyps. Gums and dentition are unremarkable. There is no oropharyngeal narrowing. TMs are clear. NECK/LYMPHATIC:  Symmetrical with no elevation of jugular venous pulsation. Trachea midline. No thyroid enlargement. No cervical adenopathy. LUNGS:  Normal respiratory effort with symmetrical lung expansion. Breath sounds clear. HEART:  There is a regular rate and rhythm. No murmur, rub, or gallop. There is no edema in the lower extremities. ABDOMEN:  Soft and non-tender. No hepatosplenomegaly. Bowel sounds are normal.    NEURO:  The patient is alert and oriented to person, place, and time. Memory appears intact and mood is normal.  No gross sensorimotor deficits are present. DIAGNOSTIC TESTS:   Imaging:   XR CHEST (2 VW) 05/20/2022    Narrative  CHEST X-RAY, 2 views 5/20/2022    History: Not feeling well since this morning. Chronic cough. Difficulty taking  deep breaths. Technique: PA and lateral views of the chest.    Comparison: Chest x-ray 4/20/2021    Findings:  A stable left sided intracardiac device is seen. The cardiac silhouette is  normal in respect to size. The lungs are expanded without evidence for  pneumothorax. New focal density is seen in the left midlung field. Although  incompletely characterized, this is favored to represent infiltrate given the  clinical history provided. Specifically, pneumonia is favored. No significant  associated pleural effusion is seen. The bony thorax demonstrates no acute changes. The upper abdomen is  unremarkable in appearance. Impression  1. New focal opacity in the left midlung field favored to represent pneumonia  given the clinical history provided. However, a follow-up chest x-ray is  recommended following completion of treatment to ensure subsequent resolution. Today--6/14/22--                Labs:     Lab Results   Component Value Date     05/24/2022    K 4.2 05/24/2022     05/24/2022    CO2 25 05/24/2022    BUN 13 05/24/2022    CREATININE 0.70 05/24/2022    GLUCOSE 97 05/24/2022    CALCIUM 8.6 05/24/2022         Lab Results   Component Value Date    WBC 11.1 05/24/2022    HGB 12.2 05/24/2022    HCT 37.5 05/24/2022    MCV 92.8 05/24/2022     05/24/2022        Lab Results   Component Value Date     (H) 03/31/2021        Lab Results   Component Value Date    TSH 0.330 (L) 03/30/2021              Dmitriy Echevarria NP, APRN - CNP  Electronically signed    Dictated using voice recognition software.   Proof read but unrecognized errors may exist.

## 2022-06-15 ENCOUNTER — CARE COORDINATION (OUTPATIENT)
Dept: CARE COORDINATION | Facility: CLINIC | Age: 77
End: 2022-06-15

## 2022-06-15 NOTE — CARE COORDINATION
[facility] Transitions Follow Up Call    6/15/2022    Patient: Nelson Monk  Patient : 1945   MRN: [id number]  Reason for Admission: COVID pna  Discharge Date: 22 RARS: Readmission Risk Score: 10.5 ( )    Care Transitions Follow Up Call    Needs to be reviewed by the provider   Additional needs identified to be addressed with provider: No  none             Method of communication with provider : none      Care Transition Nurse contacted the patient by telephone to follow up after admission on . Verified name and  with patient as identifiers. Addressed changes since last contact: outreach after follow up with pulmonary appt  Discussed follow-up appointments. If no appointment was previously scheduled, appointment scheduling offered: Yes. Is follow up appointment scheduled within 7 days of discharge? Yes. Advance Care Planning:   Does patient have an Advance Directive: reviewed and current. CTN reviewed discharge instructions, medical action plan and red flags with patient and discussed any barriers to care and/or understanding of plan of care after discharge. Discussed appropriate site of care based on symptoms and resources available to patient including: PCP  Specialist  CTN. The patient agrees to contact the PCP office for questions related to their healthcare. Patients top risk factors for readmission: medical condition-CAP, sepsis, covid PNA  Interventions to address risk factors: Obtained and reviewed discharge summary and/or continuity of care documents  Pulmonary follow up     Non-[facility] follow up appointment(s): ethan    CTN provided contact information for future needs. Plan for follow-up call in 10-14 days based on severity of symptoms and risk factors. Plan for next call: final follow up. Patient endorses great improvement in symptoms and no current concerns at this time.  CTN encouraged to call with questions/concerns             Care Transitions Subsequent and Final Call    Subsequent and Final Calls  Do you have any ongoing symptoms?: No  Have your medications changed?: No  Do you have any questions related to your medications?: No  Do you currently have any active services?: No  Do you have any needs or concerns that I can assist you with?: Yes  Patient-reported Needs or Concerns: Patient awaiting [employer] delivery for nebulizer. Patient stated if she does not have hear from them today she is to call Navin Barbosa with pulmonary. Care Transitions Interventions  No Identified Needs  Other Interventions:            Follow Up  Future Appointments   Date Time Provider Diego Parsons   10/10/2022 11:30 AM Dar Cloon, APRN - CNP [facility]PS [facility]       Army Maged RN

## 2022-06-20 PROBLEM — R05.9 COUGH: Status: RESOLVED | Noted: 2021-03-29 | Resolved: 2022-06-20

## 2022-06-29 ENCOUNTER — CARE COORDINATION (OUTPATIENT)
Dept: CARE COORDINATION | Facility: CLINIC | Age: 77
End: 2022-06-29

## 2022-06-29 NOTE — CARE COORDINATION
[facility] Transitions Follow Up Call    2022    Patient: Mitch Gomez  Patient : 1945   MRN: [id number]  Reason for Admission: sepsis r/t COVID  Discharge Date: 22 RARS: Readmission Risk Score: 10.5 ( )         Patient has graduated from the [address] [place] [address] program on . Patient/family has the ability to self-manage at this time. Patient has no further care management needs, no referral to the [facility] team for further management. Patient has Care Transition Nurse's contact information for any further questions, concerns, or needs. Patients upcoming visits:    Future Appointments   Date Time Provider Diego Parsons   10/10/2022 11:30 AM EM Solomon CNP, PPS [facility]          Care Transitions Subsequent and Final Call    Subsequent and Final Calls  Care Transitions Interventions  Other Interventions:            Follow Up  Future Appointments   Date Time Provider Diego Parsons   10/10/2022 11:30 AM EM Solomon CNP, PPS [facility]       Jessica Woodard RN

## 2022-07-12 RX ORDER — FLUTICASONE PROPIONATE 50 MCG
SPRAY, SUSPENSION (ML) NASAL
Qty: 1 EACH | Refills: 11 | Status: SHIPPED | OUTPATIENT
Start: 2022-07-12

## 2022-08-02 ENCOUNTER — HOSPITAL ENCOUNTER (OUTPATIENT)
Dept: MAMMOGRAPHY | Age: 77
Discharge: HOME | End: 2022-08-05
Payer: MEDICARE

## 2022-08-02 DIAGNOSIS — Z12.31 ENCOUNTER FOR SCREENING MAMMOGRAM FOR MALIGNANT NEOPLASM OF BREAST: ICD-10-CM

## 2022-08-02 PROCEDURE — 77063 BREAST TOMOSYNTHESIS BI: CPT

## 2022-10-09 NOTE — PROGRESS NOTES
Patient Name:  John Martinez                             YOB: 1945  MRN: [id number]                                              Office Visit 10/10/2022    ASSESSMENT AND PLAN:  (Medical Decision Making)    Marilynn Green was seen today for cough. Diagnoses and all orders for this visit:    Bronchiectasis without complication ([facility] [address]  --Spirometry stable and normal exam, but has had worsening cough. Increase Advair to bid. Rationale discussed. Also given 2 sputum cups, one for gram stain and one for AFB to check if cough becomes productive. -     Spirometry Without Bronchodilator  -     levalbuterol (XOPENEX HFA) 45 MCG/ACT inhaler; Inhale 2 puffs into the lungs every 4 hours as needed for Wheezing or Shortness of Breath  -     levalbuterol (XOPENEX) 0.63 MG/3ML nebulization; Take 3 mLs by nebulization 2 times daily  -     AFB Culture + Smear W/Rflx ID From Culture; Future  -     Culture, Respiratory; Future    Allergic rhinitis, unspecified seasonality, unspecified trigger  --possibly contributing to current worsening cough. Continue Allegra. Weight loss  --monitor for possible MAC. Sputum cultures as above. Orders Placed This Encounter   Medications    levalbuterol (XOPENEX HFA) 45 MCG/ACT inhaler     Sig: Inhale 2 puffs into the lungs every 4 hours as needed for Wheezing or Shortness of Breath     Dispense:  1 each     Refill:  11    levalbuterol (XOPENEX) 0.63 MG/3ML nebulization     Sig: Take 3 mLs by nebulization 2 times daily     Dispense:  120 each     Refill:  11     No orders of the defined types were placed in this encounter. Follow-up and Dispositions    Return in about 6 months (around 4/10/2023) for Liliya, Bronchiectasis. Isaac Brian, PIPO, APRN - CNP      Collaborating physician is Dr. Melissa Maya.     AD      _________________________________________________________________________    HISTORY OF PRESENT ILLNESS:    Ms. Sally Leone is a 78 y.o. female who is seen at [facility] today for  Cough     The patient is a 78year old female who is seen for follow up of bronchiectasis. She has a history of atrial fib (on Eliquis), hypothyroidism, and GERD. Reports worsening cough over the past week or so. Feels increased PND and just resumed Allegra a few days ago. Cough is rarely productive. No wheezing. No shortness of breath. Has only been using Advair once daily. Has lost weight since last visit. No fever, chills, or night sweats. No hemoptysis. 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      REVIEW OF SYSTEMS: 10 point review of systems is negative except as reported in HPI. PHYSICAL EXAM: Body mass index is 21.97 kg/m². Vitals:    10/10/22 1127   BP: 118/62   Pulse: 97   Resp: 17   Temp: 96.9 °F (36.1 °C)   SpO2: 96%   Weight: 124 lb (56.2 kg)   Height: 5' 3\" (1.6 m)         General:   Alert, cooperative, no distress, appears stated age. Eyes:   Conjunctivae/corneas clear. PERRL        Mouth/Throat:  Lips, mucosa, and tongue normal. Teeth and gums normal.        Lungs:     Breath sounds clear bilaterally. Heart:   Regular rate and rhythm, S1, S2 normal, no murmur, click, rub or gallop. Abdomen:    Soft, non-tender. Extremities:  Extremities normal, atraumatic, no cyanosis or edema. Skin:  Skin color normal. No rashes or lesions     Neurologic:  A&Ox3     DIAGNOSTIC TESTS:                                                                                    LABS:   Lab Results   Component Value Date/Time    WBC 11.1 05/24/2022 03:36 AM    HGB 12.2 05/24/2022 03:36 AM    HCT 37.5 05/24/2022 03:36 AM     05/24/2022 03:36 AM    TSH 0.330 03/30/2021 04:23 AM    ESR 70 03/29/2021 01:47 PM    CRP 7.9 05/20/2022 09:18 PM     Imaging: I performed an independent interpretation of the patient's images. CXR:   XR CHEST STANDARD TWO VW 06/14/2022    Narrative  CHEST X-RAY, 2 views.     INDICATION:  Pneumonia, follow-up. TECHNIQUE: PA and lateral views. COMPARISON: May 2022. FINDINGS:  Lungs: Left lower lobe infiltrate has completely cleared. Lungs are  hyperinflated. Biapical pleural thickening. Costophrenic angles: are sharp. Heart size: is normal. Dual-lead left-sided cardiac pacemaker is present  Pulmonary vasculature: is unremarkable. Aorta: Arch calcifications. Included portion of the upper abdomen: is unremarkable. Bones: No gross bony lesions. Other: None. Impression  Complete resolution of the left lower lobe pneumonia. CT Chest:   CT CHEST W CONTRAST 03/29/2021    Narrative  CHEST CT    INDICATION: COVID-19, increasing shortness of breath    Multiple axial images were obtained through the chest during intravenous  infusion of 100mL of Isovue 370. Coronal reformats were also evaluated. Radiation dose reduction techniques were used for this study: All CT scans  performed at this facility use one or all of the following: Automated exposure  control, adjustment of the mA and/or kVp according to patient's size, iterative  reconstruction. COMPARISON: 05/17/2016    FINDINGS:  - LUNGS: There are bilateral pleural effusions, left greater than right. Associated atelectasis in both lung bases, also left greater than right.  - HEART/VESSELS: Normal enhancement of pulmonary arteries and aorta. There is a  pericardial effusion, 18 mm in greatest thickness.    - MEDIASTINUM/AXILLA: No significant adenopathy.  - CHEST WALL/BONES: Normal.  - UPPER ABDOMEN: Mild diffuse fatty infiltration of the liver. Several slightly  prominent vessels are noted near the left adrenal gland, likely a vascular  malformation or varices. Impression  1. Bilateral pleural effusions and bibasilar atelectasis, left greater than  right. 2.  Moderate size pericardial effusion. 3.  No evidence of pulmonary embolus.       ** If there are any questions about this report, I can be reached on  [employer] or at [phone] **    Nuclear Medicine: No results found for this or any previous visit from the past 3650 days. PFTs:   Office Spirometry Results Latest Ref Rng & Units 10/10/2022   FVC L 1.94   FEV1 L 1.59   FEV1 %PRED-PRE % 78   FVC %PRED-PRE % 71   FEV1/FVC % 82         PMH Reference Info:                                                                                                                  Past Medical History:   Diagnosis Date    Abnormal chest CT February, 2015    The patient had a CT scan that showed infiltrative as well as tree-in-bud changes felt to probably represent atypical mycobacteria infection. However, bronchoscopy with BAL was negative. Aspergillus flavus ([facility] [address] October, 2011    Grew out of BAL and felt to represent colonization    Bronchiectasis without acute exacerbation [facility]) January, 2016    Treated as an outpatient. Graves disease 2010    Thyroidectomy    Ineffective airway clearance     Bronchoscopy: 10/3/11, moderate, thick purulent mucus in the right main bronchus, preliminary positive for Aspergillus flavus.     LLL pneumonia 2010    treated as outpatient    Menopause         Tobacco Use      Smoking status: Never      Smokeless tobacco: Never    Allergies   Allergen Reactions    Duloxetine Other (See Comments)    Amoxicillin-Pot Clavulanate Diarrhea    Codeine      Other reaction(s): Agitation-Intolerance    Cucumber Extract      Other reaction(s): Abdominal pain-Intolerance  ASSOCIATED WITH GI UPSET    Sulfa Antibiotics Other (See Comments) and Hives    Sulfur Hives     Current Outpatient Medications   Medication Instructions    apixaban (ELIQUIS) 5 mg, Oral, 2 TIMES DAILY    fluticasone (FLONASE) 50 MCG/ACT nasal spray USE 2 SPRAYS IN EACH NOSTRIL EVERY DAY    fluticasone-salmeterol (ADVAIR) 250-50 MCG/DOSE AEPB 1 puff, Inhalation, 2 TIMES DAILY    guaiFENesin (MUCINEX) 600 mg, Oral, DAILY    levalbuterol (XOPENEX HFA) 45 MCG/ACT inhaler 2 puffs, Inhalation, EVERY 4 HOURS PRN levalbuterol (XOPENEX) 0.63 mg, Nebulization, 2 TIMES DAILY    levothyroxine (SYNTHROID) 88 mcg, Oral, DAILY, Most recent fill 2/21/22    melatonin 5 mg, Oral    metoprolol succinate (TOPROL XL) 50 mg, Oral, DAILY    nystatin (MYCOSTATIN) 448649 UNIT/GM powder Apply 3 times daily.     pantoprazole (PROTONIX) 40 mg, Oral    raloxifene (EVISTA) 60 mg, Oral, DAILY    venlafaxine (EFFEXOR) 75 mg, Oral, DAILY

## 2022-10-10 ENCOUNTER — OFFICE VISIT (OUTPATIENT)
Dept: PULMONOLOGY | Age: 77
End: 2022-10-10
Payer: MEDICARE

## 2022-10-10 VITALS
HEART RATE: 97 BPM | BODY MASS INDEX: 21.97 KG/M2 | HEIGHT: 63 IN | RESPIRATION RATE: 17 BRPM | DIASTOLIC BLOOD PRESSURE: 62 MMHG | OXYGEN SATURATION: 96 % | TEMPERATURE: 96.9 F | WEIGHT: 124 LBS | SYSTOLIC BLOOD PRESSURE: 118 MMHG

## 2022-10-10 DIAGNOSIS — J30.9 ALLERGIC RHINITIS, UNSPECIFIED SEASONALITY, UNSPECIFIED TRIGGER: ICD-10-CM

## 2022-10-10 DIAGNOSIS — R63.4 WEIGHT LOSS: ICD-10-CM

## 2022-10-10 DIAGNOSIS — J47.9 BRONCHIECTASIS WITHOUT COMPLICATION (HCC): Primary | ICD-10-CM

## 2022-10-10 LAB
EXPIRATORY TIME: NORMAL
FEF 25-75% %PRED-PRE: NORMAL
FEF 25-75% PRED: NORMAL
FEF 25-75%-PRE: NORMAL
FEV1 %PRED-PRE: 78 %
FEV1 PRED: 2.05 L
FEV1/FVC %PRED-PRE: NORMAL
FEV1/FVC PRED: NORMAL
FEV1/FVC: 82 %
FEV1: 1.59 L
FVC %PRED-PRE: 71 %
FVC PRED: 2.73 L
FVC: 1.94 L
PEF %PRED-PRE: NORMAL
PEF PRED: NORMAL
PEF-PRE: NORMAL

## 2022-10-10 PROCEDURE — 1036F TOBACCO NON-USER: CPT | Performed by: NURSE PRACTITIONER

## 2022-10-10 PROCEDURE — G8400 PT W/DXA NO RESULTS DOC: HCPCS | Performed by: NURSE PRACTITIONER

## 2022-10-10 PROCEDURE — 1123F ACP DISCUSS/DSCN MKR DOCD: CPT | Performed by: NURSE PRACTITIONER

## 2022-10-10 PROCEDURE — G8420 CALC BMI NORM PARAMETERS: HCPCS | Performed by: NURSE PRACTITIONER

## 2022-10-10 PROCEDURE — G8427 DOCREV CUR MEDS BY ELIG CLIN: HCPCS | Performed by: NURSE PRACTITIONER

## 2022-10-10 PROCEDURE — 3017F COLORECTAL CA SCREEN DOC REV: CPT | Performed by: NURSE PRACTITIONER

## 2022-10-10 PROCEDURE — 99214 OFFICE O/P EST MOD 30 MIN: CPT | Performed by: NURSE PRACTITIONER

## 2022-10-10 PROCEDURE — 1090F PRES/ABSN URINE INCON ASSESS: CPT | Performed by: NURSE PRACTITIONER

## 2022-10-10 PROCEDURE — G8484 FLU IMMUNIZE NO ADMIN: HCPCS | Performed by: NURSE PRACTITIONER

## 2022-10-10 RX ORDER — LEVALBUTEROL INHALATION SOLUTION 0.63 MG/3ML
0.63 SOLUTION RESPIRATORY (INHALATION) 2 TIMES DAILY
Qty: 120 EACH | Refills: 11 | Status: SHIPPED | OUTPATIENT
Start: 2022-10-10

## 2022-10-10 RX ORDER — LEVALBUTEROL TARTRATE 45 UG/1
2 AEROSOL, METERED ORAL EVERY 4 HOURS PRN
Qty: 1 EACH | Refills: 11 | Status: SHIPPED | OUTPATIENT
Start: 2022-10-10

## 2022-10-10 ASSESSMENT — PULMONARY FUNCTION TESTS
FEV1_PREDICTED: 2.05
FVC: 1.94
FVC_PREDICTED: 2.73
FEV1_PERCENT_PREDICTED_PRE: 78
FVC_PERCENT_PREDICTED_PRE: 71
FEV1/FVC: 82
FEV1: 1.59

## 2022-10-18 DIAGNOSIS — J47.9 BRONCHIECTASIS WITHOUT COMPLICATION (HCC): ICD-10-CM

## 2022-10-26 ENCOUNTER — TELEPHONE (OUTPATIENT)
Dept: PULMONOLOGY | Age: 77
End: 2022-10-26

## 2022-10-26 LAB
AEROBE ID RESULT 1: ABNORMAL
AEROBE ID RESULT 2: ABNORMAL
ANTIMICROBIAL SUSCEPTIBILITY: ABNORMAL
BACTERIA ISLT: ABNORMAL
BACTERIA SPEC CULT: ABNORMAL
GRAM STN SPEC: ABNORMAL
SERVICE CMNT-IMP: ABNORMAL
SPECIMEN SOURCE: ABNORMAL

## 2022-10-26 RX ORDER — LEVOFLOXACIN 750 MG/1
750 TABLET ORAL DAILY
Qty: 10 TABLET | Refills: 0 | Status: SHIPPED | OUTPATIENT
Start: 2022-10-26 | End: 2022-11-05

## 2022-10-26 NOTE — TELEPHONE ENCOUNTER
See result note--sputum positive for pseudomonas. I have escribed Levaquin for 10 days to her pharmacy.

## 2022-10-26 NOTE — TELEPHONE ENCOUNTER
Message  Received: Today  EM Degroot - PREETHI Love MA  Please let patient know that we need to start her on Levaquin 750 mg daily x 10 days for a bacteria that grew out in her sputum culture. Still needs to submit her second sputum culture for AFB (order is in computer). I have escribed the Levaquin to her local pharmacy.        Ist attempt to call pt to give her results LVM

## 2022-10-28 ENCOUNTER — TELEPHONE (OUTPATIENT)
Dept: PULMONOLOGY | Age: 77
End: 2022-10-28

## 2022-10-28 LAB
BACTERIA ISLT: ABNORMAL
ORGANISM ID: ABNORMAL
SPECIMEN SOURCE: ABNORMAL

## 2022-10-28 NOTE — TELEPHONE ENCOUNTER
----- Message from EM Fuentes CNP sent at 10/25/2022 12:23 PM EDT -----  Please let patient know that the bacterial culture revealed pseudomonas. I am waiting on final results to know which antibiotic she needs. Was supposed to have sputum for AFB as well, but results are not in computer--has she done the second sputum culture?

## 2022-10-28 NOTE — TELEPHONE ENCOUNTER
Spoke to the patient and gave her message, she said she received message and picked up medication, Levaquin, at the pharmacy, although giving her little upset stomach, she said will try taking it late in the day so she won't get symptoms but she feels better, also she said she already did second sputum. I informed her she will be called once results are in.  She voiced understanding Gorge Her, [address]

## 2022-11-10 ENCOUNTER — TELEPHONE (OUTPATIENT)
Dept: PULMONOLOGY | Age: 77
End: 2022-11-10

## 2022-11-10 DIAGNOSIS — J47.1 BRONCHIECTASIS WITH (ACUTE) EXACERBATION (HCC): Primary | ICD-10-CM

## 2022-11-10 RX ORDER — HYDROCODONE BITARTRATE AND HOMATROPINE METHYLBROMIDE ORAL SOLUTION 5; 1.5 MG/5ML; MG/5ML
5 LIQUID ORAL EVERY 6 HOURS PRN
Qty: 150 ML | Refills: 0 | Status: SHIPPED | OUTPATIENT
Start: 2022-11-10 | End: 2022-12-10

## 2022-11-10 NOTE — TELEPHONE ENCOUNTER
Reviewed directly with Dr. Ever Rodriguez, patient needs to have flu & COVID testing and ok for hycodan Rx.

## 2022-11-10 NOTE — TELEPHONE ENCOUNTER
Last seen: 10/10/22  Hx: bronchiectasis, allergic rhinitis, GERD,     Recent call noting sputum specimens & start of levaquin. Contacted patient regarding c/o sore throat & coughing, notes that her spouse had something earlier in the week, was not flu or COVID as he was tested; feels like she caught the same thing, wants to get ahead of this to avoid bronchitis or pneumonia, in the past steroids or antibiotics have been prescribed, understands if viral antibiotics would not be useful; really just wants some cough medication to help her sleep, has a small (1 or 2 doses) of hydrocodone-cloraph-something left & would like refill. Finished levquin Rx Saturday. According to  [facility] was prescribed hydrocdone-homatropine 150mL on 4/1/21. Please advise.

## 2022-11-10 NOTE — TELEPHONE ENCOUNTER
TRIAGE CALL      Complaint: Coughing/Sore throat  Cough: yes  Productive:  yes  Bloody Sputum:  no  Increased SOB/Wheezing:  no  Duration: 1 days  Fever/Chills: yes  OTC Meds tried: none

## 2022-11-21 ENCOUNTER — TELEPHONE (OUTPATIENT)
Dept: PULMONOLOGY | Age: 77
End: 2022-11-21

## 2022-11-21 NOTE — TELEPHONE ENCOUNTER
TRIAGE CALL      Complaint: Coughing/Congestion  Cough: yes  Productive:  yes  Bloody Sputum:  no  Increased SOB/Wheezing:  no  Duration: 2 weeks  Fever/Chills: no  OTC Meds tried: Tylenol Sinus

## 2022-11-21 NOTE — TELEPHONE ENCOUNTER
LOV 10/10/22 with Moy Dale NP--- bronchiectasis, allergic rhinitis    Allergies: Duloxetine, Augmentin, Codeine, Sulfa, Sulfur, cucumber    At LOV pt was seen for worsening cough-- fely and exam WNL. Advair increased to BID. Noted to monitor for possible MAC. Cultures show pseudomonas which she was started on Levaquin 750mg [id number]. On 11/10/22 pt called with cough/sore throat-- Dr Michaela La prescribed 150ml Hycodan (5ml q6). Pt c/o increased sputum production and malaise. No fevers. Requesting appt to speak with MD about her sx and dx. Scheduled for tomorrow.

## 2022-11-22 ENCOUNTER — OFFICE VISIT (OUTPATIENT)
Dept: PULMONOLOGY | Age: 77
End: 2022-11-22
Payer: MEDICARE

## 2022-11-22 VITALS
BODY MASS INDEX: 21.04 KG/M2 | RESPIRATION RATE: 18 BRPM | SYSTOLIC BLOOD PRESSURE: 122 MMHG | HEART RATE: 81 BPM | DIASTOLIC BLOOD PRESSURE: 68 MMHG | OXYGEN SATURATION: 98 % | HEIGHT: 66 IN | WEIGHT: 130.9 LBS | TEMPERATURE: 97.7 F

## 2022-11-22 DIAGNOSIS — J47.1 BRONCHIECTASIS WITH ACUTE EXACERBATION (HCC): Primary | ICD-10-CM

## 2022-11-22 DIAGNOSIS — J90 PLEURAL EFFUSION, BILATERAL: ICD-10-CM

## 2022-11-22 PROCEDURE — 1036F TOBACCO NON-USER: CPT | Performed by: INTERNAL MEDICINE

## 2022-11-22 PROCEDURE — G8400 PT W/DXA NO RESULTS DOC: HCPCS | Performed by: INTERNAL MEDICINE

## 2022-11-22 PROCEDURE — 3017F COLORECTAL CA SCREEN DOC REV: CPT | Performed by: INTERNAL MEDICINE

## 2022-11-22 PROCEDURE — G8484 FLU IMMUNIZE NO ADMIN: HCPCS | Performed by: INTERNAL MEDICINE

## 2022-11-22 PROCEDURE — 1090F PRES/ABSN URINE INCON ASSESS: CPT | Performed by: INTERNAL MEDICINE

## 2022-11-22 PROCEDURE — 99214 OFFICE O/P EST MOD 30 MIN: CPT | Performed by: INTERNAL MEDICINE

## 2022-11-22 PROCEDURE — G8420 CALC BMI NORM PARAMETERS: HCPCS | Performed by: INTERNAL MEDICINE

## 2022-11-22 PROCEDURE — G8427 DOCREV CUR MEDS BY ELIG CLIN: HCPCS | Performed by: INTERNAL MEDICINE

## 2022-11-22 PROCEDURE — 1123F ACP DISCUSS/DSCN MKR DOCD: CPT | Performed by: INTERNAL MEDICINE

## 2022-11-22 RX ORDER — LEVOFLOXACIN 750 MG/1
750 TABLET ORAL DAILY
Qty: 10 TABLET | Refills: 0 | Status: SHIPPED | OUTPATIENT
Start: 2022-11-22 | End: 2022-12-02

## 2022-11-22 RX ORDER — PREDNISONE 20 MG/1
20 TABLET ORAL DAILY
Qty: 10 TABLET | Refills: 0 | Status: SHIPPED | OUTPATIENT
Start: 2022-11-22 | End: 2022-12-02

## 2022-11-22 NOTE — PROGRESS NOTES
Patient Name:  Stanley Ivy                             YOB: 1945  MRN: [id number]                                              Office Visit 11/22/2022    ASSESSMENT AND PLAN:  (Medical Decision Making)    Reena Moore was seen today for cough. Diagnoses and all orders for this visit:    Bronchiectasis with acute exacerbation [facility]): This is most likely viral given her associated malaise, headache, but she is outside of 5 days window for considering Tamiflu or Paxil of it at this point and so viral testing I think would be of no specific benefit. 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 If she continues to have frequent exacerbations she will need an increase in her bronchiectasis regimen with perhaps a flutter valve and 3% saline, but sounds like she is having a fairly easy time coughing stuff up normally at this point.  -     levoFLOXacin (LEVAQUIN) 750 MG tablet; Take 1 tablet by mouth daily for 10 days  -     predniSONE (DELTASONE) 20 MG tablet; Take 1 tablet by mouth daily for 10 days    Pleural effusion, bilateral: Does not sound to have any fluid on her chest by exam and do not think that this is related anymore. She had resolution of prior effusions. Orders Placed This Encounter   Medications    levoFLOXacin (LEVAQUIN) 750 MG tablet     Sig: Take 1 tablet by mouth daily for 10 days     Dispense:  10 tablet     Refill:  0    predniSONE (DELTASONE) 20 MG tablet     Sig: Take 1 tablet by mouth daily for 10 days     Dispense:  10 tablet     Refill:  0       No orders of the defined types were placed in this encounter. Follow-up and Dispositions    Return for keep planned April appointment.        Blaze Talavera MD  _________________________________________________________________________    HISTORY OF PRESENT ILLNESS:    Ms. Cha Alberts 05/24/2022 03:36 AM    HGB 12.2 05/24/2022 03:36 AM    HCT 37.5 05/24/2022 03:36 AM     05/24/2022 03:36 AM    TSH 0.330 03/30/2021 04:23 AM    ESR 70 03/29/2021 01:47 PM    CRP 7.9 05/20/2022 09:18 PM     Imaging: I performed an independent interpretation of the patient's images. CXR:   XR CHEST STANDARD TWO VW 06/14/2022    Narrative  CHEST X-RAY, 2 views. INDICATION:  Pneumonia, follow-up. TECHNIQUE: PA and lateral views. COMPARISON: May 2022. FINDINGS:  Lungs: Left lower lobe infiltrate has completely cleared. Lungs are  hyperinflated. Biapical pleural thickening. Costophrenic angles: are sharp. Heart size: is normal. Dual-lead left-sided cardiac pacemaker is present  Pulmonary vasculature: is unremarkable. Aorta: Arch calcifications. Included portion of the upper abdomen: is unremarkable. Bones: No gross bony lesions. Other: None. Impression  Complete resolution of the left lower lobe pneumonia. CT Chest:     CT CHEST W CONTRAST 03/29/2021    Narrative  CHEST CT    INDICATION: COVID-19, increasing shortness of breath    Multiple axial images were obtained through the chest during intravenous  infusion of 100mL of Isovue 370. Coronal reformats were also evaluated. Radiation dose reduction techniques were used for this study: All CT scans  performed at this facility use one or all of the following: Automated exposure  control, adjustment of the mA and/or kVp according to patient's size, iterative  reconstruction. COMPARISON: 05/17/2016    FINDINGS:  - LUNGS: There are bilateral pleural effusions, left greater than right. Associated atelectasis in both lung bases, also left greater than right.  - HEART/VESSELS: Normal enhancement of pulmonary arteries and aorta. There is a  pericardial effusion, 18 mm in greatest thickness.    - MEDIASTINUM/AXILLA: No significant adenopathy.  - CHEST WALL/BONES: Normal.  - UPPER ABDOMEN: Mild diffuse fatty infiltration of the liver. Several slightly  prominent vessels are noted near the left adrenal gland, likely a vascular  malformation or varices. Impression  1. Bilateral pleural effusions and bibasilar atelectasis, left greater than  right. 2.  Moderate size pericardial effusion. 3.  No evidence of pulmonary embolus. ** If there are any questions about this report, I can be reached on  [employer] or at [phone] **    Nuclear Medicine: No results found for this or any previous visit from the past 3650 days. PFTs:   Office Spirometry Results Latest Ref Rng & Units 10/10/2022   FVC L 1.94   FEV1 L 1.59   FEV1 %PRED-PRE % 78   FVC %PRED-PRE % 71   FEV1/FVC % 82     No results found for this or any previous visit. No results found for this or any previous visit. FeNO: No results found for this or any previous visit. FeNO and Likelihood of Eosinophilic Asthma   Unlikely Intermediate Likely   <25 ppb 25-50 ppb >50ppb   Exercise Oximetry:  Echo: No results found for this or any previous visit from the past 3650 days. [facility] Reference Info:                                                                                                                Past Medical History:   Diagnosis Date    Abnormal chest CT February, 2015    The patient had a CT scan that showed infiltrative as well as tree-in-bud changes felt to probably represent atypical mycobacteria infection. However, bronchoscopy with BAL was negative. 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 Graves disease 2010    Thyroidectomy    Ineffective airway clearance     Bronchoscopy: 10/3/11, moderate, thick purulent mucus in the right main bronchus, preliminary positive for Aspergillus flavus.     LLL pneumonia 2010    treated as outpatient    Menopause         Tobacco Use      Smoking status: Never      Smokeless tobacco: Never    Allergies   Allergen Reactions    Duloxetine Other (See Comments)    Amoxicillin-Pot Clavulanate Diarrhea    Codeine      Other reaction(s): Agitation-Intolerance    Cucumber Extract      Other reaction(s): Abdominal pain-Intolerance  ASSOCIATED WITH GI UPSET    Sulfa Antibiotics Other (See Comments) and Hives    Sulfur Hives     Current Outpatient Medications   Medication Instructions    apixaban (ELIQUIS) 5 mg, Oral, 2 TIMES DAILY    fluticasone (FLONASE) 50 MCG/ACT nasal spray USE 2 SPRAYS IN EACH NOSTRIL EVERY DAY    fluticasone-salmeterol (ADVAIR) 250-50 MCG/DOSE AEPB 1 puff, Inhalation, 2 TIMES DAILY    guaiFENesin (MUCINEX) 600 mg, Oral, DAILY    HYDROcodone homatropine (HYCODAN) 5-1.5 MG/5ML solution 5 mLs, Oral, EVERY 6 HOURS PRN    levalbuterol (XOPENEX HFA) 45 MCG/ACT inhaler 2 puffs, Inhalation, EVERY 4 HOURS PRN    levalbuterol (XOPENEX) 0.63 mg, Nebulization, 2 TIMES DAILY    levoFLOXacin (LEVAQUIN) 750 mg, Oral, DAILY    levothyroxine (SYNTHROID) 88 mcg, Oral, DAILY, Most recent fill 2/21/22    melatonin 5 mg, Oral    metoprolol succinate (TOPROL XL) 50 mg, Oral, DAILY    predniSONE (DELTASONE) 20 mg, Oral, DAILY    raloxifene (EVISTA) 60 mg, Oral, DAILY    venlafaxine (EFFEXOR) 75 mg, Oral, DAILY

## 2022-12-01 NOTE — RESULT ENCOUNTER NOTE
I called and spoke with patient to let her know that sputum is negative for a typical organism (AFB). Patient understood and no other questions at this time.  Nataliya kathleen

## 2023-01-26 ENCOUNTER — TELEPHONE (OUTPATIENT)
Dept: PULMONOLOGY | Age: 78
End: 2023-01-26

## 2023-01-26 NOTE — TELEPHONE ENCOUNTER
TRIAGE CALL      Complaint: cough dry   Cough: yes  Productive:  no  Bloody Sputum:  no  Increased SOB/Wheezing:  no  Duration: 5 days   Fever/Chills: no  OTC Meds tried: musinex      She says it is a dry cough .

## 2023-01-27 RX ORDER — PREDNISONE 20 MG/1
20 TABLET ORAL DAILY
Qty: 7 TABLET | Refills: 0 | Status: SHIPPED | OUTPATIENT
Start: 2023-01-27 | End: 2023-02-03

## 2023-01-27 RX ORDER — SODIUM CHLORIDE FOR INHALATION 3 %
4 VIAL, NEBULIZER (ML) INHALATION PRN
Qty: 240 ML | Refills: 3 | Status: SHIPPED | OUTPATIENT
Start: 2023-01-27

## 2023-01-27 NOTE — TELEPHONE ENCOUNTER
Last seen: 11/22/22  Hx: bronchiectasis, h/o pseudomonas    Patient reporting about a week of increased coughing, earlier in the week had some sinus drainage, mid week had coughing episodes during the night waking her, has started to lose her voice; no fever; continues mucinex 600mg once daily, xopenex nebulizer once daily; asking if she could have something called in to prevent progression. Confirmed pharmacy on file.

## 2023-01-27 NOTE — TELEPHONE ENCOUNTER
MD Cuong Durand RN 4 hours ago (12:09 PM)     CS  If her cough is dry and not productive, I would avoid additional antibiotics. She had 2 courses of Levaquin in the past few months. I would add 3% saline to her nebs at least daily to twice daily and a flutter valve for airway clearance. She could get a prednisone 20mg x 7 days for now. Would need to have CXR, repeat sputum if productive and be seen if worsened despite this. Reviewed with pt. Verbalized understanding.

## 2023-02-21 DIAGNOSIS — J47.9 BRONCHIECTASIS WITHOUT COMPLICATION (HCC): ICD-10-CM

## 2023-02-22 RX ORDER — SODIUM CHLORIDE FOR INHALATION 3 %
4 VIAL, NEBULIZER (ML) INHALATION PRN
Qty: 240 ML | Refills: 3 | Status: SHIPPED | OUTPATIENT
Start: 2023-02-22

## 2023-02-22 RX ORDER — LEVALBUTEROL TARTRATE 45 UG/1
2 AEROSOL, METERED ORAL EVERY 4 HOURS PRN
Qty: 1 EACH | Refills: 11 | Status: SHIPPED | OUTPATIENT
Start: 2023-02-22

## 2023-07-10 ENCOUNTER — TRANSCRIBE ORDERS (OUTPATIENT)
Dept: SCHEDULING | Age: 78
End: 2023-07-10

## 2023-07-10 ENCOUNTER — TELEPHONE (OUTPATIENT)
Dept: PULMONOLOGY | Age: 78
End: 2023-07-10

## 2023-07-10 DIAGNOSIS — Z12.31 VISIT FOR SCREENING MAMMOGRAM: Primary | ICD-10-CM

## 2023-07-10 RX ORDER — PREDNISONE 20 MG/1
TABLET ORAL
Qty: 15 TABLET | Refills: 0 | Status: SHIPPED | OUTPATIENT
Start: 2023-07-10

## 2023-07-10 NOTE — TELEPHONE ENCOUNTER
LOV 4/11/2023 Mrs Silveira-bronchiectasis, allergic rhinitis, mucus plugging    Xopenex QID, flutter valve, Smart vest, Advair, saline neb prn, Flonase, was given LVQ at April appt for trip. I have spoken with patient. She reports not feeling bad. She reports coughing up brown with slight blood streak. No known fever. Some SOB. She reports coughing spells started 3 days ago. Minimal wheeze. No known COVID or flu exposure. Using Xopenex daily will increase to QID. Is not using flutter valve will increase to QID with Xopenex. She is using Advair daily will increase to BID and I have explained how this works in prevention of flare ups. She is taking Allegra and reports PND is not works. She is using her Smart Vest daily. Patient has not needed LVQ that was provided at April appointment. She is taking Mucinex 1200 mg daily. Spoke directly with Dr Myrtle Sanabria who orders for patient to start LVQ she has on hand and e-scribe Prednisone taper. Verbal with read back Prednisone to preferred pharmacy. Patient will call back if no improvement with above measures.

## 2023-07-10 NOTE — TELEPHONE ENCOUNTER
TRIAGE CALL      Complaint: cough  Cough: yes  Productive:  yes  Bloody Sputum:  yes  Increased SOB/Wheezing:  yes  Duration: 3 days  Fever/Chills: no  OTC Meds tried: no     Taking normal maintenance medications. Has saved some sputum in specimen cup she received from our office previously. Pharmacy is [address]    Pt can be reached at [phone]

## 2023-08-03 ENCOUNTER — HOSPITAL ENCOUNTER (OUTPATIENT)
Dept: MAMMOGRAPHY | Age: 78
Discharge: HOME | End: 2023-08-03
Payer: MEDICARE

## 2023-08-03 DIAGNOSIS — Z12.31 VISIT FOR SCREENING MAMMOGRAM: ICD-10-CM

## 2023-08-03 PROCEDURE — 77063 BREAST TOMOSYNTHESIS BI: CPT

## 2023-10-09 NOTE — PROGRESS NOTES
Name:  Alf Riley  YOB: 1945   MRN: [id number]      Office Visit: 10/10/2023        ASSESSMENT AND PLAN:  (Medical Decision Making)    Impression: 78 y.o. female with bronchiectasis and history of MAC.    1. Bronchiectasis without complication ([address])  --has had one exacerbation since last visit requiring steroids and antibiotics, but it back to baseline currently. Continue Vest treatments daily and bronchodilators. - levalbuterol (XOPENEX) 0.63 MG/3ML nebulization; Take 3 mLs by nebulization 2 times daily  Dispense: 120 each; Refill: 11    2. Allergic rhinitis, unspecified seasonality, unspecified trigger  --controlled with Flonase. Continue current therapy. 3. Mucus plugging of bronchi  --uses saline in nebulizer prn. Orders Placed This Encounter   Medications    levalbuterol (XOPENEX) 0.63 MG/3ML nebulization     Sig: Take 3 mLs by nebulization 2 times daily     Dispense:  120 each     Refill:  11    DISCONTD: respiratory syncytial vaccine, adjuvanted (AREXVY) 120 MCG/0.5ML injection     Sig: Inject 0.5 mLs into the muscle once for 1 dose     Dispense:  0.5 mL     Refill:  0     No orders of the defined types were placed in this encounter. Follow-up and Dispositions    Return in about 6 months (around 4/10/2024) for Walter Rutledge or MD, Bronchiectasis. Collaborating physician is Dr. Angella Mack. ADS    Werner Moe, EM - PREETHI Melton/[place]=pulmonaryy team    _________________________________________________________________________    HISTORY OF PRESENT ILLNESS:    Ms. Pat Bennett is a 78 y.o. female who is seen at [facility] today for  Other (Bronchiectasis )     The patient is a 78year old female who is seen for follow up of bronchiectasis. She has a history of atrial fib (on Eliquis), hypothyroidism, and GERD. 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      Went to [place] over the summer and thankfully did not have any pulmonary issues.   She used her flutter

## 2023-10-10 ENCOUNTER — OFFICE VISIT (OUTPATIENT)
Dept: PULMONOLOGY | Age: 78
End: 2023-10-10
Payer: MEDICARE

## 2023-10-10 VITALS
RESPIRATION RATE: 17 BRPM | OXYGEN SATURATION: 99 % | BODY MASS INDEX: 23.46 KG/M2 | HEIGHT: 63 IN | WEIGHT: 132.4 LBS | SYSTOLIC BLOOD PRESSURE: 104 MMHG | HEART RATE: 71 BPM | DIASTOLIC BLOOD PRESSURE: 66 MMHG | TEMPERATURE: 97.2 F

## 2023-10-10 DIAGNOSIS — J30.9 ALLERGIC RHINITIS, UNSPECIFIED SEASONALITY, UNSPECIFIED TRIGGER: ICD-10-CM

## 2023-10-10 DIAGNOSIS — J47.9 BRONCHIECTASIS WITHOUT COMPLICATION (HCC): Primary | ICD-10-CM

## 2023-10-10 DIAGNOSIS — T17.500A MUCUS PLUGGING OF BRONCHI: ICD-10-CM

## 2023-10-10 PROCEDURE — G8427 DOCREV CUR MEDS BY ELIG CLIN: HCPCS | Performed by: NURSE PRACTITIONER

## 2023-10-10 PROCEDURE — G8484 FLU IMMUNIZE NO ADMIN: HCPCS | Performed by: NURSE PRACTITIONER

## 2023-10-10 PROCEDURE — G8420 CALC BMI NORM PARAMETERS: HCPCS | Performed by: NURSE PRACTITIONER

## 2023-10-10 PROCEDURE — 1123F ACP DISCUSS/DSCN MKR DOCD: CPT | Performed by: NURSE PRACTITIONER

## 2023-10-10 PROCEDURE — G8400 PT W/DXA NO RESULTS DOC: HCPCS | Performed by: NURSE PRACTITIONER

## 2023-10-10 PROCEDURE — 3017F COLORECTAL CA SCREEN DOC REV: CPT | Performed by: NURSE PRACTITIONER

## 2023-10-10 PROCEDURE — 1036F TOBACCO NON-USER: CPT | Performed by: NURSE PRACTITIONER

## 2023-10-10 PROCEDURE — 99214 OFFICE O/P EST MOD 30 MIN: CPT | Performed by: NURSE PRACTITIONER

## 2023-10-10 PROCEDURE — 1090F PRES/ABSN URINE INCON ASSESS: CPT | Performed by: NURSE PRACTITIONER

## 2023-10-10 RX ORDER — LEVALBUTEROL INHALATION SOLUTION 0.63 MG/3ML
0.63 SOLUTION RESPIRATORY (INHALATION) 2 TIMES DAILY
Qty: 120 EACH | Refills: 11 | Status: SHIPPED | OUTPATIENT
Start: 2023-10-10

## 2024-04-16 NOTE — PROGRESS NOTES
Name:  Reema Bull  YOB: 1945   MRN: [id number]      Office Visit: 2024        ASSESSMENT AND PLAN:  (Medical Decision Making)    Impression: 78 y.o. female with underlying bronchiectasis.    1. Bronchiectasis without complication (HCC)  --She is doing well on her current therapy.  She is to continue her Advair twice daily and Xopenex as needed.  She is using percussion vest and nebulized Xopenex once daily.  We discussed what to do if the power went out or if she was traveling and could not take her percussion vest with her or her nebulizer-she would resume her flutter valve and her albuterol inhaler.  - fluticasone-salmeterol (ADVAIR DISKUS) 250-50 MCG/ACT AEPB diskus inhaler; Inhale 1 puff into the lungs in the morning and 1 puff in the evening.  Dispense: 1 each; Refill: 11  - levalbuterol (XOPENEX HFA) 45 MCG/ACT inhaler; Inhale 2 puffs into the lungs every 4 hours as needed for Wheezing or Shortness of Breath  Dispense: 1 each; Refill: 11  - levalbuterol (XOPENEX) 0.63 MG/3ML nebulization; Take 3 mLs by nebulization 2 times daily  Dispense: 120 each; Refill: 11    2. Allergic rhinitis, unspecified seasonality, unspecified trigger  --This is controlled with her Flonase.  - fluticasone (FLONASE) 50 MCG/ACT nasal spray; 2 sprays by Nasal route daily  Dispense: 1 each; Refill: 11    3. Long term (current) use of anticoagulants  --Remains on Eliquis for atrial fib.    Orders Placed This Encounter   Medications    fluticasone (FLONASE) 50 MCG/ACT nasal spray     Si sprays by Nasal route daily     Dispense:  1 each     Refill:  11    fluticasone-salmeterol (ADVAIR DISKUS) 250-50 MCG/ACT AEPB diskus inhaler     Sig: Inhale 1 puff into the lungs in the morning and 1 puff in the evening.     Dispense:  1 each     Refill:  11    levalbuterol (XOPENEX HFA) 45 MCG/ACT inhaler     Sig: Inhale 2 puffs into the lungs every 4 hours as needed for Wheezing or Shortness of Breath     Dispense:

## 2024-04-17 ENCOUNTER — OFFICE VISIT (OUTPATIENT)
Dept: PULMONOLOGY | Age: 79
End: 2024-04-17

## 2024-04-17 VITALS
WEIGHT: 124.6 LBS | RESPIRATION RATE: 17 BRPM | SYSTOLIC BLOOD PRESSURE: 130 MMHG | HEART RATE: 72 BPM | OXYGEN SATURATION: 98 % | HEIGHT: 63 IN | BODY MASS INDEX: 22.08 KG/M2 | DIASTOLIC BLOOD PRESSURE: 70 MMHG | TEMPERATURE: 97.6 F

## 2024-04-17 DIAGNOSIS — J47.9 BRONCHIECTASIS WITHOUT COMPLICATION (HCC): Primary | ICD-10-CM

## 2024-04-17 DIAGNOSIS — Z79.01 LONG TERM (CURRENT) USE OF ANTICOAGULANTS: ICD-10-CM

## 2024-04-17 DIAGNOSIS — J30.9 ALLERGIC RHINITIS, UNSPECIFIED SEASONALITY, UNSPECIFIED TRIGGER: ICD-10-CM

## 2024-04-17 RX ORDER — LEVALBUTEROL INHALATION SOLUTION 0.63 MG/3ML
0.63 SOLUTION RESPIRATORY (INHALATION) 2 TIMES DAILY
Qty: 120 EACH | Refills: 11 | Status: SHIPPED | OUTPATIENT
Start: 2024-04-17

## 2024-04-17 RX ORDER — FLUTICASONE PROPIONATE 50 MCG
2 SPRAY, SUSPENSION (ML) NASAL DAILY
Qty: 1 EACH | Refills: 11 | Status: SHIPPED | OUTPATIENT
Start: 2024-04-17

## 2024-04-17 RX ORDER — SODIUM CHLORIDE FOR INHALATION 3 %
4 VIAL, NEBULIZER (ML) INHALATION PRN
Qty: 240 ML | Refills: 5 | Status: SHIPPED | OUTPATIENT
Start: 2024-04-17

## 2024-04-17 RX ORDER — LEVALBUTEROL TARTRATE 45 UG/1
2 AEROSOL, METERED ORAL EVERY 4 HOURS PRN
Qty: 1 EACH | Refills: 11 | Status: SHIPPED | OUTPATIENT
Start: 2024-04-17

## 2024-04-17 RX ORDER — FLUTICASONE PROPIONATE AND SALMETEROL 250; 50 UG/1; UG/1
1 POWDER RESPIRATORY (INHALATION) EVERY 12 HOURS
Qty: 1 EACH | Refills: 11 | Status: SHIPPED | OUTPATIENT
Start: 2024-04-17

## 2024-05-06 RX ORDER — SODIUM CHLORIDE FOR INHALATION 3 %
VIAL, NEBULIZER (ML) INHALATION
Qty: 240 ML | Refills: 5 | OUTPATIENT
Start: 2024-05-06

## 2024-05-08 RX ORDER — SODIUM CHLORIDE FOR INHALATION 3 %
VIAL, NEBULIZER (ML) INHALATION
Qty: 240 ML | Refills: 5 | Status: SHIPPED | OUTPATIENT
Start: 2024-05-08

## 2024-07-09 ENCOUNTER — TRANSCRIBE ORDERS (OUTPATIENT)
Dept: SCHEDULING | Age: 79
End: 2024-07-09

## 2024-07-09 DIAGNOSIS — Z12.31 SCREENING MAMMOGRAM FOR HIGH-RISK PATIENT: Primary | ICD-10-CM

## 2024-08-22 ENCOUNTER — HOSPITAL ENCOUNTER (OUTPATIENT)
Dept: MAMMOGRAPHY | Age: 79
Discharge: HOME | End: 2024-08-22
Payer: MEDICARE

## 2024-08-22 VITALS — BODY MASS INDEX: 23.04 KG/M2 | HEIGHT: 63 IN | WEIGHT: 130 LBS

## 2024-08-22 DIAGNOSIS — Z12.31 SCREENING MAMMOGRAM FOR HIGH-RISK PATIENT: ICD-10-CM

## 2024-08-22 PROCEDURE — 77063 BREAST TOMOSYNTHESIS BI: CPT

## 2024-10-17 NOTE — PROGRESS NOTES
[id number].      This study has been designated as a OP MD Notification Report.  Communication of the findings is pending and will be documented via an addendum when complete.    Signed by: 10/15/2024 9:29 AM: Isaac TOWNSEND, [facility] Medicine: No results found for this or any previous visit from the past 3650 days.    PFTs:       Latest Ref Rng & Units 10/21/2024     9:45 AM 10/10/2022    11:35 AM   Office Spirometry Results   FVC L 1.79  1.94    FEV1 L 1.47  1.59    FEV1 %Pred-Pre % 75  78    FVC %Pred-Pre % 71  71    FEV1/FVC % 82  82            No results found for this or any previous visit. No results found for this or any previous visit.    FeNO: No results found for this or any previous visit.  FeNO and Likelihood of Eosinophilic Asthma   Unlikely Intermediate Likely   <25 ppb 25-50 ppb >50ppb     Exercise Oximetry:    Echo: No results found for this or any previous visit from the past 3650 days.    [facility] Reference Info:                                                                                                                Immunization History   Administered Date(s) Administered    COVID-19, MODERNA Bivalent, (age 12y+), IM, 50 mcg/0.5 mL 04/25/2023    COVID-19, NOVAVAX, (age 12y+), IM, 5mcg/0.5mL 04/02/2024    COVID-19, PFIZER Bivalent, DO NOT Dilute, (age 12y+), IM, 30 mcg/0.3 mL 09/14/2022    COVID-19, PFIZER PURPLE top, DILUTE for use, (age 12 y+), 30mcg/0.3mL 01/20/2021, 02/06/2021, 09/27/2021, 04/04/2022    COVID-19, PFIZER, (age 12y+), IM, 30mcg/0.3mL 09/17/2023    Influenza A ([id number]) Vaccine IM 10/11/2017    Influenza Trivalent 09/18/2013, 10/18/2015, 10/01/2016, 10/01/2018    Influenza Virus Vaccine 09/18/2013, 10/01/2014, 09/18/2016, 10/15/2018, 10/15/2019, 08/26/2020, 10/01/2021    Influenza, FLUAD, (age 65 y+), IM, Trivalent PF, 0.5mL 10/15/2019    Influenza, FLUZONE High Dose (age 65 y+), IM, Quadv, 0.7mL 08/26/2020, 10/01/2022, 09/01/2023    Influenza, FLUZONE High Dose, (age 65 y+), IM,

## 2024-10-21 ENCOUNTER — OFFICE VISIT (OUTPATIENT)
Dept: PULMONOLOGY | Age: 79
End: 2024-10-21
Payer: MEDICARE

## 2024-10-21 VITALS
TEMPERATURE: 97.2 F | HEART RATE: 68 BPM | DIASTOLIC BLOOD PRESSURE: 68 MMHG | RESPIRATION RATE: 17 BRPM | HEIGHT: 63 IN | BODY MASS INDEX: 22.36 KG/M2 | SYSTOLIC BLOOD PRESSURE: 116 MMHG | WEIGHT: 126.2 LBS | OXYGEN SATURATION: 100 %

## 2024-10-21 DIAGNOSIS — R91.1 LUNG NODULE: ICD-10-CM

## 2024-10-21 DIAGNOSIS — J30.9 ALLERGIC RHINITIS, UNSPECIFIED SEASONALITY, UNSPECIFIED TRIGGER: ICD-10-CM

## 2024-10-21 DIAGNOSIS — J47.9 BRONCHIECTASIS WITHOUT COMPLICATION (HCC): Primary | ICD-10-CM

## 2024-10-21 PROBLEM — J18.9 PARAPNEUMONIC EFFUSION: Status: RESOLVED | Noted: 2021-03-29 | Resolved: 2024-10-21

## 2024-10-21 PROBLEM — D72.829 LEUKOCYTOSIS: Status: RESOLVED | Noted: 2021-03-31 | Resolved: 2024-10-21

## 2024-10-21 PROBLEM — J90 PLEURAL EFFUSION, BILATERAL: Status: RESOLVED | Noted: 2021-03-31 | Resolved: 2024-10-21

## 2024-10-21 PROBLEM — J91.8 PARAPNEUMONIC EFFUSION: Status: RESOLVED | Noted: 2021-03-29 | Resolved: 2024-10-21

## 2024-10-21 PROBLEM — A41.9 SEPSIS (HCC): Status: RESOLVED | Noted: 2022-05-20 | Resolved: 2024-10-21

## 2024-10-21 PROBLEM — J18.9 COMMUNITY ACQUIRED PNEUMONIA: Status: RESOLVED | Noted: 2021-03-29 | Resolved: 2024-10-21

## 2024-10-21 PROBLEM — U07.1 LAB TEST POSITIVE FOR DETECTION OF COVID-19 VIRUS: Status: RESOLVED | Noted: 2022-05-20 | Resolved: 2024-10-21

## 2024-10-21 LAB
EXPIRATORY TIME: NORMAL
FEF 25-75% %PRED-PRE: NORMAL
FEF 25-75% PRED: NORMAL
FEF 25-75-PRE: NORMAL
FEV1 %PRED-PRE: 75 %
FEV1 PRED: 1.94 L
FEV1/FVC %PRED-PRE: NORMAL
FEV1/FVC PRED: NORMAL
FEV1/FVC: 82 %
FEV1: 1.47 L
FVC %PRED-PRE: 71 %
FVC PRED: 2.54 L
FVC: 1.79 L
PEF %PRED-PRE: NORMAL
PEF PRED: NORMAL
PEF-PRE: NORMAL

## 2024-10-21 PROCEDURE — G8400 PT W/DXA NO RESULTS DOC: HCPCS | Performed by: NURSE PRACTITIONER

## 2024-10-21 PROCEDURE — 99214 OFFICE O/P EST MOD 30 MIN: CPT | Performed by: NURSE PRACTITIONER

## 2024-10-21 PROCEDURE — G8484 FLU IMMUNIZE NO ADMIN: HCPCS | Performed by: NURSE PRACTITIONER

## 2024-10-21 PROCEDURE — G8427 DOCREV CUR MEDS BY ELIG CLIN: HCPCS | Performed by: NURSE PRACTITIONER

## 2024-10-21 PROCEDURE — 94010 BREATHING CAPACITY TEST: CPT | Performed by: INTERNAL MEDICINE

## 2024-10-21 PROCEDURE — 1036F TOBACCO NON-USER: CPT | Performed by: NURSE PRACTITIONER

## 2024-10-21 PROCEDURE — 1090F PRES/ABSN URINE INCON ASSESS: CPT | Performed by: NURSE PRACTITIONER

## 2024-10-21 PROCEDURE — 1123F ACP DISCUSS/DSCN MKR DOCD: CPT | Performed by: NURSE PRACTITIONER

## 2024-10-21 PROCEDURE — G8420 CALC BMI NORM PARAMETERS: HCPCS | Performed by: NURSE PRACTITIONER

## 2024-10-21 RX ORDER — DICYCLOMINE HYDROCHLORIDE 10 MG/1
CAPSULE ORAL
COMMUNITY
Start: 2024-08-22

## 2024-10-21 RX ORDER — LEVALBUTEROL TARTRATE 45 UG/1
2 AEROSOL, METERED ORAL EVERY 4 HOURS PRN
Qty: 1 EACH | Refills: 11 | Status: SHIPPED | OUTPATIENT
Start: 2024-10-21

## 2024-10-21 RX ORDER — FLUTICASONE PROPIONATE AND SALMETEROL 250; 50 UG/1; UG/1
1 POWDER RESPIRATORY (INHALATION) EVERY 12 HOURS
Qty: 1 EACH | Refills: 11 | Status: SHIPPED | OUTPATIENT
Start: 2024-10-21

## 2024-10-21 RX ORDER — SODIUM CHLORIDE FOR INHALATION 3 %
VIAL, NEBULIZER (ML) INHALATION DAILY
Qty: 240 ML | Refills: 5 | Status: SHIPPED | OUTPATIENT
Start: 2024-10-21

## 2024-10-21 RX ORDER — FLUTICASONE PROPIONATE 50 MCG
2 SPRAY, SUSPENSION (ML) NASAL DAILY
Qty: 1 EACH | Refills: 11 | Status: SHIPPED | OUTPATIENT
Start: 2024-10-21

## 2024-10-21 ASSESSMENT — PULMONARY FUNCTION TESTS
FEV1: 1.47
FEV1_PREDICTED: 1.94
FEV1_PERCENT_PREDICTED_PRE: 75
FVC_PERCENT_PREDICTED_PRE: 71
FEV1/FVC: 82
FVC: 1.79
FVC_PREDICTED: 2.54

## 2024-10-21 NOTE — PATIENT INSTRUCTIONS
I have ordered CT of chest due in March, 2025.  You should receive a call from scheduling 30 days prior to due date.  If you do not hear from them, please call [phone] to schedule your test.

## 2024-11-26 ENCOUNTER — TELEPHONE (OUTPATIENT)
Dept: PULMONOLOGY | Age: 79
End: 2024-11-26

## 2024-11-26 NOTE — TELEPHONE ENCOUNTER
TRIAGE CALL      Complaint: Bronchitis  Cough: yes  Productive:  yes/ light brown  Bloody Sputum:  no  Increased SOB/Wheezing:  yes both   Duration: a week   Fever/Chills: not since Saturday night   OTC Meds tried: musinex    Went to Urgent care for sore throat last Wednesday . Went back Sunday

## 2024-11-27 NOTE — TELEPHONE ENCOUNTER
Last seen: 10/21/24  Hx; bronchiectasis, nodule, allergic rhinitis    Patient reporting bronchitis & treated by urgent care twice in the last week, left msg to call back with how we can support care from urgent care.

## 2025-03-26 ENCOUNTER — TELEPHONE (OUTPATIENT)
Dept: PULMONOLOGY | Age: 80
End: 2025-03-26

## 2025-03-26 NOTE — TELEPHONE ENCOUNTER
Called patient and left VM letting her know we have to reschedule the 4/23 appointment with Liliya as she will be out of the office. Left new appointment information in VM and sending letter/mychart message

## 2025-04-21 ENCOUNTER — HOSPITAL ENCOUNTER (OUTPATIENT)
Dept: CT IMAGING | Age: 80
Discharge: HOME | End: 2025-04-23
Payer: MEDICARE

## 2025-04-21 DIAGNOSIS — R91.1 LUNG NODULE: ICD-10-CM

## 2025-04-21 PROCEDURE — 71250 CT THORAX DX C-: CPT

## 2025-04-22 ENCOUNTER — RESULTS FOLLOW-UP (OUTPATIENT)
Age: 80
End: 2025-04-22

## 2025-04-22 ENCOUNTER — OFFICE VISIT (OUTPATIENT)
Age: 80
End: 2025-04-22
Payer: MEDICARE

## 2025-04-22 VITALS
BODY MASS INDEX: 22.04 KG/M2 | WEIGHT: 124.4 LBS | SYSTOLIC BLOOD PRESSURE: 126 MMHG | DIASTOLIC BLOOD PRESSURE: 74 MMHG | TEMPERATURE: 97.8 F | HEART RATE: 81 BPM | OXYGEN SATURATION: 97 % | HEIGHT: 63 IN | RESPIRATION RATE: 17 BRPM

## 2025-04-22 DIAGNOSIS — R91.1 LUNG NODULE: Primary | ICD-10-CM

## 2025-04-22 DIAGNOSIS — J30.9 ALLERGIC RHINITIS, UNSPECIFIED SEASONALITY, UNSPECIFIED TRIGGER: ICD-10-CM

## 2025-04-22 DIAGNOSIS — K21.9 GASTROESOPHAGEAL REFLUX DISEASE WITHOUT ESOPHAGITIS: ICD-10-CM

## 2025-04-22 DIAGNOSIS — Z86.19 HISTORY OF MAC INFECTION: ICD-10-CM

## 2025-04-22 DIAGNOSIS — J47.9 BRONCHIECTASIS WITHOUT COMPLICATION (HCC): Chronic | ICD-10-CM

## 2025-04-22 PROCEDURE — 1123F ACP DISCUSS/DSCN MKR DOCD: CPT | Performed by: NURSE PRACTITIONER

## 2025-04-22 PROCEDURE — 1126F AMNT PAIN NOTED NONE PRSNT: CPT | Performed by: NURSE PRACTITIONER

## 2025-04-22 PROCEDURE — G8400 PT W/DXA NO RESULTS DOC: HCPCS | Performed by: NURSE PRACTITIONER

## 2025-04-22 PROCEDURE — 1036F TOBACCO NON-USER: CPT | Performed by: NURSE PRACTITIONER

## 2025-04-22 PROCEDURE — G8427 DOCREV CUR MEDS BY ELIG CLIN: HCPCS | Performed by: NURSE PRACTITIONER

## 2025-04-22 PROCEDURE — 1090F PRES/ABSN URINE INCON ASSESS: CPT | Performed by: NURSE PRACTITIONER

## 2025-04-22 PROCEDURE — 1159F MED LIST DOCD IN RCRD: CPT | Performed by: NURSE PRACTITIONER

## 2025-04-22 PROCEDURE — G8420 CALC BMI NORM PARAMETERS: HCPCS | Performed by: NURSE PRACTITIONER

## 2025-04-22 PROCEDURE — G2211 COMPLEX E/M VISIT ADD ON: HCPCS | Performed by: NURSE PRACTITIONER

## 2025-04-22 PROCEDURE — 99214 OFFICE O/P EST MOD 30 MIN: CPT | Performed by: NURSE PRACTITIONER

## 2025-04-22 RX ORDER — CIPROFLOXACIN 500 MG/1
500 TABLET, FILM COATED ORAL 2 TIMES DAILY
Qty: 14 TABLET | Refills: 0 | Status: SHIPPED | OUTPATIENT
Start: 2025-04-22 | End: 2025-04-29

## 2025-04-22 RX ORDER — FLUTICASONE PROPIONATE AND SALMETEROL 250; 50 UG/1; UG/1
1 POWDER RESPIRATORY (INHALATION) EVERY 12 HOURS
Qty: 1 EACH | Refills: 11 | Status: SHIPPED | OUTPATIENT
Start: 2025-04-22

## 2025-04-22 RX ORDER — SODIUM CHLORIDE FOR INHALATION 3 %
VIAL, NEBULIZER (ML) INHALATION DAILY
Qty: 240 ML | Refills: 5 | Status: SHIPPED | OUTPATIENT
Start: 2025-04-22

## 2025-04-22 RX ORDER — LEVALBUTEROL TARTRATE 45 UG/1
2 AEROSOL, METERED ORAL EVERY 4 HOURS PRN
Qty: 1 EACH | Refills: 11 | Status: SHIPPED | OUTPATIENT
Start: 2025-04-22

## 2025-04-22 RX ORDER — FLUTICASONE PROPIONATE 50 MCG
2 SPRAY, SUSPENSION (ML) NASAL DAILY
Qty: 1 EACH | Refills: 11 | Status: SHIPPED | OUTPATIENT
Start: 2025-04-22

## 2025-04-22 NOTE — RESULT ENCOUNTER NOTE
Please let patient know that radiologist agrees that nodule is stable.  He believes there is some mild pneumonia in the LLL as well.  Will place on antibiotics--Cipro 500 mg bid x 7 days.  I sent this to her pharmacy.  Needs follow up CT of chest in 6 months.

## 2025-04-22 NOTE — PROGRESS NOTES
Name:  Reema Bull  YOB: 1945   MRN: [id number]      Office Visit: 4/22/2025      The patient (or guardian, if applicable) and other individuals in attendance with the patient were advised that Artificial Intelligence will be utilized during this visit to record, process the conversation to generate a clinical note, and support improvement of the AI technology. The patient (or guardian, if applicable) and other individuals in attendance at the appointment consented to the use of AI, including the recording.         Assessment & Plan (Medical Decision Making)      Impression: 79 y.o. female     Assessment & Plan  1. Left lower lobe lung nodule.  The 5 mm left lower lobe nodule appears stable based on the comparison of previous scans. Awaiting the radiologist's final interpretation of the recent CT scan. If the radiologist confirms stability, a follow-up CT scan will be scheduled in 6 to 12 months.  - AFB Culture + Smear W/Rflx ID From Culture; Future  - AFB Culture + Smear W/Rflx ID From Culture; Future    2. Bronchiectasis.  This is a chronic condition requiring regular lung clearance. Currently using a percussion vest, nebulized albuterol or Xopenex, and nebulized saline. As long as she is doing well, doing that 1-2 times a day is sufficient. If symptoms worsen, such as increased cough or changes in mucus color or thickness, the frequency of these treatments should be increased to 3-4 times daily.  - fluticasone-salmeterol (ADVAIR DISKUS) 250-50 MCG/ACT AEPB diskus inhaler; Inhale 1 puff into the lungs in the morning and 1 puff in the evening.  Dispense: 1 each; Refill: 11  - levalbuterol (XOPENEX HFA) 45 MCG/ACT inhaler; Inhale 2 puffs into the lungs every 4 hours as needed for Wheezing or Shortness of Breath  Dispense: 1 each; Refill: 11  - sodium chloride, Inhalant, 3 % nebulizer solution; Take by nebulization daily  Dispense: 240 mL; Refill: 5  - AFB Culture + Smear W/Rflx ID From

## 2025-04-22 NOTE — TELEPHONE ENCOUNTER
----- Message from EM Oliva CNP sent at 4/22/2025  2:02 PM EDT -----  Please let patient know that radiologist agrees that nodule is stable.  He believes there is some mild pneumonia in the LLL as well.  Will place on antibiotics--Cipro 500 mg bid x 7 days.  I sent this to her pharmacy.  Needs follow up CT of chest in 6 months.

## 2025-04-29 ENCOUNTER — TELEPHONE (OUTPATIENT)
Dept: PULMONOLOGY | Age: 80
End: 2025-04-29

## 2025-04-29 DIAGNOSIS — J47.9 BRONCHIECTASIS WITHOUT COMPLICATION (HCC): Chronic | ICD-10-CM

## 2025-04-29 DIAGNOSIS — Z86.19 HISTORY OF MAC INFECTION: ICD-10-CM

## 2025-04-29 DIAGNOSIS — R91.1 LUNG NODULE: ICD-10-CM

## 2025-04-30 LAB
ACID FAST STN SPEC: NEGATIVE
SPECIMEN PREPARATION: NORMAL
SPECIMEN SOURCE: NORMAL

## 2025-05-01 ENCOUNTER — RESULTS FOLLOW-UP (OUTPATIENT)
Age: 80
End: 2025-05-01

## 2025-05-01 LAB
ACID FAST STN SPEC: NEGATIVE
SPECIMEN PREPARATION: NORMAL
SPECIMEN SOURCE: NORMAL

## 2025-05-01 NOTE — RESULT ENCOUNTER NOTE
Please let patient know that I received preliminary results on one of her sputum cultures and so far it is negative.  Will take about 6-8 weeks to get the final results.

## 2025-05-01 NOTE — TELEPHONE ENCOUNTER
----- Message from EM Oliva CNP sent at 5/1/2025  9:05 AM EDT -----  Please let patient know that I received preliminary results on one of her sputum cultures and so far it is negative.  Will take about 6-8 weeks to get the final results.

## 2025-05-27 ENCOUNTER — TELEPHONE (OUTPATIENT)
Dept: PULMONOLOGY | Age: 80
End: 2025-05-27

## 2025-05-27 NOTE — TELEPHONE ENCOUNTER
Patient call and states she is having trouble with her precussion vest. Making a noise and not working correctly I told her to call smart vest [phone]. They should be able to help her. Nataliya kathleen

## 2025-07-29 ENCOUNTER — TRANSCRIBE ORDERS (OUTPATIENT)
Dept: SCHEDULING | Age: 80
End: 2025-07-29

## 2025-07-29 DIAGNOSIS — Z12.31 ENCOUNTER FOR SCREENING MAMMOGRAM FOR MALIGNANT NEOPLASM OF BREAST: Primary | ICD-10-CM

## 2025-08-08 ENCOUNTER — TELEPHONE (OUTPATIENT)
Dept: PULMONOLOGY | Age: 80
End: 2025-08-08

## 2025-08-08 DIAGNOSIS — J47.9 BRONCHIECTASIS WITHOUT COMPLICATION (HCC): Primary | ICD-10-CM

## 2025-09-02 ENCOUNTER — HOSPITAL ENCOUNTER (OUTPATIENT)
Dept: MAMMOGRAPHY | Age: 80
Discharge: HOME | End: 2025-09-05
Payer: MEDICARE

## 2025-09-02 DIAGNOSIS — Z12.31 ENCOUNTER FOR SCREENING MAMMOGRAM FOR MALIGNANT NEOPLASM OF BREAST: ICD-10-CM

## 2025-09-02 PROCEDURE — 77067 SCR MAMMO BI INCL CAD: CPT

## 2025-09-02 PROCEDURE — 77063 BREAST TOMOSYNTHESIS BI: CPT

## (undated) DEVICE — GEL MEDC ULTRASOUND 5L -- REPLACED BY 326862

## (undated) DEVICE — STERILE POLYISOPRENE POWDER-FREE SURGICAL GLOVES: Brand: PROTEXIS

## (undated) DEVICE — KIT THORCENT 8FR L5IN POLYUR W/ 18/22/25GA NDL 3 W STPCOCK